# Patient Record
Sex: FEMALE | Race: WHITE | NOT HISPANIC OR LATINO | Employment: UNEMPLOYED | URBAN - METROPOLITAN AREA
[De-identification: names, ages, dates, MRNs, and addresses within clinical notes are randomized per-mention and may not be internally consistent; named-entity substitution may affect disease eponyms.]

---

## 2017-02-06 ENCOUNTER — PROCEDURE VISIT (OUTPATIENT)
Dept: OPHTHALMOLOGY | Facility: CLINIC | Age: 77
End: 2017-02-06
Payer: MEDICARE

## 2017-02-06 VITALS — HEART RATE: 67 BPM | DIASTOLIC BLOOD PRESSURE: 62 MMHG | SYSTOLIC BLOOD PRESSURE: 164 MMHG

## 2017-02-06 DIAGNOSIS — H35.3110 NONEXUDATIVE AGE-RELATED MACULAR DEGENERATION OF RIGHT EYE: ICD-10-CM

## 2017-02-06 DIAGNOSIS — H35.3211 EXUDATIVE AGE-RELATED MACULAR DEGENERATION OF RIGHT EYE WITH ACTIVE CHOROIDAL NEOVASCULARIZATION: Primary | ICD-10-CM

## 2017-02-06 DIAGNOSIS — H43.393 FLOATER, VITREOUS, BILATERAL: ICD-10-CM

## 2017-02-06 DIAGNOSIS — H35.3220 EXUDATIVE AGE-RELATED MACULAR DEGENERATION OF LEFT EYE: ICD-10-CM

## 2017-02-06 PROCEDURE — 92134 CPTRZ OPH DX IMG PST SGM RTA: CPT | Mod: PBBFAC,PO | Performed by: OPHTHALMOLOGY

## 2017-02-06 PROCEDURE — 92014 COMPRE OPH EXAM EST PT 1/>: CPT | Mod: 25,S$PBB,, | Performed by: OPHTHALMOLOGY

## 2017-02-06 PROCEDURE — 67028 INJECTION EYE DRUG: CPT | Mod: S$PBB,RT,, | Performed by: OPHTHALMOLOGY

## 2017-02-06 PROCEDURE — 67028 INJECTION EYE DRUG: CPT | Mod: PBBFAC,PO,RT | Performed by: OPHTHALMOLOGY

## 2017-02-06 RX ADMIN — AFLIBERCEPT 2 MG: 40 INJECTION, SOLUTION INTRAVITREAL at 11:02

## 2017-02-06 NOTE — PROGRESS NOTES
Pt Hard of healing    OCT - OD  PED's with SRF  OS - SR Fibrosis - NO SRF      A/P    1. Wet AMD OS  S/p Avastin injections outside  Stable today with cicatrix  Observe    2. Wet AMD OD  S/p Avastin OD x 7  S/p Eylea OD x 3    Eylea OD    Alternate tx      AREDS/AG    3. DM  No significant DR  BS/BP/chol control    4. PCIOL OU      6-7 weeks OCT      Risks, benefits, and alternatives to treatment discussed in detail with the patient.  The patient voiced understanding and wished to proceed with the procedure    Injection Procedure Note:  Diagnosis: Wet AMD OD    Topical Proparacaine and Betadine.  Inject Eylea OD at 6:00 @ 3.5-4mm posterior to limbus  Post Operative Dx: Same  Complications: None  Follow up as above.

## 2017-02-06 NOTE — PATIENT INSTRUCTIONS

## 2017-02-06 NOTE — MR AVS SNAPSHOT
[facility]  [address]  [facility] [address]  Phone: [phone]  Fax: [phone]                  Rebeca Turner   2017 9:40 AM   Procedure visit    Description:  Female : 1940   Provider:  SONU Sue MD   Department:  [facility]           Reason for Visit     Eye Problem           Diagnoses this Visit        Comments    Exudative age-related macular degeneration of right eye with active choroidal neovascularization    -  Primary     Nonexudative age-related macular degeneration of right eye         Floater, vitreous, bilateral         Exudative age-related macular degeneration of left eye                To Do List           Future Appointments        Provider Department Dept Phone    3/15/2017 10:40 AM Sae Snowden DPM [facility] Pod[facility]ry [phone]    2017 9:20 AM Edward Lema Jr., MD [facility] Neuorology [phone]    2017 10:00 AM LAB, [place] [facility] [phone]    2017 11:20 AM Andrea Ellis NP [facility] Hematology [phone]      Goals (5 Years of Data)              Today    16    Blood Pressure < 140/90   164/62  159/60  138/56    BMI is less than 25           HEMOGLOBIN A1C < 7.0             Follow-Up and Disposition     Return in about 6 weeks (around 3/20/2017).      [facility]     [facility] Care [facility] - 24/7 Assistance  Registered nurses in the [facility] provide clinical advisement, health education, appointment booking, and other advisory services.  Call for this free service at [phone].             Medications           These medications were administered today        Dose Freq    aflibercept Soln 2 mg 2 mg Clinic/HOD 1 time    Si.05 mLs (2 mg total) by Intravitreal route one time.    Class: Normal    Route: Intravitreal           Verify that the below list of medications is an accurate representation of the medications  you are currently taking.  If none reported, the list may be blank. If incorrect, please contact your healthcare provider. Carry this list with you in case of emergency.           Current Medications     acetaminophen (TYLENOL EXTRA STRENGTH) 500 MG tablet Take 500 mg by mouth every 6 (six) hours as needed for Pain or Temperature greater than.     alendronate (FOSAMAX) 70 MG tablet TAKE ONE TABLET WEEKLY AS DIRECTED ON THURSDAY    calcium carbonate-vitamin D3 (CALCIUM 600 + D,3,) 600 mg calcium- 200 unit Cap Take 1 capsule by mouth 2 (two) times daily.    CRESTOR 10 mg tablet TAKE ONE TABLET BY MOUTH EVERY NIGHT AT BEDTIME    diltiaZEM (CARDIZEM CD) 360 MG 24 hr capsule     dimethicone-kelvin-A-C-E-aloe (GOLD BOND ULTIMATE DIABETICS') 3-30 % Crea Apply topically 2 (two) times daily. APPLY TO FEET    docusate sodium (COLACE) 100 MG capsule Take 100 mg by mouth 2 (two) times daily.    hydrALAZINE (APRESOLINE) 50 MG tablet Take 50 mg by mouth every 12 (twelve) hours.    IRON,CARBONYL/ASCORBIC ACID (ICAR-C ORAL) Take 1 tablet by mouth once daily.    labetalol (NORMODYNE) 300 MG tablet TAKE ONE TABLET BY MOUTH TWICE DAILY FOR BLOOD PRESSURE    LANTUS SOLOSTAR 100 unit/mL (3 mL) InPn pen     lisinopril (PRINIVIL,ZESTRIL) 20 MG tablet     lutein 20 mg Cap TAKE ONE SOFTGEL BY MOUTH EVERY MORNING    polyethylene glycol (GLYCOLAX) 17 gram/dose powder     TEGRETOL  mg 12 hr tablet TAKE ONE TABLET BY MOUTH ONCE DAILY ***SEIZURES***    triamterene-hydrochlorothiazide 37.5-25 mg (MAXZIDE-25) 37.5-25 mg per tablet Take 1 tablet by mouth once daily.           Clinical Reference Information           Your Vitals Were     BP Pulse                164/62 (BP Location: Left arm, Patient Position: Sitting, BP Method: Automatic) 67          Blood Pressure          Most Recent Value    BP  (!)  164/62      Allergies as of 2/6/2017     No Known Allergies      Immunizations Administered on Date of Encounter - 2/6/2017     None       Orders Placed During Today's Visit      Normal Orders This Visit    Posterior Segment OCT Retina-Both eyes     Prior Authorization Order     Future Labs/Procedures Expected by Expires    Posterior Segment OCT Retina-Both eyes  As directed 2/6/2018      Language Assistance Services     ATTENTION: Language assistance services are available, free of charge. Please call [phone].      ATENCIÓN: Si habla adan, tiene a noel disposición servicios gratuitos de asistencia lingüística. Llame al [phone].     CHÚ Ý: N?u b?n nói Ti?ng Vi?t, có các d?ch v? h? tr? ngôn ng? mi?n phí dành cho b?n. G?i s? [phone].         [facility] complies with applicable Federal civil rights laws and does not discriminate on the basis of race, color, national origin, age, disability, or sex.

## 2017-03-15 ENCOUNTER — OFFICE VISIT (OUTPATIENT)
Dept: PODIATRY | Facility: CLINIC | Age: 77
End: 2017-03-15
Payer: MEDICARE

## 2017-03-15 VITALS — BODY MASS INDEX: 36.17 KG/M2 | WEIGHT: 204.13 LBS | HEIGHT: 63 IN

## 2017-03-15 DIAGNOSIS — L98.8 MACERATION OF SKIN: ICD-10-CM

## 2017-03-15 DIAGNOSIS — L84 CORN OR CALLUS: ICD-10-CM

## 2017-03-15 DIAGNOSIS — B35.1 ONYCHOMYCOSIS DUE TO DERMATOPHYTE: ICD-10-CM

## 2017-03-15 DIAGNOSIS — E11.49 TYPE II DIABETES MELLITUS WITH NEUROLOGICAL MANIFESTATIONS: Primary | ICD-10-CM

## 2017-03-15 PROCEDURE — 11057 PARNG/CUTG B9 HYPRKR LES >4: CPT | Mod: PBBFAC,PO | Performed by: PODIATRIST

## 2017-03-15 PROCEDURE — 11721 DEBRIDE NAIL 6 OR MORE: CPT | Mod: PBBFAC,PO | Performed by: PODIATRIST

## 2017-03-15 PROCEDURE — 99214 OFFICE O/P EST MOD 30 MIN: CPT | Mod: 25,S$PBB,, | Performed by: PODIATRIST

## 2017-03-15 PROCEDURE — 11057 PARNG/CUTG B9 HYPRKR LES >4: CPT | Mod: S$PBB,Q9,, | Performed by: PODIATRIST

## 2017-03-15 PROCEDURE — 99999 PR PBB SHADOW E&M-EST. PATIENT-LVL II: CPT | Mod: PBBFAC,,, | Performed by: PODIATRIST

## 2017-03-15 PROCEDURE — 11721 DEBRIDE NAIL 6 OR MORE: CPT | Mod: S$PBB,59,Q9, | Performed by: PODIATRIST

## 2017-03-15 PROCEDURE — 99212 OFFICE O/P EST SF 10 MIN: CPT | Mod: PBBFAC,PO | Performed by: PODIATRIST

## 2017-03-15 NOTE — PROGRESS NOTES
Review of Systems   Constitution: Negative for chills and fever.   Cardiovascular: Negative for claudication and leg swelling.   Skin: Positive for color change and nail changes.   Musculoskeletal: Negative for arthritis, joint pain and joint swelling.   Gastrointestinal: Negative for nausea and vomiting.   Neurological: Positive for paresthesias.   Psychiatric/Behavioral: Negative for altered mental status.       Physical Exam   Constitutional: She is oriented to person, place, and time. She appears well-developed and well-nourished. No distress.   Cardiovascular:   Pulses:       Dorsalis pedis pulses are 2+ on the right side, and 2+ on the left side.        Posterior tibial pulses are 1+ on the right side, and 1+ on the left side.   CFT <3 seconds bilateral.  Pedal hair growth decreased bilateral.  Varicosities noted to bilateral lower extremity.  Mild nonpitting edema noted to bilateral lower extremity.  Toes are cool to touch bilateral.     Musculoskeletal: She exhibits edema. She exhibits no tenderness.   Muscle strength 5/5 in all muscle groups bilateral.  No tenderness nor crepitation with ROM of foot/ankle joints bilateral.  No tenderness with palpation of bilateral foot and ankle.  Bilateral pes planus foot type.     Neurological: She is alert and oriented to person, place, and time. She has normal strength. A sensory deficit is present.   Protective sensation per Merritt-Julianne monofilament decreased bilateral.    Vibratory sensation decreased bilateral.    Light touch intact bilateral.   Skin: Skin is warm and dry. Lesion noted. No abrasion, no bruising, no burn, no ecchymosis, no laceration, no petechiae and no rash noted. She is not diaphoretic. No cyanosis or erythema. No pallor. Nails show no clubbing.   Pedal skin appears thin and atrophic bilateral.  Toenails x 10 appear thickened by 2 mm's, elongated by 4 mm's, and discolored with subungual debris.  Hyperkeratotic lesions noted to the Rt. 2nd  toe, bilateral sub 2nd metatarsal head, and bilateral medial hallux.  Multiple nevi noted to the dorsum of the Lt. Foot.  Interdigital maceration noted to all webspaces.  No break in the adjacent skin integrity.                      Subjective:      Patient ID: Rebeca Turner is a 76 y.o. female.    Chief Complaint: Callouses; Nail Care; and Diabetes Mellitus (Alexander 7/8/16 A1C 6.9 11/2/16)    Rebeca is a 76 y.o. female who presents to the clinic for routine evaluation and treatment of diabetic feet. Rebeca has a past medical history of Anemia of chronic disease; CKD (chronic kidney disease) stage 3, GFR 30-59 ml/min; DM type 2 (diabetes mellitus, type 2); DVT (deep venous thrombosis); Dysplastic nevi; Fatty liver; GERD (gastroesophageal reflux disease); H/O esophagogastroduodenoscopy; History of endometrial cancer; HTN (hypertension); Hyperlipidemia; LVE (left ventricular enlargement); Macular degeneration; MR (mental retardation); Obesity; LUISANA (obstructive sleep apnea); Osteoporosis; S/P colonoscopy; SBO (small bowel obstruction); Seizure disorder; and Vitreous detachment. Patient relates no major problem with feet. Only complaints today consist of toenails in need of trimming.  Denies being painful with wearing shoe gear.  Has not attempted to trim on her own.  Denies any additional pedal complaints.      PCP: Bárbara Munoz MD    Date Last Seen by PCP: 7/8/16    Current shoe gear: Casual shoes    Hemoglobin A1C   Date Value Ref Range Status   11/02/2016 6.9 (H) 4.5 - 6.2 % Final     Comment:     According to ADA guidelines, hemoglobin A1C <7.0% represents  optimal control in non-pregnant diabetic patients.  Different  metrics may apply to specific populations.   Standards of Medical Care in Diabetes - 2016.  For the purpose of screening for the presence of diabetes:  <5.7%     Consistent with the absence of diabetes  5.7-6.4%  Consistent with increasing risk for diabetes   (prediabetes)  >or=6.5%  Consistent with  diabetes  Currently no consensus exists for use of hemoglobin A1C  for diagnosis of diabetes for children.     08/23/2016 6.2 4.5 - 6.2 % Final     Comment:     According to ADA guidelines, hemoglobin A1C <7.0% represents  optimal control in non-pregnant diabetic patients.  Different  metrics may apply to specific populations.   Standards of Medical Care in Diabetes - 2016.  For the purpose of screening for the presence of diabetes:  <5.7%     Consistent with the absence of diabetes  5.7-6.4%  Consistent with increasing risk for diabetes   (prediabetes)  >or=6.5%  Consistent with diabetes  Currently no consensus exists for use of hemoglobin A1C  for diagnosis of diabetes for children.     05/31/2016 6.6 (H) 0.0 - 5.6 % Final     Comment:     Reference Interval:  5.0 - 5.6 Normal   5.7 - 6.4 High Risk   > 6.5 Diabetic    Hgb A1c results are standardized based on the (NGSP) National   Glycohemoglobin Standardization Program.    Hemoglobin A1C levels are related to mean serum/plasma glucose   during the preceding 2-3 months.                Past Medical History:   Diagnosis Date    Anemia of chronic disease     hematology su neg    CKD (chronic kidney disease) stage 3, GFR 30-59 ml/min     DM type 2 (diabetes mellitus, type 2)     DVT (deep venous thrombosis)     x 2 both post-op with last 6/16    Dysplastic nevi     h/o    Fatty liver     noted on usg 9/15    GERD (gastroesophageal reflux disease)     H/O esophagogastroduodenoscopy     normal 11/10    History of endometrial cancer     stage 1;  s/p SENDY with BSO 10/06    HTN (hypertension)     Hyperlipidemia     LVE (left ventricular enlargement)     noted on 6/15 ECHO    Macular degeneration     exudative    MR (mental retardation)     Obesity     LUISANA (obstructive sleep apnea)     su in progress    Osteoporosis     osteopenia noted on 2/13 and 7/15 dexa    S/P colonoscopy     last 10/15;  next 10/19    SBO (small bowel obstruction)     h/o recurrent  SBO;  s/p R hemicolectomy 12/10 and s/p incarcerated hernia repair with extensive scar tissue 6/16    Seizure disorder     Vitreous detachment        Past Surgical History:   Procedure Laterality Date    ABDOMINAL SURGERY  06/2016    Incarcerated incisional hernia, enterotomy x3, and extensive adhesions      CATARACT EXTRACTION      B 6/11    CHOLECYSTECTOMY      COLON SURGERY  12/6/2010  (Dinorah, Ms.)    R hemicolectomy 12/10 due to recurrent SBO from benign colon mass 12/2010.   Benign albeit large polyp.    COLONOSCOPY N/A 10/7/2015    Procedure: COLONOSCOPY;  Surgeon: Will Cr Jr., MD;  Location: [facility] ENDO;  Service: Endoscopy;  Laterality: N/A;    COLONOSCOPY W/ POLYPECTOMY  11/22/2010  Dinorah, Ms.    3 cm growth mid ascending colon.  TUBULOVILLOUS ADENOMA. 9 mm polyp mid transverse colon.  TUBULAR ADENOMA.  10 mm polyp distal descending colon.  TUBULOVILLOUS ADENOMA.     ESOPHAGOGASTRODUODENOSCOPY  11/16/2010    Normal EGD.    HERNIA REPAIR  8/2015    from incarcerated hernia    HYSTERECTOMY  10/17/2006    SENDY and BSO 10/06 for stage 1 endometrial cancer    MYRINGOTOMY W/ TUBES      bilateral    TONSILLECTOMY         Family History   Problem Relation Age of Onset    Cancer Sister      details unknown    Heart attack Father     Pulmonary embolism Mother        Social History     Social History    Marital status: Single     Spouse name: N/A    Number of children: N/A    Years of education: N/A     Social History Main Topics    Smoking status: Never Smoker    Smokeless tobacco: Never Used    Alcohol use No    Drug use: No    Sexual activity: No     Other Topics Concern    None     Social History Narrative    Lives at [facility].       Current Outpatient Prescriptions   Medication Sig Dispense Refill    acetaminophen (TYLENOL EXTRA STRENGTH) 500 MG tablet Take 500 mg by mouth every 6 (six) hours as needed for Pain or Temperature greater than.       alendronate (FOSAMAX) 70 MG tablet  TAKE ONE TABLET WEEKLY AS DIRECTED ON THURSDAY 4 tablet 0    calcium carbonate-vitamin D3 (CALCIUM 600 + D,3,) 600 mg calcium- 200 unit Cap Take 1 capsule by mouth 2 (two) times daily.      CRESTOR 10 mg tablet TAKE ONE TABLET BY MOUTH EVERY NIGHT AT BEDTIME 30 tablet 0    diltiaZEM (CARDIZEM CD) 360 MG 24 hr capsule       dimethicone-kelvin-A-C-E-aloe (GOLD BOND ULTIMATE DIABETICS') 3-30 % Crea Apply topically 2 (two) times daily. APPLY TO FEET      docusate sodium (COLACE) 100 MG capsule Take 100 mg by mouth 2 (two) times daily.      hydrALAZINE (APRESOLINE) 50 MG tablet Take 50 mg by mouth every 12 (twelve) hours.      IRON,CARBONYL/ASCORBIC ACID (ICAR-C ORAL) Take 1 tablet by mouth once daily.      labetalol (NORMODYNE) 300 MG tablet TAKE ONE TABLET BY MOUTH TWICE DAILY FOR BLOOD PRESSURE 60 tablet 0    LANTUS SOLOSTAR 100 unit/mL (3 mL) InPn pen       lisinopril (PRINIVIL,ZESTRIL) 20 MG tablet       lutein 20 mg Cap TAKE ONE SOFTGEL BY MOUTH EVERY MORNING 30 each 0    polyethylene glycol (GLYCOLAX) 17 gram/dose powder       rivaroxaban (XARELTO) 20 mg Tab Take 1 tablet (20 mg total) by mouth daily with dinner or evening meal. Continue for Minimum  Of Six months. 30 tablet 5    TEGRETOL  mg 12 hr tablet TAKE ONE TABLET BY MOUTH ONCE DAILY SEIZURES 30 tablet 0    triamterene-hydrochlorothiazide 37.5-25 mg (MAXZIDE-25) 37.5-25 mg per tablet Take 1 tablet by mouth once daily.       No current facility-administered medications for this visit.        Review of patient's allergies indicates:  No Known Allergies      Review of Systems   Constitution: Negative for chills and fever.   Cardiovascular: Negative for claudication and leg swelling.   Skin: Positive for color change and nail changes.   Musculoskeletal: Negative for arthritis, joint pain and joint swelling.   Gastrointestinal: Negative for nausea and vomiting.   Neurological: Positive for paresthesias.   Psychiatric/Behavioral: Negative for  altered mental status.           Objective:      Physical Exam   Constitutional: She is oriented to person, place, and time. She appears well-developed and well-nourished. No distress.   Cardiovascular:   Pulses:       Dorsalis pedis pulses are 2+ on the right side, and 2+ on the left side.        Posterior tibial pulses are 1+ on the right side, and 1+ on the left side.   CFT <3 seconds bilateral.  Pedal hair growth decreased bilateral.  Varicosities noted to bilateral lower extremity.  Mild nonpitting edema noted to bilateral lower extremity.  Toes are cool to touch bilateral.     Musculoskeletal: She exhibits edema. She exhibits no tenderness.   Muscle strength 5/5 in all muscle groups bilateral.  No tenderness nor crepitation with ROM of foot/ankle joints bilateral.  No tenderness with palpation of bilateral foot and ankle.  Bilateral pes planus foot type.     Neurological: She is alert and oriented to person, place, and time. She has normal strength. A sensory deficit is present.   Protective sensation per Whipple-Julianne monofilament decreased bilateral.    Vibratory sensation decreased bilateral.    Light touch intact bilateral.   Skin: Skin is warm and dry. Lesion noted. No abrasion, no bruising, no burn, no ecchymosis, no laceration, no petechiae and no rash noted. She is not diaphoretic. No cyanosis or erythema. No pallor. Nails show no clubbing.   Pedal skin appears thin and atrophic bilateral.  Toenails x 10 appear thickened by 2 mm's, elongated by 4 mm's, and discolored with subungual debris.  Hyperkeratotic lesions noted to the Rt. 2nd toe, bilateral sub 2nd metatarsal head, and bilateral medial hallux.  Multiple nevi noted to the dorsum of the Lt. Foot.               Assessment:       Encounter Diagnoses   Name Primary?    Type II diabetes mellitus with neurological manifestations Yes    Corn or callus     Onychomycosis due to dermatophyte     Maceration of skin          Plan:       Rebeca was seen  today for callouses, nail care and diabetes mellitus.    Diagnoses and all orders for this visit:    Type II diabetes mellitus with neurological manifestations    Corn or callus    Onychomycosis due to dermatophyte    Maceration of skin      I counseled the patient on her conditions, their implications and medical management.    Shoe inspection. Diabetic Foot Education. Patient reminded of the importance of good nutrition and blood sugar control to help prevent podiatric complications of diabetes. Patient instructed on proper foot hygeine. We discussed wearing proper shoe gear, daily foot inspections, never walking without protective shoe gear, never putting sharp instruments to feet    Advised to apply betadine to all interdigital spaces to resolve maceration.     With patient's permission, nails were aggressively reduced and debrided x 10 to their soft tissue attachment mechanically and with electric , removing all offending nail and debris.  Also, a sterile #15 scalpel was used to trim lesions x 5 down to healthy appearing skin without incident.  Patient relates relief following the procedure. She will continue to monitor the areas daily, inspect her feet, wear protective shoe gear when ambulatory, moisturizer to maintain skin integrity and follow in this office in approximately 2-3 months, sooner p.r.n.      Return in about 3 months (around 6/15/2017).    aSe [facility] DPM

## 2017-03-15 NOTE — MR AVS SNAPSHOT
[facility]  [address] [place] [address]  Phone: [phone]                  Rebeca Turner   3/15/2017 10:40 AM   Office Visit    Description:  Female : 1940   Provider:  Sae Snowden DPM   Department:  [facility]           Reason for Visit     Callouses     Nail Care     Diabetes Mellitus           Diagnoses this Visit        Comments    Type II diabetes mellitus with neurological manifestations    -  Primary     Corn or callus         Onychomycosis due to dermatophyte         Maceration of skin                To Do List           Future Appointments        Provider Department Dept Phone    3/20/2017 9:50 AM SONU Sue MD [place] [facility] [phone]    2017 9:20 AM Edward Lema Jr., MD [facility] Neurology [phone]    2017 10:00 AM LAB, [place] [facility] [phone]    2017 11:20 AM Andrea Ellis NP [facility] Hematology [phone]    6/15/2017 9:20 AM [facility] [phone]      Goals (5 Years of Data)              16    Blood Pressure < 140/90   164/62  164/62  164/62    BMI is less than 25           HEMOGLOBIN A1C < 7.0             Follow-Up and Disposition     Return in about 3 months (around 6/15/2017).      [facility]     [facility] Care [facility] - 24/7 Assistance  Registered nurses in the [facility] provide clinical advisement, health education, appointment booking, and other advisory services.  Call for this free service at [phone].             Medications                Verify that the below list of medications is an accurate representation of the medications you are currently taking.  If none reported, the list may be blank. If incorrect, please contact your healthcare provider. Carry this list with you in case of emergency.           Current Medications     acetaminophen (TYLENOL EXTRA STRENGTH)  "500 MG tablet Take 500 mg by mouth every 6 (six) hours as needed for Pain or Temperature greater than.     alendronate (FOSAMAX) 70 MG tablet TAKE ONE TABLET WEEKLY AS DIRECTED ON THURSDAY    calcium carbonate-vitamin D3 (CALCIUM 600 + D,3,) 600 mg calcium- 200 unit Cap Take 1 capsule by mouth 2 (two) times daily.    CRESTOR 10 mg tablet TAKE ONE TABLET BY MOUTH EVERY NIGHT AT BEDTIME    diltiaZEM (CARDIZEM CD) 360 MG 24 hr capsule     dimethicone-kelvin-A-C-E-aloe (GOLD BOND ULTIMATE DIABETICS') 3-30 % Crea Apply topically 2 (two) times daily. APPLY TO FEET    docusate sodium (COLACE) 100 MG capsule Take 100 mg by mouth 2 (two) times daily.    hydrALAZINE (APRESOLINE) 50 MG tablet Take 50 mg by mouth every 12 (twelve) hours.    IRON,CARBONYL/ASCORBIC ACID (ICAR-C ORAL) Take 1 tablet by mouth once daily.    labetalol (NORMODYNE) 300 MG tablet TAKE ONE TABLET BY MOUTH TWICE DAILY FOR BLOOD PRESSURE    LANTUS SOLOSTAR 100 unit/mL (3 mL) InPn pen     lisinopril (PRINIVIL,ZESTRIL) 20 MG tablet     lutein 20 mg Cap TAKE ONE SOFTGEL BY MOUTH EVERY MORNING    polyethylene glycol (GLYCOLAX) 17 gram/dose powder     TEGRETOL  mg 12 hr tablet TAKE ONE TABLET BY MOUTH ONCE DAILY ***SEIZURES***    triamterene-hydrochlorothiazide 37.5-25 mg (MAXZIDE-25) 37.5-25 mg per tablet Take 1 tablet by mouth once daily.           Clinical Reference Information           Your Vitals Were     Height Weight BMI          5' 3" (1.6 m) 92.6 kg (204 lb 2.3 oz) 36.16 kg/m2        Allergies as of 3/15/2017     No Known Allergies      Immunizations Administered on Date of Encounter - 3/15/2017     None      Language Assistance Services     ATTENTION: Language assistance services are available, free of charge. Please call [phone].      ATENCIÓN: Si eduarda arellano, tiene a noel disposición servicios gratuitos de asistencia lingüística. Llame al [phone].     CHÚ Ý: N?u b?n nói Ti?ng Vi?t, có các d?ch v? h? tr? ngôn ng? mi?n navneet uribe " b?n. G?i s? [phone].         [facility] complies with applicable Federal civil rights laws and does not discriminate on the basis of race, color, national origin, age, disability, or sex.

## 2017-03-16 ENCOUNTER — TELEPHONE (OUTPATIENT)
Dept: PODIATRY | Facility: CLINIC | Age: 77
End: 2017-03-16

## 2017-03-20 ENCOUNTER — PROCEDURE VISIT (OUTPATIENT)
Dept: OPHTHALMOLOGY | Facility: CLINIC | Age: 77
End: 2017-03-20
Payer: MEDICARE

## 2017-03-20 VITALS — SYSTOLIC BLOOD PRESSURE: 166 MMHG | DIASTOLIC BLOOD PRESSURE: 63 MMHG | HEART RATE: 66 BPM

## 2017-03-20 DIAGNOSIS — H35.3110 NONEXUDATIVE AGE-RELATED MACULAR DEGENERATION OF RIGHT EYE: ICD-10-CM

## 2017-03-20 DIAGNOSIS — H35.3211 EXUDATIVE AGE-RELATED MACULAR DEGENERATION OF RIGHT EYE WITH ACTIVE CHOROIDAL NEOVASCULARIZATION: Primary | ICD-10-CM

## 2017-03-20 PROCEDURE — C9257 BEVACIZUMAB INJECTION: HCPCS | Mod: PBBFAC,PO | Performed by: OPHTHALMOLOGY

## 2017-03-20 PROCEDURE — 92014 COMPRE OPH EXAM EST PT 1/>: CPT | Mod: 25,S$PBB,, | Performed by: OPHTHALMOLOGY

## 2017-03-20 PROCEDURE — 67028 INJECTION EYE DRUG: CPT | Mod: S$PBB,RT,, | Performed by: OPHTHALMOLOGY

## 2017-03-20 PROCEDURE — 67028 INJECTION EYE DRUG: CPT | Mod: PBBFAC,PO,RT | Performed by: OPHTHALMOLOGY

## 2017-03-20 PROCEDURE — 92134 CPTRZ OPH DX IMG PST SGM RTA: CPT | Mod: PBBFAC,PO | Performed by: OPHTHALMOLOGY

## 2017-03-20 RX ADMIN — BEVACIZUMAB 1.25 MG: 100 INJECTION, SOLUTION INTRAVENOUS at 10:03

## 2017-03-20 NOTE — PATIENT INSTRUCTIONS

## 2017-03-20 NOTE — MR AVS SNAPSHOT
[place] [facility]  [address]  [facility] [address]  Phone: [phone]  Fax: [phone]                  Rebeca Turner   3/20/2017 9:50 AM   Procedure visit    Description:  Female : 1940   Provider:  SONU Sue MD   Department:  [place] [facility]           Reason for Visit     Eye Problem           Diagnoses this Visit        Comments    Exudative age-related macular degeneration of right eye with active choroidal neovascularization    -  Primary     Nonexudative age-related macular degeneration of right eye                To Do List           Future Appointments        Provider Department Dept Phone    2017 9:20 AM Edwadr Lema Jr., MD [facility] Neurology [phone]    2017 10:00 AM LAB, [place] [facility] [phone]    2017 11:20 AM Andrea Ellis NP [facility] Hematology [phone]    6/15/2017 9:20 AM Sae Snowden DPM [facility] Podiatry [phone]      Goals (5 Years of Data)              Today    17    Blood Pressure < 140/90   166/63  166/63  166/63    BMI is less than 25           HEMOGLOBIN A1C < 7.0             Follow-Up and Disposition     Return in about 6 weeks (around 2017).      [facility]     [facility] Nurse Care [facility] - 24/7 Assistance  Registered nurses in the [facility] provide clinical advisement, health education, appointment booking, and other advisory services.  Call for this free service at [phone].             Medications           These medications were administered today        Dose Freq    bevacizumab (AVASTIN) 2.5 mg/0.10 mL 1.25 mg 1.25 mg Clinic/HOD 1 time    Sig: Inject 0.05 mLs (1.25 mg total) into the eye one time.    Class: Normal    Route: Intraocular           Verify that the below list of medications is an accurate representation of the medications you are currently taking.  If none reported, the list may be blank. If  incorrect, please contact your healthcare provider. Carry this list with you in case of emergency.           Current Medications     acetaminophen (TYLENOL EXTRA STRENGTH) 500 MG tablet Take 500 mg by mouth every 6 (six) hours as needed for Pain or Temperature greater than.     alendronate (FOSAMAX) 70 MG tablet TAKE ONE TABLET WEEKLY AS DIRECTED ON THURSDAY    calcium carbonate-vitamin D3 (CALCIUM 600 + D,3,) 600 mg calcium- 200 unit Cap Take 1 capsule by mouth 2 (two) times daily.    CRESTOR 10 mg tablet TAKE ONE TABLET BY MOUTH EVERY NIGHT AT BEDTIME    diltiaZEM (CARDIZEM CD) 360 MG 24 hr capsule     dimethicone-kelvin-A-C-E-aloe (GOLD BOND ULTIMATE DIABETICS') 3-30 % Crea Apply topically 2 (two) times daily. APPLY TO FEET    docusate sodium (COLACE) 100 MG capsule Take 100 mg by mouth 2 (two) times daily.    hydrALAZINE (APRESOLINE) 50 MG tablet Take 50 mg by mouth every 12 (twelve) hours.    IRON,CARBONYL/ASCORBIC ACID (ICAR-C ORAL) Take 1 tablet by mouth once daily.    labetalol (NORMODYNE) 300 MG tablet TAKE ONE TABLET BY MOUTH TWICE DAILY FOR BLOOD PRESSURE    LANTUS SOLOSTAR 100 unit/mL (3 mL) InPn pen     lisinopril (PRINIVIL,ZESTRIL) 20 MG tablet     lutein 20 mg Cap TAKE ONE SOFTGEL BY MOUTH EVERY MORNING    polyethylene glycol (GLYCOLAX) 17 gram/dose powder     TEGRETOL  mg 12 hr tablet TAKE ONE TABLET BY MOUTH ONCE DAILY ***SEIZURES***    triamterene-hydrochlorothiazide 37.5-25 mg (MAXZIDE-25) 37.5-25 mg per tablet Take 1 tablet by mouth once daily.           Clinical Reference Information           Your Vitals Were     BP Pulse                166/63 (BP Location: Left arm, Patient Position: Sitting, BP Method: Automatic) 66          Blood Pressure          Most Recent Value    BP  (!)  166/63      Allergies as of 3/20/2017     No Known Allergies      Immunizations Administered on Date of Encounter - 3/20/2017     None      Orders Placed During Today's Visit      Normal Orders This Visit     Posterior Segment OCT Retina-Both eyes     Prior Authorization Order     Future Labs/Procedures Expected by Expires    Posterior Segment OCT Retina-Both eyes  As directed 3/20/2018      Language Assistance Services     ATTENTION: Language assistance services are available, free of charge. Please call [phone].      ATENCIÓN: Si eduarda arellano, tiene a noel disposición servicios gratuitos de asistencia lingüística. Llame al [phone].     CHÚ Ý: N?u b?n nói Ti?ng Vi?t, có các d?ch v? h? tr? ngôn ng? mi?n phí dành cho b?n. G?i s? [phone].         [place] [facility] complies with applicable Federal civil rights laws and does not discriminate on the basis of race, color, national origin, age, disability, or sex.

## 2017-03-20 NOTE — PROGRESS NOTES
Pt Hard of healing    OCT - OD  PED's with SRF  OS - SR Fibrosis - NO SRF      A/P    1. Wet AMD OS  S/p Avastin injections outside  Stable today with cicatrix  Observe    2. Wet AMD OD  S/p Avastin OD x 8  S/p Eylea OD x 4    Avastin OD    Alternate tx      AREDS/AG    3. DM  No significant DR  BS/BP/chol control    4. PCIOL OU      6-7 weeks OCT      Risks, benefits, and alternatives to treatment discussed in detail with the patient.  The patient voiced understanding and wished to proceed with the procedure    Injection Procedure Note:  Diagnosis: Wet AMD OD    Topical Proparacaine and Betadine.  Inject Avastin OD at 6:00 @ 3.5-4mm posterior to limbus  Post Operative Dx: Same  Complications: None  Follow up as above.

## 2017-04-26 ENCOUNTER — OFFICE VISIT (OUTPATIENT)
Dept: NEUROLOGY | Facility: CLINIC | Age: 77
End: 2017-04-26
Payer: MEDICARE

## 2017-04-26 VITALS
RESPIRATION RATE: 20 BRPM | DIASTOLIC BLOOD PRESSURE: 46 MMHG | HEIGHT: 63 IN | WEIGHT: 199.06 LBS | HEART RATE: 69 BPM | SYSTOLIC BLOOD PRESSURE: 144 MMHG | BODY MASS INDEX: 35.27 KG/M2

## 2017-04-26 DIAGNOSIS — M89.9 BONE DISORDER: ICD-10-CM

## 2017-04-26 DIAGNOSIS — G40.909 SEIZURE DISORDER: Primary | ICD-10-CM

## 2017-04-26 DIAGNOSIS — M85.80 OSTEOPENIA, UNSPECIFIED LOCATION: ICD-10-CM

## 2017-04-26 DIAGNOSIS — Z79.899 HIGH RISK MEDICATION USE: ICD-10-CM

## 2017-04-26 PROCEDURE — 99999 PR PBB SHADOW E&M-EST. PATIENT-LVL III: CPT | Mod: PBBFAC,,, | Performed by: PSYCHIATRY & NEUROLOGY

## 2017-04-26 PROCEDURE — 99214 OFFICE O/P EST MOD 30 MIN: CPT | Mod: S$PBB,,, | Performed by: PSYCHIATRY & NEUROLOGY

## 2017-04-26 PROCEDURE — 99213 OFFICE O/P EST LOW 20 MIN: CPT | Mod: PBBFAC,PN | Performed by: PSYCHIATRY & NEUROLOGY

## 2017-04-26 NOTE — MR AVS SNAPSHOT
[facility]  [address] [place] [address]  Phone: [phone]  Fax: [phone]                  Rebeca Turner   2017 9:20 AM   Office Visit    Description:  Female : 1940   Provider:  Edward Lema Jr., MD   Department:  [place] [facility]           Reason for Visit     Seizures           Diagnoses this Visit        Comments    Seizure disorder    -  Primary     High risk medication use         Osteopenia, unspecified location         Bone disorder                To Do List           Future Appointments        Provider Department Dept Phone    2017 10:20 AM SONU Sue MD [facility] Ophthalmology [phone]    5/3/2017 8:45 AM LAB, [place] [facility] [phone]    2017 11:20 AM Andrea Ellis NP [facility] Hematology [phone]    6/15/2017 9:20 AM Sae Snowden DPM [facility] Podiatry [phone]    2017 9:00 AM [facility] DEXA1 [facility] [phone]      Goals (5 Years of Data)              Today    3/20/17    2/6/17    Blood Pressure < 140/90   144/46  144/46  144/46    BMI is less than 25           HEMOGLOBIN A1C < 7.0             [facility]     [facility] Care [facility] - 24/ Assistance  Unless otherwise directed by your provider, please contact [facility], our nurse care line that is available for  assistance.     Registered nurses in the [facility] provide: appointment scheduling, clinical advisement, health education, and other advisory services.  Call: [phone] (toll free)               Medications                Verify that the below list of medications is an accurate representation of the medications you are currently taking.  If none reported, the list may be blank. If incorrect, please contact your healthcare provider. Carry this list with you in case of emergency.           Current Medications     acetaminophen (TYLENOL EXTRA STRENGTH) 500 MG  "tablet Take 500 mg by mouth every 6 (six) hours as needed for Pain or Temperature greater than.     alendronate (FOSAMAX) 70 MG tablet TAKE ONE TABLET WEEKLY AS DIRECTED ON THURSDAY    calcium carbonate-vitamin D3 (CALCIUM 600 + D,3,) 600 mg calcium- 200 unit Cap Take 1 capsule by mouth 2 (two) times daily.    CRESTOR 10 mg tablet TAKE ONE TABLET BY MOUTH EVERY NIGHT AT BEDTIME    diltiaZEM (CARDIZEM CD) 360 MG 24 hr capsule     dimethicone-kelvin-A-C-E-aloe (GOLD BOND ULTIMATE DIABETICS') 3-30 % Crea Apply topically 2 (two) times daily. APPLY TO FEET    docusate sodium (COLACE) 100 MG capsule Take 100 mg by mouth 2 (two) times daily.    hydrALAZINE (APRESOLINE) 50 MG tablet Take 50 mg by mouth every 12 (twelve) hours.    IRON,CARBONYL/ASCORBIC ACID (ICAR-C ORAL) Take 1 tablet by mouth once daily.    labetalol (NORMODYNE) 300 MG tablet TAKE ONE TABLET BY MOUTH TWICE DAILY FOR BLOOD PRESSURE    LANTUS SOLOSTAR 100 unit/mL (3 mL) InPn pen Inject 35 Units into the skin every evening.     lisinopril (PRINIVIL,ZESTRIL) 20 MG tablet     lutein 20 mg Cap TAKE ONE SOFTGEL BY MOUTH EVERY MORNING    polyethylene glycol (GLYCOLAX) 17 gram/dose powder     TEGRETOL  mg 12 hr tablet TAKE ONE TABLET BY MOUTH ONCE DAILY ***SEIZURES***    triamterene-hydrochlorothiazide 37.5-25 mg (MAXZIDE-25) 37.5-25 mg per tablet Take 1 tablet by mouth once daily.           Clinical Reference Information           Your Vitals Were     BP Pulse Resp Height Weight BMI    144/46 69 20 5' 3" (1.6 m) 90.3 kg (199 lb 1.2 oz) 35.26 kg/m2      Blood Pressure          Most Recent Value    BP  (!)  144/46      Allergies as of 4/26/2017     No Known Allergies      Immunizations Administered on Date of Encounter - 4/26/2017     None      Orders Placed During Today's Visit     Future Labs/Procedures Expected by Expires    Carbamazepine level, total  4/26/2017 6/25/2018    CBC auto differential  4/26/2017 6/25/2018    Comprehensive metabolic panel  " 4/26/2017 6/25/2018    DXA Bone Density Spine And Hip  4/26/2017 4/26/2018      Language Assistance Services     ATTENTION: Language assistance services are available, free of charge. Please call [phone].      ATENCIÓN: Si habla adan, tiene a noel disposición servicios gratuitos de asistencia lingüística. Llame al [phone].     CHÚ Ý: N?u b?n nói Ti?ng Vi?t, có các d?ch v? h? tr? ngôn ng? mi?n phí dành cho b?n. G?i s? [phone].         [facility] Neurology complies with applicable Federal civil rights laws and does not discriminate on the basis of race, color, national origin, age, disability, or sex.
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
## 2017-05-01 ENCOUNTER — PROCEDURE VISIT (OUTPATIENT)
Dept: OPHTHALMOLOGY | Facility: CLINIC | Age: 77
End: 2017-05-01
Payer: MEDICARE

## 2017-05-01 VITALS — DIASTOLIC BLOOD PRESSURE: 63 MMHG | SYSTOLIC BLOOD PRESSURE: 175 MMHG | HEART RATE: 67 BPM

## 2017-05-01 DIAGNOSIS — H35.3211 EXUDATIVE AGE-RELATED MACULAR DEGENERATION OF RIGHT EYE WITH ACTIVE CHOROIDAL NEOVASCULARIZATION: Primary | ICD-10-CM

## 2017-05-01 DIAGNOSIS — H35.3110 NONEXUDATIVE AGE-RELATED MACULAR DEGENERATION OF RIGHT EYE: ICD-10-CM

## 2017-05-01 PROCEDURE — 67028 INJECTION EYE DRUG: CPT | Mod: S$PBB,RT,, | Performed by: OPHTHALMOLOGY

## 2017-05-01 PROCEDURE — 92014 COMPRE OPH EXAM EST PT 1/>: CPT | Mod: 25,S$PBB,, | Performed by: OPHTHALMOLOGY

## 2017-05-01 PROCEDURE — 67028 INJECTION EYE DRUG: CPT | Mod: PBBFAC,PO,RT | Performed by: OPHTHALMOLOGY

## 2017-05-01 PROCEDURE — 92134 CPTRZ OPH DX IMG PST SGM RTA: CPT | Mod: PBBFAC,PO | Performed by: OPHTHALMOLOGY

## 2017-05-01 RX ADMIN — AFLIBERCEPT 2 MG: 40 INJECTION, SOLUTION INTRAVITREAL at 11:05

## 2017-05-01 NOTE — PATIENT INSTRUCTIONS

## 2017-05-01 NOTE — MR AVS SNAPSHOT
[facility]  [address]  [facility] [address]  Phone: [phone]  Fax: [phone]                  Rebeca Turner   2017 10:20 AM   Procedure visit    Description:  Female : 1940   Provider:  SONU Sue MD   Department:  [facility]           Reason for Visit     Eye Problem           Diagnoses this Visit        Comments    Exudative age-related macular degeneration of right eye with active choroidal neovascularization    -  Primary     Nonexudative age-related macular degeneration of right eye                To Do List           Future Appointments        Provider Department Dept Phone    5/3/2017 8:45 AM LAB, [place] [facility] [phone]    2017 11:20 AM Andrea Ellis NP [facility] Hematology [phone]    6/15/2017 9:20 AM Sae Snowden DPM [facility] Podiatry [phone]    2017 9:00 AM [place] DEXA1 [facility] [phone]      Goals (5 Years of Data)              Today    4/26/17    3/20/17    Blood Pressure < 140/90   175/63  175/63  175/63    BMI is less than 25           HEMOGLOBIN A1C < 7.0             Follow-Up and Disposition     Return in about 7 weeks (around 2017).      [facility]     [facility] Nurse Care [facility] - 24/7 Assistance  Unless otherwise directed by your provider, please contact [facility], our nurse care line that is available for 24/7 assistance.     Registered nurses in the [facility] provide: appointment scheduling, clinical advisement, health education, and other advisory services.  Call: [phone] (toll free)               Medications           These medications were administered today        Dose Freq    aflibercept Soln 2 mg 2 mg Clinic/HOD 1 time    Si.05 mLs (2 mg total) by Intravitreal route one time.    Class: Normal    Route: Intravitreal           Verify that the below list of medications is an accurate  representation of the medications you are currently taking.  If none reported, the list may be blank. If incorrect, please contact your healthcare provider. Carry this list with you in case of emergency.           Current Medications     acetaminophen (TYLENOL EXTRA STRENGTH) 500 MG tablet Take 500 mg by mouth every 6 (six) hours as needed for Pain or Temperature greater than.     alendronate (FOSAMAX) 70 MG tablet TAKE ONE TABLET WEEKLY AS DIRECTED ON THURSDAY    calcium carbonate-vitamin D3 (CALCIUM 600 + D,3,) 600 mg calcium- 200 unit Cap Take 1 capsule by mouth 2 (two) times daily.    CRESTOR 10 mg tablet TAKE ONE TABLET BY MOUTH EVERY NIGHT AT BEDTIME    diltiaZEM (CARDIZEM CD) 360 MG 24 hr capsule     dimethicone-kelvin-A-C-E-aloe (GOLD BOND ULTIMATE DIABETICS') 3-30 % Crea Apply topically 2 (two) times daily. APPLY TO FEET    docusate sodium (COLACE) 100 MG capsule Take 100 mg by mouth 2 (two) times daily.    hydrALAZINE (APRESOLINE) 50 MG tablet Take 50 mg by mouth every 12 (twelve) hours.    IRON,CARBONYL/ASCORBIC ACID (ICAR-C ORAL) Take 1 tablet by mouth once daily.    labetalol (NORMODYNE) 300 MG tablet TAKE ONE TABLET BY MOUTH TWICE DAILY FOR BLOOD PRESSURE    LANTUS SOLOSTAR 100 unit/mL (3 mL) InPn pen Inject 35 Units into the skin every evening.     lisinopril (PRINIVIL,ZESTRIL) 20 MG tablet     lutein 20 mg Cap TAKE ONE SOFTGEL BY MOUTH EVERY MORNING    polyethylene glycol (GLYCOLAX) 17 gram/dose powder     TEGRETOL  mg 12 hr tablet TAKE ONE TABLET BY MOUTH ONCE DAILY ***SEIZURES***    triamterene-hydrochlorothiazide 37.5-25 mg (MAXZIDE-25) 37.5-25 mg per tablet Take 1 tablet by mouth once daily.           Clinical Reference Information           Your Vitals Were     BP Pulse                175/63 (BP Location: Right arm, Patient Position: Sitting, BP Method: Automatic) 67          Blood Pressure          Most Recent Value    BP  (!)  175/63      Allergies as of 5/1/2017     No Known Allergies       Immunizations Administered on Date of Encounter - 5/1/2017     None      Orders Placed During Today's Visit      Normal Orders This Visit    Posterior Segment OCT Retina-Both eyes     Prior Authorization Order     Future Labs/Procedures Expected by Expires    Posterior Segment OCT Retina-Both eyes  As directed 5/1/2018      Language Assistance Services     ATTENTION: Language assistance services are available, free of charge. Please call [phone].      ATENCIÓN: Si habla español, tiene a noel disposición servicios gratuitos de asistencia lingüística. Llame al [phone].     CHÚ Ý: N?u b?n nói Ti?ng Vi?t, có các d?ch v? h? tr? ngôn ng? mi?n phí dành cho b?n. G?i s? [phone].         [facility] complies with applicable Federal civil rights laws and does not discriminate on the basis of race, color, national origin, age, disability, or sex.

## 2017-05-01 NOTE — PROGRESS NOTES
Pt Hard of healing    OCT - OD  PED's with SRF  OS - SR Fibrosis - NO SRF      A/P    1. Wet AMD OS  S/p Avastin injections outside  Stable today with cicatrix  Observe    2. Wet AMD OD  S/p Avastin OD x 8  S/p Eylea OD x 4    Eylea OD    Alternate tx      AREDS/AG    3. DM  No significant DR  BS/BP/chol control    4. PCIOL OU      7-8 weeks OCT      Risks, benefits, and alternatives to treatment discussed in detail with the patient.  The patient voiced understanding and wished to proceed with the procedure    Injection Procedure Note:  Diagnosis: Wet AMD OD    Topical Proparacaine and Betadine.  Inject Eylea OD at 6:00 @ 3.5-4mm posterior to limbus  Post Operative Dx: Same  Complications: None  Follow up as above.

## 2017-05-03 ENCOUNTER — LAB VISIT (OUTPATIENT)
Dept: LAB | Facility: HOSPITAL | Age: 77
End: 2017-05-03
Attending: RADIOLOGY
Payer: MEDICARE

## 2017-05-03 DIAGNOSIS — D50.9 IRON DEFICIENCY ANEMIA, UNSPECIFIED IRON DEFICIENCY ANEMIA TYPE: ICD-10-CM

## 2017-05-03 DIAGNOSIS — G40.909 SEIZURE DISORDER: ICD-10-CM

## 2017-05-03 LAB
ALBUMIN SERPL BCP-MCNC: 3.8 G/DL
ALP SERPL-CCNC: 132 U/L
ALT SERPL W/O P-5'-P-CCNC: 21 U/L
ANION GAP SERPL CALC-SCNC: 9 MMOL/L
ANION GAP SERPL CALC-SCNC: 9 MMOL/L
AST SERPL-CCNC: 15 U/L
BASOPHILS # BLD AUTO: 0.03 K/UL
BASOPHILS # BLD AUTO: 0.03 K/UL
BASOPHILS NFR BLD: 0.5 %
BASOPHILS NFR BLD: 0.5 %
BILIRUB SERPL-MCNC: 0.2 MG/DL
BUN SERPL-MCNC: 38 MG/DL
BUN SERPL-MCNC: 38 MG/DL
CALCIUM SERPL-MCNC: 10.1 MG/DL
CALCIUM SERPL-MCNC: 10.1 MG/DL
CARBAMAZEPINE SERPL-MCNC: 5.9 UG/ML
CHLORIDE SERPL-SCNC: 103 MMOL/L
CHLORIDE SERPL-SCNC: 103 MMOL/L
CO2 SERPL-SCNC: 28 MMOL/L
CO2 SERPL-SCNC: 28 MMOL/L
CREAT SERPL-MCNC: 1.3 MG/DL
CREAT SERPL-MCNC: 1.3 MG/DL
DIFFERENTIAL METHOD: ABNORMAL
DIFFERENTIAL METHOD: ABNORMAL
EOSINOPHIL # BLD AUTO: 0.1 K/UL
EOSINOPHIL # BLD AUTO: 0.1 K/UL
EOSINOPHIL NFR BLD: 1.5 %
EOSINOPHIL NFR BLD: 1.5 %
ERYTHROCYTE [DISTWIDTH] IN BLOOD BY AUTOMATED COUNT: 13.8 %
ERYTHROCYTE [DISTWIDTH] IN BLOOD BY AUTOMATED COUNT: 13.8 %
EST. GFR  (AFRICAN AMERICAN): 46 ML/MIN/1.73 M^2
EST. GFR  (AFRICAN AMERICAN): 46 ML/MIN/1.73 M^2
EST. GFR  (NON AFRICAN AMERICAN): 40 ML/MIN/1.73 M^2
EST. GFR  (NON AFRICAN AMERICAN): 40 ML/MIN/1.73 M^2
FERRITIN SERPL-MCNC: 104 NG/ML
GLUCOSE SERPL-MCNC: 182 MG/DL
GLUCOSE SERPL-MCNC: 182 MG/DL
HCT VFR BLD AUTO: 32.9 %
HCT VFR BLD AUTO: 32.9 %
HGB BLD-MCNC: 10.9 G/DL
HGB BLD-MCNC: 10.9 G/DL
IRON SERPL-MCNC: 79 UG/DL
LYMPHOCYTES # BLD AUTO: 0.9 K/UL
LYMPHOCYTES # BLD AUTO: 0.9 K/UL
LYMPHOCYTES NFR BLD: 13.8 %
LYMPHOCYTES NFR BLD: 13.8 %
MCH RBC QN AUTO: 30.4 PG
MCH RBC QN AUTO: 30.4 PG
MCHC RBC AUTO-ENTMCNC: 33.1 %
MCHC RBC AUTO-ENTMCNC: 33.1 %
MCV RBC AUTO: 92 FL
MCV RBC AUTO: 92 FL
MONOCYTES # BLD AUTO: 0.5 K/UL
MONOCYTES # BLD AUTO: 0.5 K/UL
MONOCYTES NFR BLD: 8.6 %
MONOCYTES NFR BLD: 8.6 %
NEUTROPHILS # BLD AUTO: 4.7 K/UL
NEUTROPHILS # BLD AUTO: 4.7 K/UL
NEUTROPHILS NFR BLD: 75.6 %
NEUTROPHILS NFR BLD: 75.6 %
PLATELET # BLD AUTO: 241 K/UL
PLATELET # BLD AUTO: 241 K/UL
PMV BLD AUTO: 9.7 FL
PMV BLD AUTO: 9.7 FL
POTASSIUM SERPL-SCNC: 5.5 MMOL/L
POTASSIUM SERPL-SCNC: 5.5 MMOL/L
PROT SERPL-MCNC: 7.4 G/DL
RBC # BLD AUTO: 3.58 M/UL
RBC # BLD AUTO: 3.58 M/UL
SATURATED IRON: 19 %
SODIUM SERPL-SCNC: 140 MMOL/L
SODIUM SERPL-SCNC: 140 MMOL/L
TOTAL IRON BINDING CAPACITY: 408 UG/DL
TRANSFERRIN SERPL-MCNC: 276 MG/DL
WBC # BLD AUTO: 6.15 K/UL
WBC # BLD AUTO: 6.15 K/UL

## 2017-05-03 PROCEDURE — 82728 ASSAY OF FERRITIN: CPT

## 2017-05-03 PROCEDURE — 80053 COMPREHEN METABOLIC PANEL: CPT | Mod: PO

## 2017-05-03 PROCEDURE — 36415 COLL VENOUS BLD VENIPUNCTURE: CPT | Mod: PO

## 2017-05-03 PROCEDURE — 80156 ASSAY CARBAMAZEPINE TOTAL: CPT

## 2017-05-03 PROCEDURE — 85025 COMPLETE CBC W/AUTO DIFF WBC: CPT | Mod: PO

## 2017-05-03 PROCEDURE — 83540 ASSAY OF IRON: CPT

## 2017-05-03 PROCEDURE — 84466 ASSAY OF TRANSFERRIN: CPT

## 2017-05-05 LAB — STFR SERPL-MCNC: 3.4 MG/L

## 2017-05-11 ENCOUNTER — OFFICE VISIT (OUTPATIENT)
Dept: HEMATOLOGY/ONCOLOGY | Facility: CLINIC | Age: 77
End: 2017-05-11
Payer: MEDICARE

## 2017-05-11 VITALS
HEART RATE: 63 BPM | TEMPERATURE: 98 F | WEIGHT: 197.31 LBS | SYSTOLIC BLOOD PRESSURE: 134 MMHG | RESPIRATION RATE: 20 BRPM | DIASTOLIC BLOOD PRESSURE: 45 MMHG | BODY MASS INDEX: 34.96 KG/M2 | HEIGHT: 63 IN

## 2017-05-11 DIAGNOSIS — D50.9 IRON DEFICIENCY ANEMIA, UNSPECIFIED IRON DEFICIENCY ANEMIA TYPE: Primary | ICD-10-CM

## 2017-05-11 DIAGNOSIS — N18.30 CKD (CHRONIC KIDNEY DISEASE) STAGE 3, GFR 30-59 ML/MIN: ICD-10-CM

## 2017-05-11 PROCEDURE — 99213 OFFICE O/P EST LOW 20 MIN: CPT | Mod: S$PBB,,, | Performed by: NURSE PRACTITIONER

## 2017-05-11 PROCEDURE — 99214 OFFICE O/P EST MOD 30 MIN: CPT | Mod: PBBFAC,PN | Performed by: NURSE PRACTITIONER

## 2017-05-11 PROCEDURE — 99999 PR PBB SHADOW E&M-EST. PATIENT-LVL IV: CPT | Mod: PBBFAC,,, | Performed by: NURSE PRACTITIONER

## 2017-05-11 RX ORDER — AMOXICILLIN 500 MG
2 CAPSULE ORAL DAILY
COMMUNITY

## 2017-05-11 NOTE — MR AVS SNAPSHOT
[facility] Hematology  [address]  [facility] [address]  Phone: [phone]  Fax: [phone]                  Rebeca Turner   2017 11:20 AM   Office Visit    Description:  Female : 1940   Provider:  Andrea Ellis NP   Department:  [facility]                To Do List           Future Appointments        Provider Department Dept Phone    6/15/2017 9:20 AM Sae Snowden DPM [facility] Podiatry [phone]    2017 9:50 AM SONU Sue MD [facility] Ophthalmology [phone]    2017 9:00 AM NS DEXA1 [facility] [phone]    2017 10:15 AM SIN Pinto OD [facility] - Optometry [phone]      Goals (5 Years of Data)              5/1/17    4/26/17    3/20/17    Blood Pressure < 140/90   175/63  175/63  175/63    BMI is less than 25           HEMOGLOBIN A1C < 7.0             [facility]     [facility] Care [facility] -  Assistance  Unless otherwise directed by your provider, please contact [facility], our nurse care line that is available for  assistance.     Registered nurses in the [facility] provide: appointment scheduling, clinical advisement, health education, and other advisory services.  Call: [phone] (toll free)               Medications                Verify that the below list of medications is an accurate representation of the medications you are currently taking.  If none reported, the list may be blank. If incorrect, please contact your healthcare provider. Carry this list with you in case of emergency.           Current Medications     acetaminophen (TYLENOL EXTRA STRENGTH) 500 MG tablet Take 500 mg by mouth every 6 (six) hours as needed for Pain or Temperature greater than.     alendronate (FOSAMAX) 70 MG tablet TAKE ONE TABLET WEEKLY AS DIRECTED ON THURSDAY    calcium carbonate-vitamin D3 (CALCIUM 600 + D,3,) 600 mg calcium- 200 unit Cap Take 1 capsule by  "mouth 2 (two) times daily.    CRESTOR 10 mg tablet TAKE ONE TABLET BY MOUTH EVERY NIGHT AT BEDTIME    diltiaZEM (CARDIZEM CD) 360 MG 24 hr capsule     dimethicone-kelvin-A-C-E-aloe (GOLD BOND ULTIMATE DIABETICS') 3-30 % Crea Apply topically 2 (two) times daily. APPLY TO FEET    docusate sodium (COLACE) 100 MG capsule Take 100 mg by mouth 2 (two) times daily.    fish oil-omega-3 fatty acids 300-1,000 mg capsule Take 2 g by mouth once daily.    hydrALAZINE (APRESOLINE) 50 MG tablet Take 50 mg by mouth every 12 (twelve) hours.    IRON,CARBONYL/ASCORBIC ACID (ICAR-C ORAL) Take 1 tablet by mouth once daily.    labetalol (NORMODYNE) 300 MG tablet TAKE ONE TABLET BY MOUTH TWICE DAILY FOR BLOOD PRESSURE    LANTUS SOLOSTAR 100 unit/mL (3 mL) InPn pen Inject 35 Units into the skin every evening.     lisinopril (PRINIVIL,ZESTRIL) 20 MG tablet     lutein 20 mg Cap TAKE ONE SOFTGEL BY MOUTH EVERY MORNING    polyethylene glycol (GLYCOLAX) 17 gram/dose powder     TEGRETOL  mg 12 hr tablet TAKE ONE TABLET BY MOUTH ONCE DAILY ***SEIZURES***    triamterene-hydrochlorothiazide 37.5-25 mg (MAXZIDE-25) 37.5-25 mg per tablet Take 1 tablet by mouth once daily.           Clinical Reference Information           Your Vitals Were     Temp Resp Height Weight BMI    97.8 °F (36.6 °C) 20 5' 3" (1.6 m) 89.5 kg (197 lb 5 oz) 34.95 kg/m2      Allergies as of 5/11/2017     No Known Allergies      Immunizations Administered on Date of Encounter - 5/11/2017     None      Language Assistance Services     ATTENTION: Language assistance services are available, free of charge. Please call [phone].      ATENCIÓN: Si eduarda arellano, tiene a noel disposición servicios gratuitos de asistencia lingüística. Llame al [phone].     CHÚ Ý: N?u b?n nói Ti?ng Vi?t, có các d?ch v? h? tr? ngôn ng? mi?n phí dành cho b?n. G?i s? [phone].         [facility] complies with applicable Federal civil rights laws and does not discriminate on " the basis of race, color, national origin, age, disability, or sex.

## 2017-05-11 NOTE — PROGRESS NOTES
HISTORY OF PRESENT ILLNESS:  This is a 76-year-old white female known to Dr. Voss for a diagnosis regarding hypochromic normocytic anemia in association   with elevated alkaline phosphatase levels.  Since her last visit, she has been   placed on Icar-C by Dr. Munoz.  She is now following with Dr. Ras Prado.    To note:  The patient had been hospitalized twice for blocked bowel and had sustained   declining counts (hemoglobin).  She presents to the clinic today with her    as she resides in a group home in [facility].  She denies any fatigue, weakness,   headaches, blurred vision, melena, pica, abdominal discomfort, nausea, vomiting,   constipation, diarrhea, fevers, chills, etc.  No other new complaints or pertinent findings   on a 14-point review of systems.    PHYSICAL EXAMINATION:  GENERAL:  Well-developed, well-nourished elderly white female, mentally   challenged, in no acute distress.  Alert and oriented to person and place.  VITAL SIGNS:  Weight 197.3 pounds (decrease of 2 pounds in six months)  /45, Pulse 63, Respirations 20, Temp 97.8  HEENT:  Normocephalic, atraumatic.  Oral mucosa pink and moist.  Lips without   lesions.  Tongue midline.  Oropharynx clear.  Nonicteric sclerae.   NECK:  Supple, no adenopathy.  No carotid bruits, thyromegaly or thyroid nodule.  HEART:  Regular rate and rhythm without murmur, gallop or rub.   LUNGS:  Clear to auscultation bilaterally.  Normal respiratory effort.   ABDOMEN:  Soft, nontender, nondistended with positive normoactive bowel sounds,   no hepatosplenomegaly.  EXTREMITIES:  No cyanosis, clubbing or edema.  Distal pulses are intact.   AXILLAE AND GROIN:  No palpable pathologic lymphadenopathy is appreciated.     LABORATORY:   Lab Results   Component Value Date    WBC 6.15 05/03/2017    WBC 6.15 05/03/2017    HGB 10.9 (L) 05/03/2017    HGB 10.9 (L) 05/03/2017    HCT 32.9 (L) 05/03/2017    HCT 32.9 (L) 05/03/2017    MCV 92 05/03/2017    MCV 92  05/03/2017     05/03/2017     05/03/2017     75.6% grans, 13.8% lymph  BMP  Lab Results   Component Value Date     05/03/2017     05/03/2017    K 5.5 (H) 05/03/2017    K 5.5 (H) 05/03/2017     05/03/2017     05/03/2017    CO2 28 05/03/2017    CO2 28 05/03/2017    BUN 38 (H) 05/03/2017    BUN 38 (H) 05/03/2017    CREATININE 1.3 05/03/2017    CREATININE 1.3 05/03/2017    CALCIUM 10.1 05/03/2017    CALCIUM 10.1 05/03/2017    ANIONGAP 9 05/03/2017    ANIONGAP 9 05/03/2017    ESTGFRAFRICA 46 (A) 05/03/2017    ESTGFRAFRICA 46 (A) 05/03/2017    EGFRNONAA 40 (A) 05/03/2017    EGFRNONAA 40 (A) 05/03/2017     Lab Results   Component Value Date    IRON 79 05/03/2017    TIBC 408 05/03/2017    FERRITIN 104 05/03/2017     Transferrin 276, saturated iron 19%, sTFR 3.4    IMPRESSION:  1.  Anemia of chronic disease, stable  2.  CKD III.  3.  Fatty infiltration of the liver.    PLAN:    1.  Discontinue ICAR-C, start MVI with iron one daily.  2.  Continue observation and have the patient return in six   months with interval CBC, BMP.    Assessment/plan reviewed and approved by Dr. Jose.

## 2017-06-15 ENCOUNTER — OFFICE VISIT (OUTPATIENT)
Dept: PODIATRY | Facility: CLINIC | Age: 77
End: 2017-06-15
Payer: MEDICARE

## 2017-06-15 VITALS — BODY MASS INDEX: 35.32 KG/M2 | WEIGHT: 199.31 LBS | HEIGHT: 63 IN

## 2017-06-15 DIAGNOSIS — L84 CORN OR CALLUS: ICD-10-CM

## 2017-06-15 DIAGNOSIS — E11.49 TYPE II DIABETES MELLITUS WITH NEUROLOGICAL MANIFESTATIONS: Primary | ICD-10-CM

## 2017-06-15 DIAGNOSIS — B35.1 ONYCHOMYCOSIS DUE TO DERMATOPHYTE: ICD-10-CM

## 2017-06-15 PROCEDURE — 99499 UNLISTED E&M SERVICE: CPT | Mod: S$PBB,,, | Performed by: PODIATRIST

## 2017-06-15 PROCEDURE — 99213 OFFICE O/P EST LOW 20 MIN: CPT | Mod: PBBFAC,PO | Performed by: PODIATRIST

## 2017-06-15 PROCEDURE — 11055 PARING/CUTG B9 HYPRKER LES 1: CPT | Mod: S$PBB,Q9,, | Performed by: PODIATRIST

## 2017-06-15 PROCEDURE — 11721 DEBRIDE NAIL 6 OR MORE: CPT | Mod: PBBFAC,PO | Performed by: PODIATRIST

## 2017-06-15 PROCEDURE — 11055 PARING/CUTG B9 HYPRKER LES 1: CPT | Mod: PBBFAC,PO | Performed by: PODIATRIST

## 2017-06-15 PROCEDURE — 99999 PR PBB SHADOW E&M-EST. PATIENT-LVL III: CPT | Mod: PBBFAC,,, | Performed by: PODIATRIST

## 2017-06-15 PROCEDURE — 11721 DEBRIDE NAIL 6 OR MORE: CPT | Mod: S$PBB,59,Q9, | Performed by: PODIATRIST

## 2017-06-16 NOTE — PROGRESS NOTES
Subjective:      Patient ID: Rebeca Turner is a 76 y.o. female.    Chief Complaint: Diabetic Foot Exam (Dr Prado 5/2017; HgbA1c: 11/2/16  6.9); Nail Care; and Callouses    Rebeca is a 76 y.o. female who presents to the clinic for routine evaluation and treatment of diabetic feet. Rebeca has a past medical history of Anemia of chronic disease; CKD (chronic kidney disease) stage 3, GFR 30-59 ml/min; DM type 2 (diabetes mellitus, type 2); DVT (deep venous thrombosis); Dysplastic nevi; Fatty liver; GERD (gastroesophageal reflux disease); H/O esophagogastroduodenoscopy; History of endometrial cancer; HTN (hypertension); Hyperlipidemia; LVE (left ventricular enlargement); Macular degeneration; MR (mental retardation); Obesity; LUISANA (obstructive sleep apnea); Osteoporosis; S/P colonoscopy; SBO (small bowel obstruction); Seizure disorder; and Vitreous detachment. Patient relates no major problem with feet. Only complaints today consist of toenails and callus in need of trimming.  Denies being painful with wearing shoe gear.  Has not attempted to treat on her own.  Continues to offload the callus of the Rt. 2nd toe with a crest pad.  Denies any additional pedal complaints.      PCP: Dr. Prado  Date Last Seen by PCP: 5/2017    Current shoe gear: [employer].    Hemoglobin A1C   Date Value Ref Range Status   11/02/2016 6.9 (H) 4.5 - 6.2 % Final     Comment:     According to ADA guidelines, hemoglobin A1C <7.0% represents  optimal control in non-pregnant diabetic patients.  Different  metrics may apply to specific populations.   Standards of Medical Care in Diabetes - 2016.  For the purpose of screening for the presence of diabetes:  <5.7%     Consistent with the absence of diabetes  5.7-6.4%  Consistent with increasing risk for diabetes   (prediabetes)  >or=6.5%  Consistent with diabetes  Currently no consensus exists for use of hemoglobin A1C  for diagnosis of diabetes for children.     08/23/2016 6.2 4.5 - 6.2 % Final      Comment:     According to ADA guidelines, hemoglobin A1C <7.0% represents  optimal control in non-pregnant diabetic patients.  Different  metrics may apply to specific populations.   Standards of Medical Care in Diabetes - 2016.  For the purpose of screening for the presence of diabetes:  <5.7%     Consistent with the absence of diabetes  5.7-6.4%  Consistent with increasing risk for diabetes   (prediabetes)  >or=6.5%  Consistent with diabetes  Currently no consensus exists for use of hemoglobin A1C  for diagnosis of diabetes for children.     05/31/2016 6.6 (H) 0.0 - 5.6 % Final     Comment:     Reference Interval:  5.0 - 5.6 Normal   5.7 - 6.4 High Risk   > 6.5 Diabetic    Hgb A1c results are standardized based on the (NGSP) National   Glycohemoglobin Standardization Program.    Hemoglobin A1C levels are related to mean serum/plasma glucose   during the preceding 2-3 months.                Past Medical History:   Diagnosis Date    Anemia of chronic disease     hematology su neg    CKD (chronic kidney disease) stage 3, GFR 30-59 ml/min     DM type 2 (diabetes mellitus, type 2)     DVT (deep venous thrombosis)     x 2 both post-op with last 6/16    Dysplastic nevi     h/o    Fatty liver     noted on usg 9/15    GERD (gastroesophageal reflux disease)     H/O esophagogastroduodenoscopy     normal 11/10    History of endometrial cancer     stage 1;  s/p SENDY with BSO 10/06    HTN (hypertension)     Hyperlipidemia     LVE (left ventricular enlargement)     noted on 6/15 ECHO    Macular degeneration     exudative    MR (mental retardation)     Obesity     LUISANA (obstructive sleep apnea)     su in progress    Osteoporosis     osteopenia noted on 2/13 and 7/15 dexa    S/P colonoscopy     last 10/15;  next 10/19    SBO (small bowel obstruction)     h/o recurrent SBO;  s/p R hemicolectomy 12/10 and s/p incarcerated hernia repair with extensive scar tissue 6/16    Seizure disorder     Vitreous detachment         Past Surgical History:   Procedure Laterality Date    ABDOMINAL SURGERY  06/2016    Incarcerated incisional hernia, enterotomy x3, and extensive adhesions      CATARACT EXTRACTION      B 6/11    CHOLECYSTECTOMY      COLON SURGERY  12/6/2010  (Dinorah, Ms.)    R hemicolectomy 12/10 due to recurrent SBO from benign colon mass 12/2010.   Benign albeit large polyp.    COLONOSCOPY N/A 10/7/2015    Procedure: COLONOSCOPY;  Surgeon: Will Cr Jr., MD;  Location: [facility];  Service: Endoscopy;  Laterality: N/A;    COLONOSCOPY W/ POLYPECTOMY  11/22/2010  Ms Dinorah.    3 cm growth mid ascending colon.  TUBULOVILLOUS ADENOMA. 9 mm polyp mid transverse colon.  TUBULAR ADENOMA.  10 mm polyp distal descending colon.  TUBULOVILLOUS ADENOMA.     ESOPHAGOGASTRODUODENOSCOPY  11/16/2010    Normal EGD.    HERNIA REPAIR  8/2015    from incarcerated hernia    HYSTERECTOMY  10/17/2006    SENDY and BSO 10/06 for stage 1 endometrial cancer    MYRINGOTOMY W/ TUBES      bilateral    TONSILLECTOMY         Family History   Problem Relation Age of Onset    Cancer Sister      details unknown    Heart attack Father     Pulmonary embolism Mother        Social History     Social History    Marital status: Single     Spouse name: N/A    Number of children: N/A    Years of education: N/A     Social History Main Topics    Smoking status: Never Smoker    Smokeless tobacco: Never Used    Alcohol use No    Drug use: No    Sexual activity: No     Other Topics Concern    None     Social History Narrative    L[facility].       Current Outpatient Prescriptions   Medication Sig Dispense Refill    acetaminophen (TYLENOL EXTRA STRENGTH) 500 MG tablet Take 500 mg by mouth every 6 (six) hours as needed for Pain or Temperature greater than.       alendronate (FOSAMAX) 70 MG tablet TAKE ONE TABLET WEEKLY AS DIRECTED ON THURSDAY 4 tablet 0    calcium carbonate-vitamin D3 (CALCIUM 600 + D,3,) 600 mg calcium- 200 unit Cap Take 1  capsule by mouth 2 (two) times daily.      CRESTOR 10 mg tablet TAKE ONE TABLET BY MOUTH EVERY NIGHT AT BEDTIME 30 tablet 0    diltiaZEM (CARDIZEM CD) 360 MG 24 hr capsule       dimethicone-kelvin-A-C-E-aloe (GOLD BOND ULTIMATE DIABETICS') 3-30 % Crea Apply topically 2 (two) times daily. APPLY TO FEET      docusate sodium (COLACE) 100 MG capsule Take 100 mg by mouth 2 (two) times daily.      fish oil-omega-3 fatty acids 300-1,000 mg capsule Take 2 g by mouth once daily.      hydrALAZINE (APRESOLINE) 50 MG tablet Take 50 mg by mouth every 12 (twelve) hours.      labetalol (NORMODYNE) 300 MG tablet TAKE ONE TABLET BY MOUTH TWICE DAILY FOR BLOOD PRESSURE 60 tablet 0    LANTUS SOLOSTAR 100 unit/mL (3 mL) InPn pen Inject 35 Units into the skin every evening.       lisinopril (PRINIVIL,ZESTRIL) 20 MG tablet       lutein 20 mg Cap TAKE ONE SOFTGEL BY MOUTH EVERY MORNING 30 each 0    polyethylene glycol (GLYCOLAX) 17 gram/dose powder       TEGRETOL  mg 12 hr tablet TAKE ONE TABLET BY MOUTH ONCE DAILY SEIZURES (Patient taking differently: one tablet bid) 30 tablet 0    triamterene-hydrochlorothiazide 37.5-25 mg (MAXZIDE-25) 37.5-25 mg per tablet Take 1 tablet by mouth once daily.       No current facility-administered medications for this visit.        Review of patient's allergies indicates:  No Known Allergies      Review of Systems   Constitution: Negative for chills and fever.   Cardiovascular: Negative for claudication and leg swelling.   Skin: Positive for color change and nail changes.   Musculoskeletal: Negative for arthritis, joint pain and joint swelling.   Gastrointestinal: Negative for nausea and vomiting.   Neurological: Positive for paresthesias.   Psychiatric/Behavioral: Negative for altered mental status.           Objective:      Physical Exam   Constitutional: She is oriented to person, place, and time. She appears well-developed and well-nourished. No distress.   Cardiovascular:    Pulses:       Dorsalis pedis pulses are 2+ on the right side, and 2+ on the left side.        Posterior tibial pulses are 1+ on the right side, and 1+ on the left side.   CFT <3 seconds bilateral.  Pedal hair growth decreased bilateral.  Varicosities noted to bilateral lower extremity.  Mild nonpitting edema noted to bilateral lower extremity.  Toes are cool to touch bilateral.     Musculoskeletal: She exhibits edema. She exhibits no tenderness.   Muscle strength 5/5 in all muscle groups bilateral.  No tenderness nor crepitation with ROM of foot/ankle joints bilateral.  No tenderness with palpation of bilateral foot and ankle.  Bilateral pes planus foot type.     Neurological: She is alert and oriented to person, place, and time. She has normal strength. A sensory deficit is present.   Protective sensation per [place] monofilament decreased bilateral.    Vibratory sensation decreased bilateral.    Light touch intact bilateral.   Skin: Skin is warm and dry. Lesion noted. No abrasion, no bruising, no burn, no ecchymosis, no laceration, no petechiae and no rash noted. She is not diaphoretic. No cyanosis or erythema. No pallor. Nails show no clubbing.   Pedal skin appears thin and atrophic bilateral.  Toenails x 10 appear thickened by 2 mm's, elongated by 2 mm's, and discolored with subungual debris.  Hyperkeratotic lesion noted to the Rt. 2nd toe.  Multiple nevi noted to the dorsum of the Lt. Foot. No break in the adjacent skin integrity.              Assessment:       Encounter Diagnoses   Name Primary?    Type II diabetes mellitus with neurological manifestations Yes    Onychomycosis due to dermatophyte     Corn or callus          Plan:       Rebeca was seen today for diabetic foot exam, nail care and callouses.    Diagnoses and all orders for this visit:    Type II diabetes mellitus with neurological manifestations    Onychomycosis due to dermatophyte    Corn or callus      I counseled the patient on her  conditions, their implications and medical management.    Advised to continue wearing the crest pad to offload the Rt. 2nd toe.    Shoe inspection. Diabetic Foot Education. Patient reminded of the importance of good nutrition and blood sugar control to help prevent podiatric complications of diabetes. Patient instructed on proper foot hygeine. We discussed wearing proper shoe gear, daily foot inspections, never walking without protective shoe gear, never putting sharp instruments to feet    With patient's permission, nails were aggressively reduced and debrided x 10 to their soft tissue attachment mechanically and with electric , removing all offending nail and debris.  Also, a sterile #15 scalpel was used to trim lesion x 1 down to smooth appearing skin without incident.  Patient relates relief following the procedure. She will continue to monitor the areas daily, inspect her feet, wear protective shoe gear when ambulatory, moisturizer to maintain skin integrity and follow in this office in approximately 2-3 months, sooner p.r.n.      Return in about 3 months (around 9/15/2017).    Sae Snowden DPM

## 2017-06-19 ENCOUNTER — PROCEDURE VISIT (OUTPATIENT)
Dept: OPHTHALMOLOGY | Facility: CLINIC | Age: 77
End: 2017-06-19
Payer: MEDICARE

## 2017-06-19 VITALS — HEART RATE: 70 BPM | DIASTOLIC BLOOD PRESSURE: 63 MMHG | SYSTOLIC BLOOD PRESSURE: 156 MMHG

## 2017-06-19 DIAGNOSIS — H35.3220 EXUDATIVE AGE-RELATED MACULAR DEGENERATION OF LEFT EYE: ICD-10-CM

## 2017-06-19 DIAGNOSIS — H35.3211 EXUDATIVE AGE-RELATED MACULAR DEGENERATION OF RIGHT EYE WITH ACTIVE CHOROIDAL NEOVASCULARIZATION: Primary | ICD-10-CM

## 2017-06-19 DIAGNOSIS — H43.393 FLOATER, VITREOUS, BILATERAL: ICD-10-CM

## 2017-06-19 PROCEDURE — C9257 BEVACIZUMAB INJECTION: HCPCS | Mod: PBBFAC,PO | Performed by: OPHTHALMOLOGY

## 2017-06-19 PROCEDURE — 92014 COMPRE OPH EXAM EST PT 1/>: CPT | Mod: 25,S$PBB,, | Performed by: OPHTHALMOLOGY

## 2017-06-19 PROCEDURE — 92134 CPTRZ OPH DX IMG PST SGM RTA: CPT | Mod: PBBFAC,PO | Performed by: OPHTHALMOLOGY

## 2017-06-19 PROCEDURE — 67028 INJECTION EYE DRUG: CPT | Mod: S$PBB,RT,, | Performed by: OPHTHALMOLOGY

## 2017-06-19 PROCEDURE — 67028 INJECTION EYE DRUG: CPT | Mod: PBBFAC,PO,RT | Performed by: OPHTHALMOLOGY

## 2017-06-19 RX ADMIN — BEVACIZUMAB 1.25 MG: 100 INJECTION, SOLUTION INTRAVENOUS at 10:06

## 2017-06-19 NOTE — PATIENT INSTRUCTIONS

## 2017-06-19 NOTE — PROGRESS NOTES
Pt Hard of healing    OCT - OD  PED's with SRF  OS - SR Fibrosis - NO SRF      A/P    1. Wet AMD OS  S/p Avastin injections outside  Stable today with cicatrix  Observe    2. Wet AMD OD  S/p Avastin OD x 8  S/p Eylea OD x 5    Avastin OD    Alternate tx      AREDS/AG    3. DM  No significant DR  BS/BP/chol control    4. PCIOL OU      7-8 weeks OCT      Risks, benefits, and alternatives to treatment discussed in detail with the patient.  The patient voiced understanding and wished to proceed with the procedure    Injection Procedure Note:  Diagnosis: Wet AMD OD    Topical Proparacaine and Betadine.  Inject Avastin OD at 6:00 @ 3.5-4mm posterior to limbus  Post Operative Dx: Same  Complications: None  Follow up as above.

## 2017-07-07 ENCOUNTER — HOSPITAL ENCOUNTER (OUTPATIENT)
Dept: RADIOLOGY | Facility: HOSPITAL | Age: 77
Discharge: HOME | End: 2017-07-07
Attending: PSYCHIATRY & NEUROLOGY
Payer: MEDICARE

## 2017-07-07 DIAGNOSIS — M85.80 OSTEOPENIA, UNSPECIFIED LOCATION: ICD-10-CM

## 2017-07-07 DIAGNOSIS — M89.9 BONE DISORDER: ICD-10-CM

## 2017-07-07 PROCEDURE — 77080 DXA BONE DENSITY AXIAL: CPT | Mod: TC,PO

## 2017-07-07 PROCEDURE — 77080 DXA BONE DENSITY AXIAL: CPT | Mod: 26,,, | Performed by: RADIOLOGY

## 2017-07-10 ENCOUNTER — OFFICE VISIT (OUTPATIENT)
Dept: OTOLARYNGOLOGY | Facility: CLINIC | Age: 77
End: 2017-07-10
Payer: MEDICARE

## 2017-07-10 ENCOUNTER — CLINICAL SUPPORT (OUTPATIENT)
Dept: AUDIOLOGY | Facility: CLINIC | Age: 77
End: 2017-07-10
Payer: MEDICARE

## 2017-07-10 VITALS — WEIGHT: 199.31 LBS | BODY MASS INDEX: 35.32 KG/M2 | HEIGHT: 63 IN

## 2017-07-10 DIAGNOSIS — H90.3 BILATERAL SENSORINEURAL HEARING LOSS: ICD-10-CM

## 2017-07-10 DIAGNOSIS — H90.3 BILATERAL SENSORINEURAL HEARING LOSS: Primary | ICD-10-CM

## 2017-07-10 DIAGNOSIS — Z97.4 WEARS HEARING AID: ICD-10-CM

## 2017-07-10 DIAGNOSIS — H93.293 IMPAIRED AUDITORY DISCRIMINATION, BILATERAL: ICD-10-CM

## 2017-07-10 DIAGNOSIS — H61.23 BILATERAL IMPACTED CERUMEN: ICD-10-CM

## 2017-07-10 DIAGNOSIS — Z97.4 WEARS HEARING AID: Primary | ICD-10-CM

## 2017-07-10 DIAGNOSIS — H91.93 HEARING DIFFICULTY, BILATERAL: Primary | ICD-10-CM

## 2017-07-10 PROCEDURE — 92557 COMPREHENSIVE HEARING TEST: CPT | Mod: PBBFAC,PO | Performed by: AUDIOLOGIST

## 2017-07-10 PROCEDURE — 99999 PR PBB SHADOW E&M-EST. PATIENT-LVL III: CPT | Mod: PBBFAC,,, | Performed by: NURSE PRACTITIONER

## 2017-07-10 PROCEDURE — 92567 TYMPANOMETRY: CPT | Mod: PBBFAC,PO | Performed by: AUDIOLOGIST

## 2017-07-10 PROCEDURE — G0268 REMOVAL OF IMPACTED WAX MD: HCPCS | Mod: PBBFAC,PO | Performed by: NURSE PRACTITIONER

## 2017-07-10 PROCEDURE — 1126F AMNT PAIN NOTED NONE PRSNT: CPT | Mod: ,,, | Performed by: NURSE PRACTITIONER

## 2017-07-10 PROCEDURE — G0268 REMOVAL OF IMPACTED WAX MD: HCPCS | Mod: S$PBB,,, | Performed by: NURSE PRACTITIONER

## 2017-07-10 PROCEDURE — 99213 OFFICE O/P EST LOW 20 MIN: CPT | Mod: PBBFAC,PO,25 | Performed by: NURSE PRACTITIONER

## 2017-07-10 PROCEDURE — 99213 OFFICE O/P EST LOW 20 MIN: CPT | Mod: 25,S$PBB,, | Performed by: NURSE PRACTITIONER

## 2017-07-10 PROCEDURE — 1159F MED LIST DOCD IN RCRD: CPT | Mod: ,,, | Performed by: NURSE PRACTITIONER

## 2017-07-10 NOTE — PROGRESS NOTES
Subjective:       Patient ID: Rebeca Turner is a 76 y.o. female.    Chief Complaint: Cerumen Impaction and Hearing Loss    HPI   Patient returns today for her annual audiogram. She wears bilateral hearing aids and has severe to profound predominantly sensorineural hearing loss. She has worn full-shell behind-the-ear (BTE) hearing aids X 15 years. She resides [facility]. She denies current ENT symptoms or complaints.     Review of Systems   Constitutional: Negative.    HENT: Positive for hearing loss.    Eyes: Negative.    Respiratory: Negative.    Cardiovascular: Negative.    Gastrointestinal: Negative for abdominal pain, nausea and vomiting.   Musculoskeletal: Negative.    Skin: Negative.    Neurological: Negative.    Psychiatric/Behavioral: Negative.        Objective:      Physical Exam   Constitutional: She is oriented to person, place, and time. Vital signs are normal. She appears well-developed and well-nourished. She is cooperative. She does not appear ill. No distress.   HENT:   Head: Normocephalic and atraumatic.   Right Ear: Hearing, tympanic membrane, external ear and ear canal normal. Tympanic membrane is not erythematous. No middle ear effusion.   Left Ear: Hearing, tympanic membrane, external ear and ear canal normal. Tympanic membrane is not erythematous.  No middle ear effusion.   Nose: Nose normal. No mucosal edema, rhinorrhea or septal deviation. Right sinus exhibits no maxillary sinus tenderness and no frontal sinus tenderness. Left sinus exhibits no maxillary sinus tenderness and no frontal sinus tenderness.   Mouth/Throat: Uvula is midline, oropharynx is clear and moist and mucous membranes are normal. Mucous membranes are not pale, not dry and not cyanotic. No oral lesions. No oropharyngeal exudate, posterior oropharyngeal edema or posterior oropharyngeal erythema.     SEPARATE PROCEDURE IN OFFICE:   Procedure: Removal of impacted cerumen,  bilateral   Pre Procedure Diagnosis: Cerumen Impaction   Post Procedure Diagnosis: Cerumen Impaction   Verbal informed consent in regards to risk of trauma to ear canal, ear drum or hearing, discomfort during procedure and/or inability to remove cerumen impaction in one session or unforeseen events or complications.   No anesthesia.     Procedure in detail:   Ear canal visualized bilateral with appropriate size ear speculum utilizing Operating Head Binocular Otomicroscope   Utilizing the following: Ring curet, alligator forceps AU. The impacted cerumen of the ear canals was removed atraumatically. The TM and EAC were then inspected and found to be clear of wax. See description of TMs/EACs in PE above.   Complications: No   Condition: Improved/Good     Eyes: Conjunctivae, EOM and lids are normal. Pupils are equal, round, and reactive to light. Right eye exhibits no discharge. Left eye exhibits no discharge. No scleral icterus.   Neck: Trachea normal and normal range of motion. Neck supple. No tracheal deviation present. No thyroid mass and no thyromegaly present.   Cardiovascular: Normal rate.    Pulmonary/Chest: Effort normal. No stridor. No respiratory distress. She has no wheezes.   Musculoskeletal: Normal range of motion.   Ambulates with rolling walker   Lymphadenopathy:        Head (right side): No submental, no submandibular, no tonsillar, no preauricular and no posterior auricular adenopathy present.        Head (left side): No submental, no submandibular, no tonsillar, no preauricular and no posterior auricular adenopathy present.     She has no cervical adenopathy.        Right cervical: No superficial cervical and no posterior cervical adenopathy present.       Left cervical: No superficial cervical and no posterior cervical adenopathy present.   Neurological: She is alert and oriented to person, place, and time. She has normal strength. Coordination and gait normal.   Skin: Skin is warm, dry and intact.  No lesion and no rash noted. She is not diaphoretic. No cyanosis. No pallor.   Psychiatric: She has a normal mood and affect. Her speech is normal and behavior is normal. Judgment and thought content normal. Cognition and memory are normal.   Nursing note and vitals reviewed.    Assessment:     Cerumen impactions removed AU    Moderately-severe to profound sensorineural hearing loss AU  Impaired auditory discrimination AU  Wears hearing aids bilaterally  Plan:     Recommend continued daily use of binaural amplification    Recommend annual audiogram  Return to clinic in one year and as needed for ENT concerns

## 2017-07-13 ENCOUNTER — TELEPHONE (OUTPATIENT)
Dept: AUDIOLOGY | Facility: CLINIC | Age: 77
End: 2017-07-13

## 2017-07-13 NOTE — TELEPHONE ENCOUNTER
----- Message from Anny Bagley LPN sent at 7/10/2017 11:47 AM CDT -----  Contact:  [facility] [place]   [phone]       ----- Message -----  From: Monica Galindo  Sent: 7/10/2017  11:20 AM  To: [facility] Staff    Joe from [facility] called about the recommendations you made for this patient. Please call back to Joe Fu [phone]       7/13/17: Returned phone call and LMOV for Joe to call office back. Provided direct phone number.

## 2017-07-25 ENCOUNTER — OFFICE VISIT (OUTPATIENT)
Dept: OPTOMETRY | Facility: CLINIC | Age: 77
End: 2017-07-25
Payer: MEDICARE

## 2017-07-25 DIAGNOSIS — H52.13 MYOPIA WITH ASTIGMATISM, BILATERAL: Primary | ICD-10-CM

## 2017-07-25 DIAGNOSIS — H52.203 MYOPIA WITH ASTIGMATISM, BILATERAL: Primary | ICD-10-CM

## 2017-07-25 PROCEDURE — 99999 PR PBB SHADOW E&M-EST. PATIENT-LVL III: CPT | Mod: PBBFAC,,, | Performed by: OPTOMETRIST

## 2017-07-25 PROCEDURE — 99213 OFFICE O/P EST LOW 20 MIN: CPT | Mod: PBBFAC,PO | Performed by: OPTOMETRIST

## 2017-07-25 PROCEDURE — 92015 DETERMINE REFRACTIVE STATE: CPT | Mod: ,,, | Performed by: OPTOMETRIST

## 2017-07-25 NOTE — PROGRESS NOTES
HPI     Blurred Vision    Additional comments: pt here for refraction only -- slight decrease at   both near & distance           Comments   Refraction only  appt w/ retina august 2017       Last edited by SIN Pinto, OD on 7/25/2017 10:48 AM. (History)        ROS     Positive for: Eyes    Negative for: Constitutional, Gastrointestinal, Neurological, Skin,   Genitourinary, Musculoskeletal, HENT, Endocrine, Cardiovascular,   Respiratory, Psychiatric, Allergic/Imm, Heme/Lymph    Last edited by SIN Pinto, OD on 7/25/2017 10:48 AM. (History)        Assessment /Plan     For exam results, see Encounter Report.    Myopia with astigmatism, bilateral      Updated refraction--gave copy, fill prn  Retina visit f/u already scheduled for August

## 2017-08-07 ENCOUNTER — PROCEDURE VISIT (OUTPATIENT)
Dept: OPHTHALMOLOGY | Facility: CLINIC | Age: 77
End: 2017-08-07
Payer: MEDICARE

## 2017-08-07 DIAGNOSIS — H35.3211 EXUDATIVE AGE-RELATED MACULAR DEGENERATION OF RIGHT EYE WITH ACTIVE CHOROIDAL NEOVASCULARIZATION: Primary | ICD-10-CM

## 2017-08-07 DIAGNOSIS — H35.3110 NONEXUDATIVE AGE-RELATED MACULAR DEGENERATION OF RIGHT EYE: ICD-10-CM

## 2017-08-07 DIAGNOSIS — H43.393 FLOATER, VITREOUS, BILATERAL: ICD-10-CM

## 2017-08-07 PROCEDURE — 67028 INJECTION EYE DRUG: CPT | Mod: S$PBB,RT,, | Performed by: OPHTHALMOLOGY

## 2017-08-07 PROCEDURE — 92014 COMPRE OPH EXAM EST PT 1/>: CPT | Mod: 25,S$PBB,, | Performed by: OPHTHALMOLOGY

## 2017-08-07 PROCEDURE — 92134 CPTRZ OPH DX IMG PST SGM RTA: CPT | Mod: PBBFAC,PO | Performed by: OPHTHALMOLOGY

## 2017-08-07 PROCEDURE — 67028 INJECTION EYE DRUG: CPT | Mod: PBBFAC,PO,RT | Performed by: OPHTHALMOLOGY

## 2017-08-07 RX ADMIN — AFLIBERCEPT 2 MG: 40 INJECTION, SOLUTION INTRAVITREAL at 11:08

## 2017-08-07 NOTE — PATIENT INSTRUCTIONS
POST INTRAVITREAL INJECTION PATIENT INSTRUCTIONS   Below are some guidelines for what to expect after your treatment and additional care instructions.   * you may experience mild discomfort in your eye after the treatment. Your eye usually feels better within 24 to 48 hours after the procedure.   * you have been given drops that numb the surface of your eye.   DO NOT rub or touch your eye, DO NOT wear contacts until numbness goes away.   * you may experience small spots that appear in your field of vision, these are usually caused by an air bubble or from the medication. It taked a few hours or days for these to go away.   * use of sunglasses will help reduce light sensitivity and glare.   * DO NOT swim or put sink water ( tap water ) or swin for at least 24 hours after the injection   * If you have a gritty, burning, irritating or stinging feeling in the injected eye. This may be a result of the antiseptic used. Use artifical tears or eye lubricant ( from over- the- counter from your local pharmacy ). If you have some at home already please check the expiration date, so not to use anything contaminated in your eye. A cool pack over your eye brow above the injected eye may also relieve discomfort.   Call us right away or go to the Emergency Department if you have a dramatic decrease in vision or extreme pain in the eye.   [facility] [phone]

## 2017-08-07 NOTE — PROGRESS NOTES
SHELDON GARCIA 6/19/17- Dr. Sue          7/25/17 Dr. Pinto  Pt is non verbal today-did call out letters for visual acuity test.  Does not answer when directly spoken to.  Doing large print word puzzles in waiting area.  Caretaker states pt is not wearing hearing aids today and that's why she is not responding.             Pt Hard of healing    OCT - OD  PED's with SRF  OS - SR Fibrosis - NO SRF      A/P    1. Wet AMD OS  S/p Avastin injections outside  Stable today with cicatrix  Observe    2. Wet AMD OD  S/p Avastin OD x 9  S/p Eylea OD x 5    EYlea OD    Alternate tx      AREDS/AG    3. DM  No significant DR  BS/BP/chol control    4. PCIOL OU      7-8 weeks OCT      Risks, benefits, and alternatives to treatment discussed in detail with the patient.  The patient voiced understanding and wished to proceed with the procedure    Injection Procedure Note:  Diagnosis: Wet AMD OD    Topical Proparacaine and Betadine.  Inject Eylea OD at 6:00 @ 3.5-4mm posterior to limbus  Post Operative Dx: Same  Complications: None  Follow up as above.

## 2017-08-08 NOTE — PROGRESS NOTES
Audiological evaluation completed per order from Natalee Vincent.     Audiogram results were reviewed in detail with patient and all questions were answered.    Recommend continued daily use of binaural amplification and annual audiogram to monitor hearing loss.

## 2017-10-02 ENCOUNTER — PROCEDURE VISIT (OUTPATIENT)
Dept: OPHTHALMOLOGY | Facility: CLINIC | Age: 77
End: 2017-10-02
Payer: MEDICARE

## 2017-10-02 VITALS — HEART RATE: 74 BPM | SYSTOLIC BLOOD PRESSURE: 165 MMHG | DIASTOLIC BLOOD PRESSURE: 60 MMHG

## 2017-10-02 DIAGNOSIS — H35.3211 EXUDATIVE AGE-RELATED MACULAR DEGENERATION OF RIGHT EYE WITH ACTIVE CHOROIDAL NEOVASCULARIZATION: Primary | ICD-10-CM

## 2017-10-02 DIAGNOSIS — H43.393 VITREOUS FLOATERS OF BOTH EYES: ICD-10-CM

## 2017-10-02 DIAGNOSIS — H35.3220 EXUDATIVE AGE-RELATED MACULAR DEGENERATION OF LEFT EYE: ICD-10-CM

## 2017-10-02 PROCEDURE — 92014 COMPRE OPH EXAM EST PT 1/>: CPT | Mod: 25,S$PBB,, | Performed by: OPHTHALMOLOGY

## 2017-10-02 PROCEDURE — 67028 INJECTION EYE DRUG: CPT | Mod: S$PBB,RT,, | Performed by: OPHTHALMOLOGY

## 2017-10-02 PROCEDURE — 67028 INJECTION EYE DRUG: CPT | Mod: PBBFAC,PO,RT | Performed by: OPHTHALMOLOGY

## 2017-10-02 PROCEDURE — C9257 BEVACIZUMAB INJECTION: HCPCS | Mod: PBBFAC,PO | Performed by: OPHTHALMOLOGY

## 2017-10-02 PROCEDURE — 92134 CPTRZ OPH DX IMG PST SGM RTA: CPT | Mod: PBBFAC,PO | Performed by: OPHTHALMOLOGY

## 2017-10-02 RX ADMIN — BEVACIZUMAB 1.25 MG: 100 INJECTION, SOLUTION INTRAVENOUS at 11:10

## 2017-10-02 NOTE — PROGRESS NOTES
HPI     DLS 8/7/17- Dr. Sue            Pt  States having trouble reading certain print.                 OCT - OD  PED's with SRF  OS - SR Fibrosis - NO SRF      A/P    1. Wet AMD OS  S/p Avastin injections outside  Stable today with cicatrix  Observe    2. Wet AMD OD  S/p Avastin OD x 9  S/p Eylea OD x 6    Avastin OD    Alternate tx      AREDS/AG    3. DM  No significant DR  BS/BP/chol control    4. PCIOL OU      9-10 weeks OCT      Risks, benefits, and alternatives to treatment discussed in detail with the patient.  The patient voiced understanding and wished to proceed with the procedure    Injection Procedure Note:  Diagnosis: Wet AMD OD    Topical Proparacaine and Betadine.  Injec Avastin OD at 6:00 @ 3.5-4mm posterior to limbus  Post Operative Dx: Same  Complications: None  Follow up as above.

## 2017-10-02 NOTE — PATIENT INSTRUCTIONS

## 2017-10-03 ENCOUNTER — HOSPITAL ENCOUNTER (OUTPATIENT)
Dept: RADIOLOGY | Facility: HOSPITAL | Age: 77
Discharge: HOME | End: 2017-10-03
Attending: INTERNAL MEDICINE
Payer: MEDICARE

## 2017-10-03 DIAGNOSIS — Z12.31 ENCOUNTER FOR SCREENING MAMMOGRAM FOR MALIGNANT NEOPLASM OF BREAST: ICD-10-CM

## 2017-10-03 PROCEDURE — 77063 BREAST TOMOSYNTHESIS BI: CPT | Mod: 26,,, | Performed by: RADIOLOGY

## 2017-10-03 PROCEDURE — 77067 SCR MAMMO BI INCL CAD: CPT | Mod: TC

## 2017-10-03 PROCEDURE — 77067 SCR MAMMO BI INCL CAD: CPT | Mod: 26,,, | Performed by: RADIOLOGY

## 2017-10-05 ENCOUNTER — OFFICE VISIT (OUTPATIENT)
Dept: PODIATRY | Facility: CLINIC | Age: 77
End: 2017-10-05
Payer: MEDICARE

## 2017-10-05 VITALS — WEIGHT: 199.75 LBS | BODY MASS INDEX: 35.39 KG/M2 | HEIGHT: 63 IN

## 2017-10-05 DIAGNOSIS — L84 CORN OR CALLUS: ICD-10-CM

## 2017-10-05 DIAGNOSIS — B35.1 ONYCHOMYCOSIS DUE TO DERMATOPHYTE: ICD-10-CM

## 2017-10-05 DIAGNOSIS — M20.42 HAMMER TOES OF BOTH FEET: ICD-10-CM

## 2017-10-05 DIAGNOSIS — M20.41 HAMMER TOES OF BOTH FEET: ICD-10-CM

## 2017-10-05 DIAGNOSIS — E11.49 TYPE II DIABETES MELLITUS WITH NEUROLOGICAL MANIFESTATIONS: Primary | ICD-10-CM

## 2017-10-05 PROCEDURE — 11055 PARING/CUTG B9 HYPRKER LES 1: CPT | Mod: PBBFAC,PO | Performed by: PODIATRIST

## 2017-10-05 PROCEDURE — 11721 DEBRIDE NAIL 6 OR MORE: CPT | Mod: PBBFAC,PO | Performed by: PODIATRIST

## 2017-10-05 PROCEDURE — 99499 UNLISTED E&M SERVICE: CPT | Mod: S$PBB,,, | Performed by: PODIATRIST

## 2017-10-05 PROCEDURE — 99999 PR PBB SHADOW E&M-EST. PATIENT-LVL III: CPT | Mod: PBBFAC,,, | Performed by: PODIATRIST

## 2017-10-05 PROCEDURE — 11721 DEBRIDE NAIL 6 OR MORE: CPT | Mod: S$PBB,59,Q9, | Performed by: PODIATRIST

## 2017-10-05 PROCEDURE — 11055 PARING/CUTG B9 HYPRKER LES 1: CPT | Mod: S$PBB,Q9,, | Performed by: PODIATRIST

## 2017-10-05 PROCEDURE — 99213 OFFICE O/P EST LOW 20 MIN: CPT | Mod: PBBFAC,PO | Performed by: PODIATRIST

## 2017-10-05 NOTE — PROGRESS NOTES
Subjective:      Patient ID: Rebeca Turner is a 77 y.o. female.    Chief Complaint: Diabetic Foot Exam (PCP Alexander 7/8/17  A1C 11/2/18   6.9); Nail Care; and Foot Problem (Corne on Rt foot 5th toe)    Rebeca is a 77 y.o. female who presents to the clinic for routine evaluation and treatment of diabetic feet. Rebeca has a past medical history of Anemia of chronic disease; CKD (chronic kidney disease) stage 3, GFR 30-59 ml/min; DM type 2 (diabetes mellitus, type 2); DVT (deep venous thrombosis); Dysplastic nevi; Fatty liver; GERD (gastroesophageal reflux disease); H/O esophagogastroduodenoscopy; History of endometrial cancer; HTN (hypertension); Hyperlipidemia; LVE (left ventricular enlargement); Macular degeneration; MR (mental retardation); Obesity; LUISANA (obstructive sleep apnea); Osteoporosis; S/P colonoscopy; SBO (small bowel obstruction); Seizure disorder; and Vitreous detachment. Patient relates no major problem with feet. Only complaints today consist of toenails and a callus in need of trimming.  Denies being painful with wearing shoe gear.  Has not attempted to treat on her own.  Denies any additional pedal complaints.      PCP: Dr. Munoz  Date Last Seen by PCP: 7/8/17      Hemoglobin A1C   Date Value Ref Range Status   11/02/2016 6.9 (H) 4.5 - 6.2 % Final     Comment:     According to ADA guidelines, hemoglobin A1C <7.0% represents  optimal control in non-pregnant diabetic patients.  Different  metrics may apply to specific populations.   Standards of Medical Care in Diabetes - 2016.  For the purpose of screening for the presence of diabetes:  <5.7%     Consistent with the absence of diabetes  5.7-6.4%  Consistent with increasing risk for diabetes   (prediabetes)  >or=6.5%  Consistent with diabetes  Currently no consensus exists for use of hemoglobin A1C  for diagnosis of diabetes for children.     08/23/2016 6.2 4.5 - 6.2 % Final     Comment:     According to ADA guidelines, hemoglobin A1C <7.0%  represents  optimal control in non-pregnant diabetic patients.  Different  metrics may apply to specific populations.   Standards of Medical Care in Diabetes - 2016.  For the purpose of screening for the presence of diabetes:  <5.7%     Consistent with the absence of diabetes  5.7-6.4%  Consistent with increasing risk for diabetes   (prediabetes)  >or=6.5%  Consistent with diabetes  Currently no consensus exists for use of hemoglobin A1C  for diagnosis of diabetes for children.     05/31/2016 6.6 (H) 0.0 - 5.6 % Final     Comment:     Reference Interval:  5.0 - 5.6 Normal   5.7 - 6.4 High Risk   > 6.5 Diabetic    Hgb A1c results are standardized based on the (NGSP) National   Glycohemoglobin Standardization Program.    Hemoglobin A1C levels are related to mean serum/plasma glucose   during the preceding 2-3 months.                Past Medical History:   Diagnosis Date    Anemia of chronic disease     hematology su neg    CKD (chronic kidney disease) stage 3, GFR 30-59 ml/min     DM type 2 (diabetes mellitus, type 2)     DVT (deep venous thrombosis)     x 2 both post-op with last 6/16    Dysplastic nevi     h/o    Fatty liver     noted on usg 9/15    GERD (gastroesophageal reflux disease)     H/O esophagogastroduodenoscopy     normal 11/10    History of endometrial cancer     stage 1;  s/p SENDY with BSO 10/06    HTN (hypertension)     Hyperlipidemia     LVE (left ventricular enlargement)     noted on 6/15 ECHO    Macular degeneration     exudative    MR (mental retardation)     Obesity     LUISANA (obstructive sleep apnea)     su in progress    Osteoporosis     osteopenia noted on 2/13 and 7/15 dexa    S/P colonoscopy     last 10/15;  next 10/19    SBO (small bowel obstruction)     h/o recurrent SBO;  s/p R hemicolectomy 12/10 and s/p incarcerated hernia repair with extensive scar tissue 6/16    Seizure disorder     Vitreous detachment        Past Surgical History:   Procedure Laterality Date     ABDOMINAL SURGERY  06/2016    Incarcerated incisional hernia, enterotomy x3, and extensive adhesions      CATARACT EXTRACTION      B 6/11    CHOLECYSTECTOMY      COLON SURGERY  12/6/2010  (Dinorah, Ms.)    R hemicolectomy 12/10 due to recurrent SBO from benign colon mass 12/2010.   Benign albeit large polyp.    COLONOSCOPY N/A 10/7/2015    Procedure: COLONOSCOPY;  Surgeon: Will Cr Jr., MD;  Location: [facility];  Service: Endoscopy;  Laterality: N/A;    COLONOSCOPY W/ POLYPECTOMY  11/22/2010  Ms Dinorah.    3 cm growth mid ascending colon.  TUBULOVILLOUS ADENOMA. 9 mm polyp mid transverse colon.  TUBULAR ADENOMA.  10 mm polyp distal descending colon.  TUBULOVILLOUS ADENOMA.     ESOPHAGOGASTRODUODENOSCOPY  11/16/2010    Normal EGD.    HERNIA REPAIR  8/2015    from incarcerated hernia    HYSTERECTOMY  10/17/2006    SENDY and BSO 10/06 for stage 1 endometrial cancer    MYRINGOTOMY W/ TUBES      bilateral    TONSILLECTOMY         Family History   Problem Relation Age of Onset    Cancer Sister      details unknown    Heart attack Father     Pulmonary embolism Mother        Social History     Social History    Marital status: Single     Spouse name: N/A    Number of children: N/A    Years of education: N/A     Social History Main Topics    Smoking status: Never Smoker    Smokeless tobacco: Never Used    Alcohol use No    Drug use: No    Sexual activity: No     Other Topics Concern    None     Social History Narrative    Lives at [facility].       Current Outpatient Prescriptions   Medication Sig Dispense Refill    acetaminophen (TYLENOL EXTRA STRENGTH) 500 MG tablet Take 500 mg by mouth every 6 (six) hours as needed for Pain or Temperature greater than.       alendronate (FOSAMAX) 70 MG tablet TAKE ONE TABLET WEEKLY AS DIRECTED ON THURSDAY 4 tablet 0    calcium carbonate-vitamin D3 (CALCIUM 600 + D,3,) 600 mg calcium- 200 unit Cap Take 1 capsule by mouth 2 (two) times daily.      CRESTOR 10 mg  tablet TAKE ONE TABLET BY MOUTH EVERY NIGHT AT BEDTIME 30 tablet 0    diltiaZEM (CARDIZEM CD) 360 MG 24 hr capsule       dimethicone-kelvin-A-C-E-aloe (GOLD BOND ULTIMATE DIABETICS') 3-30 % Crea Apply topically 2 (two) times daily. APPLY TO FEET      docusate sodium (COLACE) 100 MG capsule Take 100 mg by mouth 2 (two) times daily.      fish oil-omega-3 fatty acids 300-1,000 mg capsule Take 2 g by mouth once daily.      hydrALAZINE (APRESOLINE) 50 MG tablet Take 50 mg by mouth every 12 (twelve) hours.      labetalol (NORMODYNE) 300 MG tablet TAKE ONE TABLET BY MOUTH TWICE DAILY FOR BLOOD PRESSURE 60 tablet 0    LANTUS SOLOSTAR 100 unit/mL (3 mL) InPn pen Inject 35 Units into the skin every evening.       lisinopril (PRINIVIL,ZESTRIL) 20 MG tablet       lutein 20 mg Cap TAKE ONE SOFTGEL BY MOUTH EVERY MORNING 30 each 0    polyethylene glycol (GLYCOLAX) 17 gram/dose powder       TEGRETOL  mg 12 hr tablet TAKE ONE TABLET BY MOUTH ONCE DAILY SEIZURES (Patient taking differently: one tablet bid) 30 tablet 0    triamterene-hydrochlorothiazide 37.5-25 mg (MAXZIDE-25) 37.5-25 mg per tablet Take 1 tablet by mouth once daily.       No current facility-administered medications for this visit.        Review of patient's allergies indicates:  No Known Allergies      Review of Systems   Constitution: Negative for chills and fever.   Cardiovascular: Negative for claudication and leg swelling.   Skin: Positive for color change and nail changes.   Musculoskeletal: Negative for arthritis, joint pain and joint swelling.   Gastrointestinal: Negative for nausea and vomiting.   Neurological: Positive for paresthesias.   Psychiatric/Behavioral: Negative for altered mental status.           Objective:      Physical Exam   Constitutional: She is oriented to person, place, and time. She appears well-developed and well-nourished. No distress.   Cardiovascular:   Pulses:       Dorsalis pedis pulses are 2+ on the right side, and  2+ on the left side.        Posterior tibial pulses are 1+ on the right side, and 1+ on the left side.   CFT <3 seconds bilateral.  Pedal hair growth decreased bilateral.  Varicosities noted to bilateral lower extremity.  Mild nonpitting edema noted to bilateral lower extremity.  Toes are cool to touch bilateral.     Musculoskeletal: She exhibits edema. She exhibits no tenderness.   Muscle strength 5/5 in all muscle groups bilateral.  No tenderness nor crepitation with ROM of foot/ankle joints bilateral.  No tenderness with palpation of bilateral foot and ankle.  Bilateral pes planus foot type.     Neurological: She is alert and oriented to person, place, and time. She has normal strength. A sensory deficit is present.   Protective sensation per Adrian-Julianne monofilament decreased bilateral.    Vibratory sensation decreased bilateral.    Light touch intact bilateral.   Skin: Skin is warm and dry. Lesion noted. No abrasion, no bruising, no burn, no ecchymosis, no laceration, no petechiae and no rash noted. She is not diaphoretic. No cyanosis or erythema. No pallor. Nails show no clubbing.   Pedal skin appears thin and atrophic bilateral.  Toenails x 10 appear thickened by 2 mm's, elongated by 2 mm's, and discolored with subungual debris.  Hyperkeratotic lesion noted to the distal tip of the Rt. 2nd toe.  Multiple nevi noted to the dorsum of the Lt. Foot. No break in the adjacent skin integrity.              Assessment:       Encounter Diagnoses   Name Primary?    Type II diabetes mellitus with neurological manifestations Yes    Onychomycosis due to dermatophyte     Corn or callus     Hammer toes of both feet          Plan:       Rebeca was seen today for diabetic foot exam, nail care and foot problem.    Diagnoses and all orders for this visit:    Type II diabetes mellitus with neurological manifestations    Onychomycosis due to dermatophyte    Corn or callus    Hammer toes of both feet      I counseled the  patient on her conditions, their implications and medical management.    Advised to continue wearing the crest pad to offload the Rt. 2nd toe.    Advised to continue wearing shoes that accommodate digital deformities.    Shoe inspection. Diabetic Foot Education. Patient reminded of the importance of good nutrition and blood sugar control to help prevent podiatric complications of diabetes. Patient instructed on proper foot hygeine. We discussed wearing proper shoe gear, daily foot inspections, never walking without protective shoe gear, never putting sharp instruments to feet    With patient's permission, nails were aggressively reduced and debrided x 10 to their soft tissue attachment mechanically and with electric , removing all offending nail and debris.  Also, a sterile #15 scalpel was used to trim lesion x 1 down to smooth appearing skin without incident.  Patient relates relief following the procedure. She will continue to monitor the areas daily, inspect her feet, wear protective shoe gear when ambulatory, moisturizer to maintain skin integrity and follow in this office in approximately 2-3 months, sooner p.r.n.      Return in about 3 months (around 1/5/2018).    Sae Snowden DPM

## 2017-10-18 ENCOUNTER — HOSPITAL ENCOUNTER (OUTPATIENT)
Dept: RADIOLOGY | Facility: HOSPITAL | Age: 77
Discharge: HOME | End: 2017-10-18
Attending: FAMILY MEDICINE
Payer: MEDICARE

## 2017-10-18 DIAGNOSIS — R92.8 ABNORMAL MAMMOGRAM OF RIGHT BREAST: ICD-10-CM

## 2017-10-18 PROCEDURE — 77061 BREAST TOMOSYNTHESIS UNI: CPT | Mod: 26,,, | Performed by: RADIOLOGY

## 2017-10-18 PROCEDURE — 76642 ULTRASOUND BREAST LIMITED: CPT | Mod: 26,RT,, | Performed by: RADIOLOGY

## 2017-10-18 PROCEDURE — 77065 DX MAMMO INCL CAD UNI: CPT | Mod: 26,,, | Performed by: RADIOLOGY

## 2017-10-18 PROCEDURE — 77061 BREAST TOMOSYNTHESIS UNI: CPT | Mod: TC

## 2017-10-18 PROCEDURE — 76642 ULTRASOUND BREAST LIMITED: CPT | Mod: TC,PO,RT

## 2017-12-04 ENCOUNTER — PROCEDURE VISIT (OUTPATIENT)
Dept: OPHTHALMOLOGY | Facility: CLINIC | Age: 77
End: 2017-12-04
Payer: MEDICARE

## 2017-12-04 DIAGNOSIS — H35.3222 EXUDATIVE AGE-RELATED MACULAR DEGENERATION OF LEFT EYE WITH INACTIVE CHOROIDAL NEOVASCULARIZATION: ICD-10-CM

## 2017-12-04 DIAGNOSIS — H43.393 VITREOUS FLOATERS OF BOTH EYES: ICD-10-CM

## 2017-12-04 DIAGNOSIS — H35.3211 EXUDATIVE AGE-RELATED MACULAR DEGENERATION OF RIGHT EYE WITH ACTIVE CHOROIDAL NEOVASCULARIZATION: Primary | ICD-10-CM

## 2017-12-04 PROCEDURE — 67028 INJECTION EYE DRUG: CPT | Mod: PBBFAC,PO,RT | Performed by: OPHTHALMOLOGY

## 2017-12-04 PROCEDURE — 92134 CPTRZ OPH DX IMG PST SGM RTA: CPT | Mod: PBBFAC,PO | Performed by: OPHTHALMOLOGY

## 2017-12-04 PROCEDURE — 92014 COMPRE OPH EXAM EST PT 1/>: CPT | Mod: 25,S$PBB,, | Performed by: OPHTHALMOLOGY

## 2017-12-04 PROCEDURE — 67028 INJECTION EYE DRUG: CPT | Mod: S$PBB,RT,, | Performed by: OPHTHALMOLOGY

## 2017-12-04 RX ADMIN — AFLIBERCEPT 2 MG: 40 INJECTION, SOLUTION INTRAVITREAL at 11:12

## 2017-12-04 NOTE — PROGRESS NOTES
HPI     Eye Problem    Additional comments: 9 week check           Comments   DLS 10/2/17- No changes x last visit. She still has trouble seeing small   letters      HPI     DLS 8/7/17- Dr. Sue            Pt  States having trouble reading certain print.                 OCT - OD  PED's with SRF  OS - SR Fibrosis - NO SRF      A/P    1. Wet AMD OS  S/p Avastin injections outside  Stable today with cicatrix  Observe    2. Wet AMD OD  S/p Avastin OD x 10  S/p Eylea OD x 6    EYlea OD    Alternate tx      AREDS/AG    3. DM  No significant DR  BS/BP/chol control    4. PCIOL OU      9-10 weeks OCT      Risks, benefits, and alternatives to treatment discussed in detail with the patient.  The patient voiced understanding and wished to proceed with the procedure    Injection Procedure Note:  Diagnosis: Wet AMD OD    Topical Proparacaine and Betadine.  Injec Eylea OD at 6:00 @ 3.5-4mm posterior to limbus  Post Operative Dx: Same  Complications: None  Follow up as above.

## 2017-12-04 NOTE — PATIENT INSTRUCTIONS

## 2018-01-05 ENCOUNTER — OFFICE VISIT (OUTPATIENT)
Dept: PODIATRY | Facility: CLINIC | Age: 78
End: 2018-01-05
Payer: MEDICARE

## 2018-01-05 VITALS — BODY MASS INDEX: 35.39 KG/M2 | HEIGHT: 63 IN | WEIGHT: 199.75 LBS

## 2018-01-05 DIAGNOSIS — B35.1 ONYCHOMYCOSIS DUE TO DERMATOPHYTE: ICD-10-CM

## 2018-01-05 DIAGNOSIS — M20.42 HAMMER TOES OF BOTH FEET: ICD-10-CM

## 2018-01-05 DIAGNOSIS — M20.41 HAMMER TOES OF BOTH FEET: ICD-10-CM

## 2018-01-05 DIAGNOSIS — E11.49 TYPE II DIABETES MELLITUS WITH NEUROLOGICAL MANIFESTATIONS: Primary | ICD-10-CM

## 2018-01-05 DIAGNOSIS — L84 CORN OR CALLUS: ICD-10-CM

## 2018-01-05 PROCEDURE — 11056 PARNG/CUTG B9 HYPRKR LES 2-4: CPT | Mod: S$PBB,Q9,, | Performed by: PODIATRIST

## 2018-01-05 PROCEDURE — 11721 DEBRIDE NAIL 6 OR MORE: CPT | Mod: S$PBB,59,Q9, | Performed by: PODIATRIST

## 2018-01-05 PROCEDURE — 99999 PR PBB SHADOW E&M-EST. PATIENT-LVL III: CPT | Mod: PBBFAC,,, | Performed by: PODIATRIST

## 2018-01-05 PROCEDURE — 99213 OFFICE O/P EST LOW 20 MIN: CPT | Mod: 25,S$PBB,, | Performed by: PODIATRIST

## 2018-01-05 PROCEDURE — 11721 DEBRIDE NAIL 6 OR MORE: CPT | Mod: PBBFAC,59,PN | Performed by: PODIATRIST

## 2018-01-05 PROCEDURE — 11056 PARNG/CUTG B9 HYPRKR LES 2-4: CPT | Mod: PBBFAC,PN | Performed by: PODIATRIST

## 2018-01-05 PROCEDURE — 99213 OFFICE O/P EST LOW 20 MIN: CPT | Mod: PBBFAC,PN | Performed by: PODIATRIST

## 2018-01-06 NOTE — PROGRESS NOTES
Subjective:      Patient ID: Rebeca Turner is a 77 y.o. female.    Chief Complaint: Diabetes Mellitus (graeme 12/20/17 a1c 11/12/16 6.9) and Diabetic Foot Exam    Rebeca is a 77 y.o. female who presents to the clinic for routine evaluation and treatment of diabetic feet. Rebeca has a past medical history of Anemia of chronic disease; CKD (chronic kidney disease) stage 3, GFR 30-59 ml/min; DM type 2 (diabetes mellitus, type 2); DVT (deep venous thrombosis); Dysplastic nevi; Fatty liver; GERD (gastroesophageal reflux disease); H/O esophagogastroduodenoscopy; History of endometrial cancer; HTN (hypertension); Hyperlipidemia; LVE (left ventricular enlargement); Macular degeneration; MR (mental retardation); Obesity; LUISANA (obstructive sleep apnea); Osteoporosis; S/P colonoscopy; SBO (small bowel obstruction); Seizure disorder; and Vitreous detachment. Patient relates no major problem with feet. Only complaints today consist of toenails and calluses in need of trimming.  Denies being painful with wearing shoe gear.  Has not attempted to treat on her own.  Denies any additional pedal complaints.      PCP: Dr. Prado  Date Last Seen by PCP:12/20/17      Hemoglobin A1C   Date Value Ref Range Status   11/02/2016 6.9 (H) 4.5 - 6.2 % Final     Comment:     According to ADA guidelines, hemoglobin A1C <7.0% represents  optimal control in non-pregnant diabetic patients.  Different  metrics may apply to specific populations.   Standards of Medical Care in Diabetes - 2016.  For the purpose of screening for the presence of diabetes:  <5.7%     Consistent with the absence of diabetes  5.7-6.4%  Consistent with increasing risk for diabetes   (prediabetes)  >or=6.5%  Consistent with diabetes  Currently no consensus exists for use of hemoglobin A1C  for diagnosis of diabetes for children.     08/23/2016 6.2 4.5 - 6.2 % Final     Comment:     According to ADA guidelines, hemoglobin A1C <7.0% represents  optimal control in non-pregnant  diabetic patients.  Different  metrics may apply to specific populations.   Standards of Medical Care in Diabetes - 2016.  For the purpose of screening for the presence of diabetes:  <5.7%     Consistent with the absence of diabetes  5.7-6.4%  Consistent with increasing risk for diabetes   (prediabetes)  >or=6.5%  Consistent with diabetes  Currently no consensus exists for use of hemoglobin A1C  for diagnosis of diabetes for children.     05/31/2016 6.6 (H) 0.0 - 5.6 % Final     Comment:     Reference Interval:  5.0 - 5.6 Normal   5.7 - 6.4 High Risk   > 6.5 Diabetic    Hgb A1c results are standardized based on the (NGSP) National   Glycohemoglobin Standardization Program.    Hemoglobin A1C levels are related to mean serum/plasma glucose   during the preceding 2-3 months.                Past Medical History:   Diagnosis Date    Anemia of chronic disease     hematology su neg    CKD (chronic kidney disease) stage 3, GFR 30-59 ml/min     DM type 2 (diabetes mellitus, type 2)     DVT (deep venous thrombosis)     x 2 both post-op with last 6/16    Dysplastic nevi     h/o    Fatty liver     noted on usg 9/15    GERD (gastroesophageal reflux disease)     H/O esophagogastroduodenoscopy     normal 11/10    History of endometrial cancer     stage 1;  s/p SENDY with BSO 10/06    HTN (hypertension)     Hyperlipidemia     LVE (left ventricular enlargement)     noted on 6/15 ECHO    Macular degeneration     exudative    MR (mental retardation)     Obesity     LUISANA (obstructive sleep apnea)     su in progress    Osteoporosis     osteopenia noted on 2/13 and 7/15 dexa    S/P colonoscopy     last 10/15;  next 10/19    SBO (small bowel obstruction)     h/o recurrent SBO;  s/p R hemicolectomy 12/10 and s/p incarcerated hernia repair with extensive scar tissue 6/16    Seizure disorder     Vitreous detachment        Past Surgical History:   Procedure Laterality Date    ABDOMINAL SURGERY  06/2016    Incarcerated  incisional hernia, enterotomy x3, and extensive adhesions      CATARACT EXTRACTION      B 6/11    CHOLECYSTECTOMY      COLON SURGERY  12/6/2010  ([place], [place]    R hemicolectomy 12/10 due to recurrent SBO from benign colon mass 12/2010.   Benign albeit large polyp.    COLONOSCOPY N/A 10/7/2015    Procedure: COLONOSCOPY;  Surgeon: Will Cr Jr., MD;  Location: [facility];  Service: Endoscopy;  Laterality: N/A;    COLONOSCOPY W/ POLYPECTOMY  11/22/2010  [place], [place].    3 cm growth mid ascending colon.  TUBULOVILLOUS ADENOMA. 9 mm polyp mid transverse colon.  TUBULAR ADENOMA.  10 mm polyp distal descending colon.  TUBULOVILLOUS ADENOMA.     ESOPHAGOGASTRODUODENOSCOPY  11/16/2010    Normal EGD.    HERNIA REPAIR  8/2015    from incarcerated hernia    HYSTERECTOMY  10/17/2006    SENDY and BSO 10/06 for stage 1 endometrial cancer    MYRINGOTOMY W/ TUBES      bilateral    TONSILLECTOMY         Family History   Problem Relation Age of Onset    Cancer Sister      details unknown    Heart attack Father     Pulmonary embolism Mother        Social History     Social History    Marital status: Single     Spouse name: N/A    Number of children: N/A    Years of education: N/A     Social History Main Topics    Smoking status: Never Smoker    Smokeless tobacco: Never Used    Alcohol use No    Drug use: No    Sexual activity: No     Other Topics Concern    None     Social History Narrative    Lives at [facility].       Current Outpatient Prescriptions   Medication Sig Dispense Refill    acetaminophen (TYLENOL EXTRA STRENGTH) 500 MG tablet Take 500 mg by mouth every 6 (six) hours as needed for Pain or Temperature greater than.       alendronate (FOSAMAX) 70 MG tablet TAKE ONE TABLET WEEKLY AS DIRECTED ON THURSDAY 4 tablet 0    calcium carbonate-vitamin D3 (CALCIUM 600 + D,3,) 600 mg calcium- 200 unit Cap Take 1 capsule by mouth 2 (two) times daily.      CRESTOR 10 mg tablet TAKE ONE TABLET BY MOUTH EVERY NIGHT  AT BEDTIME 30 tablet 0    diltiaZEM (CARDIZEM CD) 360 MG 24 hr capsule       dimethicone-kelvin-A-C-E-aloe (GOLD BOND ULTIMATE DIABETICS') 3-30 % Crea Apply topically 2 (two) times daily. APPLY TO FEET      docusate sodium (COLACE) 100 MG capsule Take 100 mg by mouth 2 (two) times daily.      fish oil-omega-3 fatty acids 300-1,000 mg capsule Take 2 g by mouth once daily.      hydrALAZINE (APRESOLINE) 50 MG tablet Take 50 mg by mouth every 12 (twelve) hours.      labetalol (NORMODYNE) 300 MG tablet TAKE ONE TABLET BY MOUTH TWICE DAILY FOR BLOOD PRESSURE 60 tablet 0    LANTUS SOLOSTAR 100 unit/mL (3 mL) InPn pen Inject 35 Units into the skin every evening.       lisinopril (PRINIVIL,ZESTRIL) 20 MG tablet       lutein 20 mg Cap TAKE ONE SOFTGEL BY MOUTH EVERY MORNING 30 each 0    polyethylene glycol (GLYCOLAX) 17 gram/dose powder       TEGRETOL  mg 12 hr tablet TAKE ONE TABLET BY MOUTH ONCE DAILY SEIZURES (Patient taking differently: one tablet bid) 30 tablet 0    triamterene-hydrochlorothiazide 37.5-25 mg (MAXZIDE-25) 37.5-25 mg per tablet Take 1 tablet by mouth once daily.       No current facility-administered medications for this visit.        Review of patient's allergies indicates:  No Known Allergies      Review of Systems   Constitution: Negative for chills and fever.   Cardiovascular: Negative for claudication and leg swelling.   Skin: Positive for color change and nail changes.   Musculoskeletal: Negative for arthritis, joint pain and joint swelling.   Gastrointestinal: Negative for nausea and vomiting.   Neurological: Positive for paresthesias.   Psychiatric/Behavioral: Negative for altered mental status.           Objective:      Physical Exam   Constitutional: She is oriented to person, place, and time. She appears well-developed and well-nourished. No distress.   Cardiovascular:   Pulses:       Dorsalis pedis pulses are 2+ on the right side, and 2+ on the left side.        Posterior tibial  pulses are 1+ on the right side, and 1+ on the left side.   CFT <3 seconds bilateral.  Pedal hair growth decreased bilateral.  Varicosities noted to bilateral lower extremity.  Mild nonpitting edema noted to bilateral lower extremity.  Toes are cool to touch bilateral.     Musculoskeletal: She exhibits edema. She exhibits no tenderness.   Muscle strength 5/5 in all muscle groups bilateral.  No tenderness nor crepitation with ROM of foot/ankle joints bilateral.  No tenderness with palpation of bilateral foot and ankle.  Bilateral pes planus foot type.     Neurological: She is alert and oriented to person, place, and time. She has normal strength. A sensory deficit is present.   Protective sensation per [place]-Julianne monofilament decreased bilateral.    Vibratory sensation decreased bilateral.    Light touch intact bilateral.   Skin: Skin is warm, dry and intact. Capillary refill takes less than 2 seconds. Lesion noted. No abrasion, no bruising, no burn, no ecchymosis, no laceration, no petechiae and no rash noted. She is not diaphoretic. No cyanosis or erythema. No pallor. Nails show no clubbing.   Pedal skin appears thin and atrophic bilateral.  Toenails x 10 appear thickened by 2 mm's, elongated by 2 mm's, and discolored with subungual debris.  Hyperkeratotic lesion noted to the distal tip of the Rt. 2nd toe and to bilateral 1st plantar mtp joint.  Multiple nevi noted to the dorsum of the Lt. Foot. No break in the adjacent skin integrity.              Assessment:       Encounter Diagnoses   Name Primary?    Type II diabetes mellitus with neurological manifestations Yes    Onychomycosis due to dermatophyte     Corn or callus     Hammer toes of both feet          Plan:       Rebeca was seen today for diabetes mellitus and diabetic foot exam.    Diagnoses and all orders for this visit:    Type II diabetes mellitus with neurological manifestations    Onychomycosis due to dermatophyte    Corn or callus    Hammer  toes of both feet      I counseled the patient on her conditions, their implications and medical management.    Advised to continue wearing the crest pad to offload the Rt. 2nd toe.    Advised to continue wearing shoes that accommodate digital deformities.    Shoe inspection. Diabetic Foot Education. Patient reminded of the importance of good nutrition and blood sugar control to help prevent podiatric complications of diabetes. Patient instructed on proper foot hygeine. We discussed wearing proper shoe gear, daily foot inspections, never walking without protective shoe gear, never putting sharp instruments to feet    With patient's permission, nails were aggressively reduced and debrided x 10 to their soft tissue attachment mechanically and with electric , removing all offending nail and debris.  Also, a sterile #15 scalpel was used to trim lesions x 3 down to smooth appearing skin without incident.  Patient relates relief following the procedure. She will continue to monitor the areas daily, inspect her feet, wear protective shoe gear when ambulatory, moisturizer to maintain skin integrity and follow in this office in approximately 2-3 months, sooner p.r.n.      Return in about 3 months (around 4/5/2018).    Sae Snowden DPM

## 2018-01-18 ENCOUNTER — LAB VISIT (OUTPATIENT)
Dept: LAB | Facility: HOSPITAL | Age: 78
End: 2018-01-18
Attending: INTERNAL MEDICINE
Payer: MEDICARE

## 2018-01-18 DIAGNOSIS — E78.2 MIXED HYPERLIPIDEMIA: ICD-10-CM

## 2018-01-18 DIAGNOSIS — N39.0 UTI (URINARY TRACT INFECTION): ICD-10-CM

## 2018-01-18 DIAGNOSIS — I10 ESSENTIAL HYPERTENSION, MALIGNANT: Primary | ICD-10-CM

## 2018-01-18 DIAGNOSIS — I51.9 MYXEDEMA HEART DISEASE: ICD-10-CM

## 2018-01-18 DIAGNOSIS — E11.00 DM HYPEROSMOLARITY TYPE II: ICD-10-CM

## 2018-01-18 DIAGNOSIS — E03.9 MYXEDEMA HEART DISEASE: ICD-10-CM

## 2018-01-18 DIAGNOSIS — D52.1 DRUG-INDUCED FOLATE DEFICIENCY ANEMIA: ICD-10-CM

## 2018-01-18 LAB
ALBUMIN SERPL BCP-MCNC: 3.5 G/DL
ALP SERPL-CCNC: 170 U/L
ALT SERPL W/O P-5'-P-CCNC: 21 U/L
ANION GAP SERPL CALC-SCNC: 8 MMOL/L
AST SERPL-CCNC: 16 U/L
BACTERIA #/AREA URNS HPF: NORMAL /HPF
BASOPHILS # BLD AUTO: 0.03 K/UL
BASOPHILS NFR BLD: 0.5 %
BILIRUB SERPL-MCNC: 0.2 MG/DL
BILIRUB UR QL STRIP: NEGATIVE
BUN SERPL-MCNC: 47 MG/DL
CALCIUM SERPL-MCNC: 10.6 MG/DL
CHLORIDE SERPL-SCNC: 104 MMOL/L
CLARITY UR: CLEAR
CO2 SERPL-SCNC: 28 MMOL/L
COLOR UR: YELLOW
CREAT SERPL-MCNC: 1.4 MG/DL
DIFFERENTIAL METHOD: ABNORMAL
EOSINOPHIL # BLD AUTO: 0.1 K/UL
EOSINOPHIL NFR BLD: 2.2 %
ERYTHROCYTE [DISTWIDTH] IN BLOOD BY AUTOMATED COUNT: 13.1 %
EST. GFR  (AFRICAN AMERICAN): 42 ML/MIN/1.73 M^2
EST. GFR  (NON AFRICAN AMERICAN): 36 ML/MIN/1.73 M^2
GLUCOSE SERPL-MCNC: 121 MG/DL
GLUCOSE UR QL STRIP: NEGATIVE
HCT VFR BLD AUTO: 30.4 %
HGB BLD-MCNC: 9.7 G/DL
HGB UR QL STRIP: NEGATIVE
IMM GRANULOCYTES # BLD AUTO: 0.02 K/UL
IMM GRANULOCYTES NFR BLD AUTO: 0.3 %
KETONES UR QL STRIP: NEGATIVE
LEUKOCYTE ESTERASE UR QL STRIP: ABNORMAL
LYMPHOCYTES # BLD AUTO: 1.1 K/UL
LYMPHOCYTES NFR BLD: 16.6 %
MCH RBC QN AUTO: 30.2 PG
MCHC RBC AUTO-ENTMCNC: 31.9 G/DL
MCV RBC AUTO: 95 FL
MICROSCOPIC COMMENT: NORMAL
MONOCYTES # BLD AUTO: 0.5 K/UL
MONOCYTES NFR BLD: 8.4 %
NEUTROPHILS # BLD AUTO: 4.6 K/UL
NEUTROPHILS NFR BLD: 72 %
NITRITE UR QL STRIP: NEGATIVE
NRBC BLD-RTO: 0 /100 WBC
PH UR STRIP: 6 [PH] (ref 5–8)
PLATELET # BLD AUTO: 261 K/UL
PMV BLD AUTO: 10.3 FL
POTASSIUM SERPL-SCNC: 5.6 MMOL/L
PROT SERPL-MCNC: 7.3 G/DL
PROT UR QL STRIP: NEGATIVE
RBC # BLD AUTO: 3.21 M/UL
RBC #/AREA URNS HPF: 1 /HPF (ref 0–4)
SODIUM SERPL-SCNC: 140 MMOL/L
SP GR UR STRIP: 1.01 (ref 1–1.03)
SQUAMOUS #/AREA URNS HPF: 5 /HPF
URN SPEC COLLECT METH UR: ABNORMAL
WBC # BLD AUTO: 6.34 K/UL
WBC #/AREA URNS HPF: 3 /HPF (ref 0–5)

## 2018-01-18 PROCEDURE — 84443 ASSAY THYROID STIM HORMONE: CPT

## 2018-01-18 PROCEDURE — 87077 CULTURE AEROBIC IDENTIFY: CPT

## 2018-01-18 PROCEDURE — 85025 COMPLETE CBC W/AUTO DIFF WBC: CPT

## 2018-01-18 PROCEDURE — 87186 SC STD MICRODIL/AGAR DIL: CPT

## 2018-01-18 PROCEDURE — 83036 HEMOGLOBIN GLYCOSYLATED A1C: CPT

## 2018-01-18 PROCEDURE — 80061 LIPID PANEL: CPT

## 2018-01-18 PROCEDURE — 87088 URINE BACTERIA CULTURE: CPT

## 2018-01-18 PROCEDURE — 80053 COMPREHEN METABOLIC PANEL: CPT | Mod: PO

## 2018-01-18 PROCEDURE — 87086 URINE CULTURE/COLONY COUNT: CPT

## 2018-01-18 PROCEDURE — 81000 URINALYSIS NONAUTO W/SCOPE: CPT | Mod: PO

## 2018-01-18 PROCEDURE — 36415 COLL VENOUS BLD VENIPUNCTURE: CPT | Mod: PO

## 2018-01-19 ENCOUNTER — TELEPHONE (OUTPATIENT)
Dept: OPHTHALMOLOGY | Facility: CLINIC | Age: 78
End: 2018-01-19

## 2018-01-19 LAB
CHOLEST SERPL-MCNC: 136 MG/DL
CHOLEST/HDLC SERPL: 4 {RATIO}
ESTIMATED AVG GLUCOSE: 140 MG/DL
HBA1C MFR BLD HPLC: 6.5 %
HDLC SERPL-MCNC: 34 MG/DL
HDLC SERPL: 25 %
LDLC SERPL CALC-MCNC: 56.6 MG/DL
NONHDLC SERPL-MCNC: 102 MG/DL
TRIGL SERPL-MCNC: 227 MG/DL
TSH SERPL DL<=0.005 MIU/L-ACNC: 3.3 UIU/ML

## 2018-01-20 LAB — BACTERIA UR CULT: NORMAL

## 2018-01-23 ENCOUNTER — OFFICE VISIT (OUTPATIENT)
Dept: CARDIOLOGY | Facility: CLINIC | Age: 78
End: 2018-01-23
Payer: MEDICARE

## 2018-01-23 VITALS
BODY MASS INDEX: 34.38 KG/M2 | HEIGHT: 63 IN | SYSTOLIC BLOOD PRESSURE: 139 MMHG | HEART RATE: 68 BPM | DIASTOLIC BLOOD PRESSURE: 63 MMHG | WEIGHT: 194 LBS

## 2018-01-23 DIAGNOSIS — I10 ESSENTIAL HYPERTENSION: ICD-10-CM

## 2018-01-23 DIAGNOSIS — F03.90 DEMENTIA WITHOUT BEHAVIORAL DISTURBANCE, UNSPECIFIED DEMENTIA TYPE: ICD-10-CM

## 2018-01-23 DIAGNOSIS — I44.1 AV BLOCK, 2ND DEGREE: ICD-10-CM

## 2018-01-23 PROCEDURE — 99214 OFFICE O/P EST MOD 30 MIN: CPT | Mod: S$PBB,,, | Performed by: INTERNAL MEDICINE

## 2018-01-23 PROCEDURE — 99213 OFFICE O/P EST LOW 20 MIN: CPT | Mod: PBBFAC,PO | Performed by: INTERNAL MEDICINE

## 2018-01-23 PROCEDURE — 99999 PR PBB SHADOW E&M-EST. PATIENT-LVL III: CPT | Mod: PBBFAC,,, | Performed by: INTERNAL MEDICINE

## 2018-01-23 NOTE — PROGRESS NOTES
Subjective:    Patient ID:  Rebeca Turner is a 77 y.o. female who presents for follow-up of second degree heart block (follow up)      Pt with severe dementia from a NH seen in 2016 after Pt was noted to have 2* AVB during a sleep study. No rhythm strips were available. She has no cardiac history and reported no symptoms. She denied any palpitations, dizziness, syncope or pre-syncope. She denied rapid, slow or sustained rhythms. She denied any chest pain, SOB, PND, orthopnea. We ordered a 48 hr Holter and repeat Echo which never got done. Her attendant from the NH reports no symptoms or any change in status since last visit.         Review of Systems   Unable to perform ROS: dementia        Objective:    Physical Exam   Constitutional: She appears well-developed and well-nourished.   Eyes: Pupils are equal, round, and reactive to light.   Neck: Normal range of motion. No thyromegaly present.   Cardiovascular: Normal rate, regular rhythm, S1 normal, S2 normal, normal heart sounds, intact distal pulses and normal pulses.   No extrasystoles are present. PMI is not displaced.  Exam reveals no friction rub.    No murmur heard.  Pulmonary/Chest: Effort normal and breath sounds normal. She has no wheezes. She has no rales. She exhibits no tenderness.   Abdominal: Soft. Bowel sounds are normal. She exhibits no distension and no mass. There is no tenderness.   Musculoskeletal: Normal range of motion. She exhibits no edema.   Neurological: She is alert.   Skin: Skin is warm and dry.   Vitals reviewed.        Assessment:       1. Essential hypertension    2. AV block, 2nd degree    3. Dementia without behavioral disturbance, unspecified dementia type         Plan:       Reorder Echo and Holter  Pt is asymptomatic w/o any clinical evidence of high grade AV block  If Echo and Holter ok, would not pursue further w/u

## 2018-02-02 ENCOUNTER — CLINICAL SUPPORT (OUTPATIENT)
Dept: CARDIOLOGY | Facility: CLINIC | Age: 78
End: 2018-02-02
Attending: INTERNAL MEDICINE
Payer: MEDICARE

## 2018-02-02 DIAGNOSIS — I10 ESSENTIAL HYPERTENSION: ICD-10-CM

## 2018-02-02 DIAGNOSIS — I44.1 AV BLOCK, 2ND DEGREE: ICD-10-CM

## 2018-02-02 PROCEDURE — 93227 XTRNL ECG REC<48 HR R&I: CPT | Mod: S$PBB,,, | Performed by: INTERNAL MEDICINE

## 2018-02-02 PROCEDURE — 93306 TTE W/DOPPLER COMPLETE: CPT | Mod: PBBFAC,PO | Performed by: INTERNAL MEDICINE

## 2018-02-02 PROCEDURE — 93226 XTRNL ECG REC<48 HR SCAN A/R: CPT | Mod: PBBFAC,PO | Performed by: INTERNAL MEDICINE

## 2018-02-05 LAB
DIASTOLIC DYSFUNCTION: YES
ESTIMATED PA SYSTOLIC PRESSURE: 53.69
MITRAL VALVE REGURGITATION: ABNORMAL
RETIRED EF AND QEF - SEE NOTES: 60 (ref 55–65)
TRICUSPID VALVE REGURGITATION: ABNORMAL

## 2018-02-07 ENCOUNTER — TELEPHONE (OUTPATIENT)
Dept: CARDIOLOGY | Facility: CLINIC | Age: 78
End: 2018-02-07

## 2018-02-07 NOTE — TELEPHONE ENCOUNTER
Test(s) Reviewed  I have reviewed the following in detail:  [] Stress test   [] Angiography   [x] Echocardiogram   [] Labs   [x] Other:  Holter     Call Nursing home with results  Echo not significantly changed from 2015  Holter showed same Wenckebach AV block during sleep as seen during sleep study several years ago.  This is a fairly stable rhythm and as she demonstrates no worrisome symptoms and no rhythm issues while awake, no specific treatment needed at this time.

## 2018-02-15 ENCOUNTER — TELEPHONE (OUTPATIENT)
Dept: HEMATOLOGY/ONCOLOGY | Facility: CLINIC | Age: 78
End: 2018-02-15

## 2018-02-15 NOTE — TELEPHONE ENCOUNTER
Returned Jennifer's call and scheduled pt a lab appt and Andrea Ellis NP appt.  Jennifer verbalizes understanding.

## 2018-02-23 ENCOUNTER — OFFICE VISIT (OUTPATIENT)
Dept: HEMATOLOGY/ONCOLOGY | Facility: CLINIC | Age: 78
End: 2018-02-23
Payer: MEDICARE

## 2018-02-23 ENCOUNTER — LAB VISIT (OUTPATIENT)
Dept: LAB | Facility: HOSPITAL | Age: 78
End: 2018-02-23
Attending: NURSE PRACTITIONER
Payer: MEDICARE

## 2018-02-23 VITALS
DIASTOLIC BLOOD PRESSURE: 52 MMHG | SYSTOLIC BLOOD PRESSURE: 156 MMHG | BODY MASS INDEX: 34.1 KG/M2 | WEIGHT: 192.44 LBS | HEIGHT: 63 IN | HEART RATE: 60 BPM | RESPIRATION RATE: 19 BRPM | TEMPERATURE: 98 F

## 2018-02-23 DIAGNOSIS — D50.9 IRON DEFICIENCY ANEMIA, UNSPECIFIED IRON DEFICIENCY ANEMIA TYPE: ICD-10-CM

## 2018-02-23 DIAGNOSIS — D50.9 IRON DEFICIENCY ANEMIA, UNSPECIFIED IRON DEFICIENCY ANEMIA TYPE: Primary | ICD-10-CM

## 2018-02-23 LAB
ANION GAP SERPL CALC-SCNC: 12 MMOL/L
BASOPHILS # BLD AUTO: 0.03 K/UL
BASOPHILS NFR BLD: 0.4 %
BUN SERPL-MCNC: 37 MG/DL
CALCIUM SERPL-MCNC: 9.8 MG/DL
CHLORIDE SERPL-SCNC: 101 MMOL/L
CO2 SERPL-SCNC: 30 MMOL/L
CREAT SERPL-MCNC: 1.03 MG/DL
DIFFERENTIAL METHOD: ABNORMAL
EOSINOPHIL # BLD AUTO: 0.1 K/UL
EOSINOPHIL NFR BLD: 1.6 %
ERYTHROCYTE [DISTWIDTH] IN BLOOD BY AUTOMATED COUNT: 13.2 %
EST. GFR  (AFRICAN AMERICAN): >60 ML/MIN/1.73 M^2
EST. GFR  (NON AFRICAN AMERICAN): 53 ML/MIN/1.73 M^2
GLUCOSE SERPL-MCNC: 190 MG/DL
HCT VFR BLD AUTO: 28.7 %
HGB BLD-MCNC: 9 G/DL
LYMPHOCYTES # BLD AUTO: 1.1 K/UL
LYMPHOCYTES NFR BLD: 15.3 %
MCH RBC QN AUTO: 30.2 PG
MCHC RBC AUTO-ENTMCNC: 31.4 G/DL
MCV RBC AUTO: 96 FL
MONOCYTES # BLD AUTO: 0.5 K/UL
MONOCYTES NFR BLD: 7.1 %
NEUTROPHILS # BLD AUTO: 5.2 K/UL
NEUTROPHILS NFR BLD: 75.6 %
NRBC BLD-RTO: 0 /100 WBC
PLATELET # BLD AUTO: 246 K/UL
PMV BLD AUTO: 9.9 FL
POTASSIUM SERPL-SCNC: 4 MMOL/L
RBC # BLD AUTO: 2.98 M/UL
SODIUM SERPL-SCNC: 143 MMOL/L
WBC # BLD AUTO: 6.87 K/UL

## 2018-02-23 PROCEDURE — 99214 OFFICE O/P EST MOD 30 MIN: CPT | Mod: PBBFAC,PN | Performed by: NURSE PRACTITIONER

## 2018-02-23 PROCEDURE — 1159F MED LIST DOCD IN RCRD: CPT | Mod: ,,, | Performed by: NURSE PRACTITIONER

## 2018-02-23 PROCEDURE — 99999 PR PBB SHADOW E&M-EST. PATIENT-LVL IV: CPT | Mod: PBBFAC,,, | Performed by: NURSE PRACTITIONER

## 2018-02-23 PROCEDURE — 99213 OFFICE O/P EST LOW 20 MIN: CPT | Mod: S$PBB,,, | Performed by: NURSE PRACTITIONER

## 2018-02-23 PROCEDURE — 85025 COMPLETE CBC W/AUTO DIFF WBC: CPT

## 2018-02-23 PROCEDURE — 85025 COMPLETE CBC W/AUTO DIFF WBC: CPT | Mod: PN

## 2018-02-23 PROCEDURE — 1126F AMNT PAIN NOTED NONE PRSNT: CPT | Mod: ,,, | Performed by: NURSE PRACTITIONER

## 2018-02-23 PROCEDURE — 80048 BASIC METABOLIC PNL TOTAL CA: CPT | Mod: PN

## 2018-02-23 PROCEDURE — 36415 COLL VENOUS BLD VENIPUNCTURE: CPT | Mod: PN

## 2018-02-23 PROCEDURE — 80048 BASIC METABOLIC PNL TOTAL CA: CPT

## 2018-02-23 RX ORDER — INSULIN DETEMIR 100 [IU]/ML
INJECTION, SOLUTION SUBCUTANEOUS
COMMUNITY
Start: 2018-02-21 | End: 2018-02-23

## 2018-02-23 RX ORDER — HYDROCHLOROTHIAZIDE 25 MG/1
25 TABLET ORAL DAILY
COMMUNITY
Start: 2018-02-06

## 2018-02-23 RX ORDER — IRON,CARBONYL/ASCORBIC ACID 100-250 MG
TABLET ORAL
Qty: 90 TABLET | Refills: 0 | Status: SHIPPED | OUTPATIENT
Start: 2018-02-23 | End: 2018-05-22 | Stop reason: SDUPTHER

## 2018-02-23 RX ORDER — CIPROFLOXACIN HYDROCHLORIDE 3 MG/ML
SOLUTION/ DROPS OPHTHALMIC
COMMUNITY
Start: 2018-02-15 | End: 2018-02-23

## 2018-02-23 NOTE — PROGRESS NOTES
HISTORY OF PRESENT ILLNESS:  This is a 77-year-old white female known to Dr. Voss for a diagnosis regarding hypochromic normocytic anemia in association   with elevated alkaline phosphatase levels.  She presents to the clinic today, late to follow,   with her sitter as she resides in a group home in [facility].  She denies any fatigue, weakness,   headaches, blurred vision, melena, pica, abdominal discomfort, nausea, vomiting,   constipation, diarrhea, fevers, chills, etc.  No other new complaints or pertinent findings   on a 14-point review of systems.    PHYSICAL EXAMINATION:  GENERAL:  Well-developed, well-nourished elderly white female, mentally   challenged, in no acute distress.  Alert and oriented to person and place.  VITAL SIGNS:    Wt Readings from Last 3 Encounters:   02/23/18 87.3 kg (192 lb 7.4 oz)   01/23/18 88 kg (194 lb 0.1 oz)   01/05/18 90.6 kg (199 lb 11.8 oz)     Temp Readings from Last 3 Encounters:   02/23/18 98.2 °F (36.8 °C) (Oral)   05/11/17 97.8 °F (36.6 °C)   11/11/16 98.2 °F (36.8 °C)     BP Readings from Last 3 Encounters:   02/23/18 (!) 156/52   01/23/18 139/63   10/02/17 (!) 165/60     Pulse Readings from Last 3 Encounters:   02/23/18 60   01/23/18 68   10/02/17 74   HEENT:  Normocephalic, atraumatic.  Oral mucosa pink and moist.  Lips without   lesions.  Tongue midline.  Oropharynx clear.  Nonicteric sclerae.   NECK:  Supple, no adenopathy.  No carotid bruits, thyromegaly or thyroid nodule.  HEART:  Regular rate and rhythm without murmur, gallop or rub.   LUNGS:  Clear to auscultation bilaterally.  Normal respiratory effort.   ABDOMEN:  Soft, nontender, nondistended with positive normoactive bowel sounds,   no hepatosplenomegaly.  EXTREMITIES:  No cyanosis, clubbing or edema.  Distal pulses are intact.     LABORATORY:   Lab Results   Component Value Date    WBC 6.87 02/23/2018    HGB 9.0 (L) 02/23/2018    HCT 28.7 (L) 02/23/2018    MCV 96 02/23/2018     02/23/2018     75.6%  grans, 15.3% lymph  Hemoglobin declining from 9.7 to 9.0    BMP  Lab Results   Component Value Date     02/23/2018    K 4.0 02/23/2018     02/23/2018    CO2 30 02/23/2018    BUN 37 (H) 02/23/2018    CREATININE 1.03 02/23/2018    CALCIUM 9.8 02/23/2018    ANIONGAP 12 02/23/2018    ESTGFRAFRICA >60 02/23/2018    EGFRNONAA 53 (A) 02/23/2018     IMPRESSION:  1.  Anemia of chronic disease, stable  2.  CKD III.  3.  Fatty infiltration of the liver.    PLAN:    1.  Restart ICAR-C 100/250 1 tablet daily; Rx escribed to [facility]; orders placed on Thor sheet.  2.  Return to clinic in 3 months with interval CBC, CMP, Iron studies, Ferritin, sTFR prior.    Assessment/plan reviewed and approved by Dr. Voss.

## 2018-03-05 ENCOUNTER — PROCEDURE VISIT (OUTPATIENT)
Dept: OPHTHALMOLOGY | Facility: CLINIC | Age: 78
End: 2018-03-05
Attending: OPHTHALMOLOGY
Payer: MEDICARE

## 2018-03-05 VITALS — SYSTOLIC BLOOD PRESSURE: 165 MMHG | DIASTOLIC BLOOD PRESSURE: 55 MMHG | HEART RATE: 58 BPM

## 2018-03-05 DIAGNOSIS — H35.3222 EXUDATIVE AGE-RELATED MACULAR DEGENERATION OF LEFT EYE WITH INACTIVE CHOROIDAL NEOVASCULARIZATION: ICD-10-CM

## 2018-03-05 DIAGNOSIS — H35.3211 EXUDATIVE AGE-RELATED MACULAR DEGENERATION OF RIGHT EYE WITH ACTIVE CHOROIDAL NEOVASCULARIZATION: Primary | ICD-10-CM

## 2018-03-05 DIAGNOSIS — H43.393 VITREOUS FLOATERS OF BOTH EYES: ICD-10-CM

## 2018-03-05 PROCEDURE — 67028 INJECTION EYE DRUG: CPT | Mod: PBBFAC,PO,RT | Performed by: OPHTHALMOLOGY

## 2018-03-05 PROCEDURE — 67028 INJECTION EYE DRUG: CPT | Mod: S$PBB,RT,, | Performed by: OPHTHALMOLOGY

## 2018-03-05 PROCEDURE — C9257 BEVACIZUMAB INJECTION: HCPCS | Mod: PBBFAC,JG,PO | Performed by: OPHTHALMOLOGY

## 2018-03-05 PROCEDURE — 92134 CPTRZ OPH DX IMG PST SGM RTA: CPT | Mod: PBBFAC,PO | Performed by: OPHTHALMOLOGY

## 2018-03-05 PROCEDURE — 92014 COMPRE OPH EXAM EST PT 1/>: CPT | Mod: 25,S$PBB,, | Performed by: OPHTHALMOLOGY

## 2018-03-05 RX ADMIN — BEVACIZUMAB 1.25 MG: 100 INJECTION, SOLUTION INTRAVENOUS at 11:03

## 2018-03-05 NOTE — PROGRESS NOTES
HPI     DLS 12/4/17-    HPI     Eye Problem    Additional comments: 9 week check           Comments   DLS 10/2/17- No changes x last visit. She still has trouble seeing small   letters      HPI     DLS 8/7/17- Dr. Sue            Pt  States having trouble reading certain print.                 OCT - OD  PED's with SRF  OS - SR Fibrosis - NO SRF      A/P    1. Wet AMD OS  S/p Avastin injections outside  Stable today with cicatrix  Observe    2. Wet AMD OD  S/p Avastin OD x 10  S/p Eylea OD x 7    Avastin OD    Alternate tx      AREDS/AG    3. DM  No significant DR  BS/BP/chol control    4. PCIOL OU      10-11 weeks OCT      Risks, benefits, and alternatives to treatment discussed in detail with the patient.  The patient voiced understanding and wished to proceed with the procedure    Injection Procedure Note:  Diagnosis: Wet AMD OD    Topical Proparacaine and Betadine.  Injec Avastin OD at 6:00 @ 3.5-4mm posterior to limbus  Post Operative Dx: Same  Complications: None  Follow up as above.

## 2018-03-05 NOTE — PATIENT INSTRUCTIONS

## 2018-04-03 ENCOUNTER — OFFICE VISIT (OUTPATIENT)
Dept: DERMATOLOGY | Facility: CLINIC | Age: 78
End: 2018-04-03
Payer: MEDICARE

## 2018-04-03 VITALS — WEIGHT: 192 LBS | BODY MASS INDEX: 34.02 KG/M2 | HEIGHT: 63 IN

## 2018-04-03 DIAGNOSIS — D22.9 MULTIPLE BENIGN NEVI: ICD-10-CM

## 2018-04-03 DIAGNOSIS — Z87.898 HISTORY OF ATYPICAL NEVUS: Primary | ICD-10-CM

## 2018-04-03 DIAGNOSIS — Z12.83 SKIN CANCER SCREENING: ICD-10-CM

## 2018-04-03 PROCEDURE — 99213 OFFICE O/P EST LOW 20 MIN: CPT | Mod: S$PBB,,, | Performed by: DERMATOLOGY

## 2018-04-03 PROCEDURE — 99212 OFFICE O/P EST SF 10 MIN: CPT | Mod: PBBFAC,PO | Performed by: DERMATOLOGY

## 2018-04-03 PROCEDURE — 99999 PR PBB SHADOW E&M-EST. PATIENT-LVL II: CPT | Mod: PBBFAC,,, | Performed by: DERMATOLOGY

## 2018-04-03 NOTE — PROGRESS NOTES
"  Subjective:       Patient ID:  Libertad Church is a 77 y.o. female who presents for   Chief Complaint   Patient presents with    Skin Check     Pt here for follow up skin check. Last seen 7/21//2015      FINAL PATHOLOGIC DIAGNOSIS   1. Skin, right back, shave biopsy:   - MELANOCYTIC NEVUS, COMPOUND TYPE WITH ARCHITECTURAL DISORDER AND MILD CYTOLOGIC   ATYPIA (JOSH'S NEVUS).   - MARGINS ARE NEGATIVE IN THE PLANES OF SECTION.   MICROSCOPIC DESCRIPTION: Sections show a circumscribed, symmetric compound nevus characterized by   nests of melanocytes with scattered single cells located predominantly along the dermoepidermal junction. There is   architectural disorder consisting of irregularity in the size, shape and distribution of the nests at the   dermal-epidermal junction and "bridging" between rete ridges. There is no significant cytologic atypia or pagetoid   growth. Nests and cords of melanocytes are present within the papillary dermis and show maturation with dermal   descent. Papillary dermal fibrosis, a mild lymphocytic infiltrate and melanophages are noted.   2. Skin, left abdomen, shave biopsy:   - MELANOCYTIC NEVUS, COMPOUND TYPE WITH ARCHITECTURAL DISORDER AND MILD CYTOLOGIC   ATYPIA (JOSH'S NEVUS).   - MARGINS ARE NEGATIVE IN THE PLANES OF SECTION.   MICROSCOPIC DESCRIPTION: Sections show a circumscribed, symmetric compound nevus characterized by   nests of melanocytes with scattered single cells located predominantly along the dermoepidermal junction. There is   architectural disorder consisting of irregularity in the size, shape and distribution of the nests at the   dermal-epidermal junction and "bridging" between rete ridges. There is no significant cytologic atypia or pagetoid   growth. Nests and cords of melanocytes are present within the papillary dermis and show maturation with dermal   Report continued on next page Page 1 of 2   Patient Name LIBERTAD CHURCH Accession # [id number]   (76Y F) " "  Location   Date of Birth   [id number]   1940   Billing #   Collection Date   Received   Reported   Medical Record #   07/21/2016 07/21/2016 07/25/2016   [facility]   [id number]   descent. Papillary dermal fibrosis, a mild lymphocytic infiltrate and melanophages are noted.   3. Skin, left buttock, shave biopsy:   - MELANOCYTIC NEVUS, COMPOUND TYPE WITH ARCHITECTURAL DISORDER AND MILD CYTOLOGIC   ATYPIA (JOSH'S NEVUS).   - MARGINS ARE NEGATIVE IN THE PLANES OF SECTION.   MICROSCOPIC DESCRIPTION: Sections show a circumscribed, symmetric compound nevus characterized by   nests of melanocytes with scattered single cells located predominantly along the dermoepidermal junction. There is   architectural disorder consisting of irregularity in the size, shape and distribution of the nests at the   dermal-epidermal junction and "bridging" between rete ridges. There is no significant cytologic atypia or pagetoid   growth. Nests and cords of melanocytes are present within the papillary dermis and show maturation with dermal   descent. Papillary dermal fibrosis, a mild lymphocytic infiltrate and melanophages are noted.   Diagnosed by: Ignacio Shah M.D.   (Electronically Signed: 2016-07-25 13:33:12)   Gross Description   Part 1   ID/AP label: [id number]   The specimen is received in formalin, labeled "right back," and consists of a 0.7 x 0.6 cm tan, wrinkled, irregular   skin shave. There is a centrally located 0.4 x 0.3 cm brown, ill-defined macule. The specimen is bisected and   submitted entirely in cassette 1.   Part 2   ID/AP label: [id number]   The specimen is received in formalin, labeled "left abdomen," and consists of a 0.6 x 0.5 cm tan-gray, wrinkled,   irregular skin shave. The skin shave is almost entirely covered by a brown, ill-defined area. The specimen is   bisected and submitted entirely in cassette 2.   Part 3   ID/AP label: [id number]   The specimen is received in formalin, labeled "left buttock," " and consists of a 0.8 x 0.7 cm tan-gray, wrinkled,   irregular skin shave. There is a centrally located 0.5 x 0.5 cm brown, ill-defined macule. The specimen is   bisected and submitted entirely in cassette 3.   Taylor Jean   Page 2        Biopsy proven mildly atypical nevus on left upper arm with clear margins  mildly atypical nevus on left posterior arm with clear margins,moderately atypical nevus on right lower back with clear margins-  2015  No hx skin ca   No known personal or family history atypical nevi or melanoma  Pt accompanied by nurse today, lives in assisted living due to history mental retardation.  Admits to history numerous moles since childhood. Does not feel that any lesions are changing to her knowledge. Previously had a mole removed in the past and told was benign.   Personal history endometrial cancer, sister  of unknown malignancy. .       Pt's nephew is her emergency contact  Numerous nevi, none changing to her knowledge               Review of Systems   Skin: Negative for activity-related sunscreen use and recent sunburn.        Objective:    Physical Exam   Skin:                      Diagram Legend     Erythematous scaling macule/papule c/w actinic keratosis       Vascular papule c/w angioma      Pigmented verrucoid papule/plaque c/w seborrheic keratosis      Yellow umbilicated papule c/w sebaceous hyperplasia      Irregularly shaped tan macule c/w lentigo     1-2 mm smooth white papules consistent with Milia      Movable subcutaneous cyst with punctum c/w epidermal inclusion cyst      Subcutaneous movable cyst c/w pilar cyst      Firm pink to brown papule c/w dermatofibroma      Pedunculated fleshy papule(s) c/w skin tag(s)      Evenly pigmented macule c/w junctional nevus     Mildly variegated pigmented, slightly irregular-bordered macule c/w mildly atypical nevus      Flesh colored to evenly pigmented papule c/w intradermal nevus       Pink pearly papule/plaque c/w basal cell  carcinoma      Erythematous hyperkeratotic cursted plaque c/w SCC      Surgical scar with no sign of skin cancer recurrence      Open and closed comedones      Inflammatory papules and pustules      Verrucoid papule consistent consistent with wart     Erythematous eczematous patches and plaques     Dystrophic onycholytic nail with subungual debris c/w onychomycosis     Umbilicated papule    Erythematous-base heme-crusted tan verrucoid plaque consistent with inflamed seborrheic keratosis     Erythematous Silvery Scaling Plaque c/w Psoriasis     See annotation      Assessment / Plan:        History of atypical nevus  Area(s) of previous atypical nevus evaluated with no signs of recurrence.    Upper body skin examination performed today including at least 6 points as noted in physical examination. No suspicious lesions noted.      Skin cancer screening  Upper body skin examination performed today including at least 6 points as noted in physical examination. No lesions suspicious for malignancy noted.        Multiple benign nevi  Discussed ABCDE's of nevi.  Monitor for new mole or moles that are becoming bigger, darker, irritated, or developing irregular borders.                Follow-up in about 6 months (around 10/3/2018).

## 2018-04-05 ENCOUNTER — OFFICE VISIT (OUTPATIENT)
Dept: PODIATRY | Facility: CLINIC | Age: 78
End: 2018-04-05
Payer: MEDICARE

## 2018-04-05 VITALS — BODY MASS INDEX: 34.02 KG/M2 | HEIGHT: 63 IN | WEIGHT: 192 LBS

## 2018-04-05 DIAGNOSIS — M20.42 HAMMER TOES OF BOTH FEET: ICD-10-CM

## 2018-04-05 DIAGNOSIS — B35.1 ONYCHOMYCOSIS DUE TO DERMATOPHYTE: ICD-10-CM

## 2018-04-05 DIAGNOSIS — E11.49 TYPE II DIABETES MELLITUS WITH NEUROLOGICAL MANIFESTATIONS: Primary | ICD-10-CM

## 2018-04-05 DIAGNOSIS — L84 CORN OR CALLUS: ICD-10-CM

## 2018-04-05 DIAGNOSIS — M20.41 HAMMER TOES OF BOTH FEET: ICD-10-CM

## 2018-04-05 PROCEDURE — 11721 DEBRIDE NAIL 6 OR MORE: CPT | Mod: Q9,59,S$PBB, | Performed by: PODIATRIST

## 2018-04-05 PROCEDURE — 99213 OFFICE O/P EST LOW 20 MIN: CPT | Mod: PBBFAC,PN,25 | Performed by: PODIATRIST

## 2018-04-05 PROCEDURE — 99999 PR PBB SHADOW E&M-EST. PATIENT-LVL III: CPT | Mod: PBBFAC,,, | Performed by: PODIATRIST

## 2018-04-05 PROCEDURE — 11721 DEBRIDE NAIL 6 OR MORE: CPT | Mod: PBBFAC,PN | Performed by: PODIATRIST

## 2018-04-05 PROCEDURE — 99499 UNLISTED E&M SERVICE: CPT | Mod: S$PBB,,, | Performed by: PODIATRIST

## 2018-04-05 PROCEDURE — 11057 PARNG/CUTG B9 HYPRKR LES >4: CPT | Mod: Q9,S$PBB,, | Performed by: PODIATRIST

## 2018-04-05 PROCEDURE — 11057 PARNG/CUTG B9 HYPRKR LES >4: CPT | Mod: PBBFAC,PN | Performed by: PODIATRIST

## 2018-04-05 NOTE — PROGRESS NOTES
Patient ID: Rebeca Turner is a 77 y.o. female.    Chief Complaint: Diabetes Mellitus (Johan  3/18) and Diabetic Foot Exam    Rebeca is a 77 y.o. female who presents to the clinic for evaluation and treatment of diabetic feet. Rebeca has a past medical history of Anemia of chronic disease; CKD (chronic kidney disease) stage 3, GFR 30-59 ml/min; DM type 2 (diabetes mellitus, type 2); DVT (deep venous thrombosis); Dysplastic nevi; Fatty liver; GERD (gastroesophageal reflux disease); H/O esophagogastroduodenoscopy; History of endometrial cancer; HTN (hypertension); Hyperlipidemia; LVE (left ventricular enlargement); Macular degeneration; MR (mental retardation); Obesity; LUISANA (obstructive sleep apnea); Osteoporosis; S/P colonoscopy; SBO (small bowel obstruction); Seizure disorder; and Vitreous detachment. Patient relates no major problem with feet. Only complaints today consist of toenails and calluses in need of trimming.  Denies being painful with wearing shoe gear.  Has not attempted to self treat.  Denies any additional pedal complaints.    PCP: JUSTICE Prado MD    Date Last Seen by PCP: 3/18      Hemoglobin A1C   Date Value Ref Range Status   01/18/2018 6.5 (H) 4.0 - 5.6 % Final     Comment:     According to ADA guidelines, hemoglobin A1c <7.0% represents  optimal control in non-pregnant diabetic patients. Different  metrics may apply to specific patient populations.   Standards of Medical Care in Diabetes-2016.  For the purpose of screening for the presence of diabetes:  <5.7%     Consistent with the absence of diabetes  5.7-6.4%  Consistent with increasing risk for diabetes   (prediabetes)  >or=6.5%  Consistent with diabetes  Currently, no consensus exists for use of hemoglobin A1c  for diagnosis of diabetes for children.  This Hemoglobin A1c assay has significant interference with fetal   hemoglobin   (HbF). The results are invalid for patients with abnormal amounts of   HbF,   including those with known  Hereditary Persistence   of Fetal Hemoglobin. Heterozygous hemoglobin variants (HbAS, HbAC,   HbAD, HbAE, HbA2) do not significantly interfere with this assay;   however, presence of multiple variants in a sample may impact the %   interference.     11/02/2016 6.9 (H) 4.5 - 6.2 % Final     Comment:     According to ADA guidelines, hemoglobin A1C <7.0% represents  optimal control in non-pregnant diabetic patients.  Different  metrics may apply to specific populations.   Standards of Medical Care in Diabetes - 2016.  For the purpose of screening for the presence of diabetes:  <5.7%     Consistent with the absence of diabetes  5.7-6.4%  Consistent with increasing risk for diabetes   (prediabetes)  >or=6.5%  Consistent with diabetes  Currently no consensus exists for use of hemoglobin A1C  for diagnosis of diabetes for children.     08/23/2016 6.2 4.5 - 6.2 % Final     Comment:     According to ADA guidelines, hemoglobin A1C <7.0% represents  optimal control in non-pregnant diabetic patients.  Different  metrics may apply to specific populations.   Standards of Medical Care in Diabetes - 2016.  For the purpose of screening for the presence of diabetes:  <5.7%     Consistent with the absence of diabetes  5.7-6.4%  Consistent with increasing risk for diabetes   (prediabetes)  >or=6.5%  Consistent with diabetes  Currently no consensus exists for use of hemoglobin A1C  for diagnosis of diabetes for children.             Past Medical History:   Diagnosis Date    Anemia of chronic disease     hematology su neg    CKD (chronic kidney disease) stage 3, GFR 30-59 ml/min     DM type 2 (diabetes mellitus, type 2)     DVT (deep venous thrombosis)     x 2 both post-op with last 6/16    Dysplastic nevi     h/o    Fatty liver     noted on usg 9/15    GERD (gastroesophageal reflux disease)     H/O esophagogastroduodenoscopy     normal 11/10    History of endometrial cancer     stage 1;  s/p SENDY with BSO 10/06    HTN  (hypertension)     Hyperlipidemia     LVE (left ventricular enlargement)     noted on 6/15 ECHO    Macular degeneration     exudative    MR (mental retardation)     Obesity     LUISANA (obstructive sleep apnea)     su in progress    Osteoporosis     osteopenia noted on 2/13 and 7/15 dexa    S/P colonoscopy     last 10/15;  next 10/19    SBO (small bowel obstruction)     h/o recurrent SBO;  s/p R hemicolectomy 12/10 and s/p incarcerated hernia repair with extensive scar tissue 6/16    Seizure disorder     Vitreous detachment        Past Surgical History:   Procedure Laterality Date    ABDOMINAL SURGERY  06/2016    Incarcerated incisional hernia, enterotomy x3, and extensive adhesions      CATARACT EXTRACTION      B 6/11    CHOLECYSTECTOMY      COLON SURGERY  12/6/2010  ([facility].)    R hemicolectomy 12/10 due to recurrent SBO from benign colon mass 12/2010.   Benign albeit large polyp.    COLONOSCOPY N/A 10/7/2015    Procedure: COLONOSCOPY;  Surgeon: Will Cr Jr., MD;  Location: [facility];  Service: Endoscopy;  Laterality: N/A;    COLONOSCOPY W/ POLYPECTOMY  11/22/2010  [facility].    3 cm growth mid ascending colon.  TUBULOVILLOUS ADENOMA. 9 mm polyp mid transverse colon.  TUBULAR ADENOMA.  10 mm polyp distal descending colon.  TUBULOVILLOUS ADENOMA.     ESOPHAGOGASTRODUODENOSCOPY  11/16/2010    Normal EGD.    HERNIA REPAIR  8/2015    from incarcerated hernia    HYSTERECTOMY  10/17/2006    SENDY and BSO 10/06 for stage 1 endometrial cancer    MYRINGOTOMY W/ TUBES      bilateral    TONSILLECTOMY         Family History   Problem Relation Age of Onset    Cancer Sister      details unknown    Heart attack Father     Pulmonary embolism Mother        Social History     Social History    Marital status: Single     Spouse name: N/A    Number of children: N/A    Years of education: N/A     Social History Main Topics    Smoking status: Never Smoker    Smokeless tobacco: Never Used    Alcohol  use No    Drug use: No    Sexual activity: No     Other Topics Concern    None     Social History Narrative    Lives at [facility].       Current Outpatient Prescriptions   Medication Sig Dispense Refill    alendronate (FOSAMAX) 70 MG tablet TAKE ONE TABLET WEEKLY AS DIRECTED ON THURSDAY 4 tablet 0    calcium carbonate-vitamin D3 (CALCIUM 600 + D,3,) 600 mg calcium- 200 unit Cap Take 1 capsule by mouth 2 (two) times daily.      CRESTOR 10 mg tablet TAKE ONE TABLET BY MOUTH EVERY NIGHT AT BEDTIME 30 tablet 0    diltiaZEM (CARDIZEM CD) 360 MG 24 hr capsule       dimethicone-kelvin-A-C-E-aloe (GOLD BOND ULTIMATE DIABETICS') 3-30 % Crea Apply topically 2 (two) times daily. APPLY TO FEET      docusate sodium (COLACE) 100 MG capsule Take 100 mg by mouth 2 (two) times daily.      fish oil-omega-3 fatty acids 300-1,000 mg capsule Take 2 g by mouth once daily.      hydrALAZINE (APRESOLINE) 50 MG tablet Take 50 mg by mouth every 12 (twelve) hours.      hydroCHLOROthiazide (HYDRODIURIL) 25 MG tablet       iron-vitamin C 100-250 mg, ICAR-C, 100-250 mg Tab Take one tablet in the morning with an acidic drink before breakfast. 90 tablet 0    labetalol (NORMODYNE) 300 MG tablet TAKE ONE TABLET BY MOUTH TWICE DAILY FOR BLOOD PRESSURE 60 tablet 0    LANTUS SOLOSTAR 100 unit/mL (3 mL) InPn pen Inject 35 Units into the skin every evening.       lisinopril (PRINIVIL,ZESTRIL) 20 MG tablet       lutein 20 mg Cap TAKE ONE SOFTGEL BY MOUTH EVERY MORNING 30 each 0    multivitamin capsule Take 1 capsule by mouth once daily.      polyethylene glycol (GLYCOLAX) 17 gram/dose powder       TEGRETOL  mg 12 hr tablet TAKE ONE TABLET BY MOUTH ONCE DAILY SEIZURES (Patient taking differently: one tablet bid) 30 tablet 0     No current facility-administered medications for this visit.        Review of patient's allergies indicates:  No Known Allergies      Review of Systems   Constitution: Negative for chills and fever.    Cardiovascular: Negative for claudication and leg swelling.   Skin: Positive for color change and nail changes.   Musculoskeletal: Negative for arthritis, joint pain and joint swelling.   Gastrointestinal: Negative for nausea and vomiting.   Neurological: Positive for numbness. Negative for paresthesias.   Psychiatric/Behavioral: Negative for altered mental status.           Objective:      Physical Exam   Constitutional: She is oriented to person, place, and time. She appears well-developed and well-nourished. No distress.   Cardiovascular:   Pulses:       Dorsalis pedis pulses are 2+ on the right side, and 2+ on the left side.        Posterior tibial pulses are 1+ on the right side, and 1+ on the left side.   CFT <3 seconds bilateral.  Pedal hair growth decreased bilateral.  Varicosities noted to bilateral lower extremity.  Mild nonpitting edema noted to bilateral lower extremity.  Toes are cool to touch bilateral.     Musculoskeletal: She exhibits edema. She exhibits no tenderness.   Muscle strength 5/5 in all muscle groups bilateral.  No tenderness nor crepitation with ROM of foot/ankle joints bilateral.  No tenderness with palpation of bilateral foot and ankle.  Bilateral pes planus foot type.  Bilateral semi-rigid contracture of toes 2-5.   Neurological: She is alert and oriented to person, place, and time. She has normal strength. A sensory deficit is present.   Protective sensation per [place] monofilament decreased bilateral.    Vibratory sensation decreased bilateral.    Light touch intact bilateral.   Skin: Skin is warm, dry and intact. Capillary refill takes less than 2 seconds. Lesion noted. No abrasion, no bruising, no burn, no ecchymosis, no laceration, no petechiae and no rash noted. She is not diaphoretic. No cyanosis or erythema. No pallor. Nails show no clubbing.   Pedal skin appears thin and atrophic bilateral.  Toenails x 10 appear thickened by 2 mm's, elongated by 2 mm's, and  discolored with subungual debris.  Hyperkeratotic lesion noted to bilateral sub 1st, 2nd, and 3rd metatarsal heads.  Also, noted to the tips of toes 2-3 bilateral.   Multiple nevi noted to the dorsum of the Lt. Foot. No break in the adjacent skin integrity.              Assessment:       Encounter Diagnoses   Name Primary?    Type II diabetes mellitus with neurological manifestations Yes    Onychomycosis due to dermatophyte     Corn or callus     Hammer toes of both feet          Plan:       Rebeca was seen today for diabetes mellitus and diabetic foot exam.    Diagnoses and all orders for this visit:    Type II diabetes mellitus with neurological manifestations    Onychomycosis due to dermatophyte    Corn or callus    Hammer toes of both feet      I counseled the patient on her conditions, their implications and medical management.    Advised to continue wearing shoe gear that accommodates for digital deformities.    Shoe inspection. Diabetic Foot Education. Patient reminded of the importance of good nutrition and blood sugar control to help prevent podiatric complications of diabetes. Patient instructed on proper foot hygeine. We discussed wearing proper shoe gear, daily foot inspections, never walking without protective shoe gear, never putting sharp instruments to feet    With patient's permission, nails were aggressively reduced and debrided x 10 to their soft tissue attachment mechanically and with electric , removing all offending nail and debris.  Also, a sterile #15 scalpel was used to trim lesions x 10 down to smooth appearing skin without incident.  Patient relates relief following the procedure. She will continue to monitor the areas daily, inspect her feet, wear protective shoe gear when ambulatory, moisturizer to maintain skin integrity and follow in this office in approximately 2-3 months, sooner p.r.n.    Follow-up in about 3 months (around 7/5/2018).    Sae Snowden DPM

## 2018-04-18 ENCOUNTER — PATIENT MESSAGE (OUTPATIENT)
Dept: NEUROLOGY | Facility: CLINIC | Age: 78
End: 2018-04-18

## 2018-05-21 ENCOUNTER — PROCEDURE VISIT (OUTPATIENT)
Dept: OPHTHALMOLOGY | Facility: CLINIC | Age: 78
End: 2018-05-21
Payer: MEDICARE

## 2018-05-21 VITALS — HEART RATE: 62 BPM | DIASTOLIC BLOOD PRESSURE: 55 MMHG | SYSTOLIC BLOOD PRESSURE: 160 MMHG

## 2018-05-21 DIAGNOSIS — H35.3222 EXUDATIVE AGE-RELATED MACULAR DEGENERATION OF LEFT EYE WITH INACTIVE CHOROIDAL NEOVASCULARIZATION: ICD-10-CM

## 2018-05-21 DIAGNOSIS — H43.393 VITREOUS FLOATERS OF BOTH EYES: ICD-10-CM

## 2018-05-21 DIAGNOSIS — H35.3211 EXUDATIVE AGE-RELATED MACULAR DEGENERATION OF RIGHT EYE WITH ACTIVE CHOROIDAL NEOVASCULARIZATION: Primary | ICD-10-CM

## 2018-05-21 PROCEDURE — 92134 CPTRZ OPH DX IMG PST SGM RTA: CPT | Mod: PBBFAC,PO | Performed by: OPHTHALMOLOGY

## 2018-05-21 PROCEDURE — 67028 INJECTION EYE DRUG: CPT | Mod: PBBFAC,PO,RT | Performed by: OPHTHALMOLOGY

## 2018-05-21 PROCEDURE — C9257 BEVACIZUMAB INJECTION: HCPCS | Mod: PBBFAC,JG,PO | Performed by: OPHTHALMOLOGY

## 2018-05-21 PROCEDURE — 67028 INJECTION EYE DRUG: CPT | Mod: S$PBB,RT,, | Performed by: OPHTHALMOLOGY

## 2018-05-21 PROCEDURE — 92014 COMPRE OPH EXAM EST PT 1/>: CPT | Mod: 25,S$PBB,, | Performed by: OPHTHALMOLOGY

## 2018-05-21 RX ORDER — INSULIN DETEMIR 100 [IU]/ML
35 INJECTION, SOLUTION SUBCUTANEOUS EVERY MORNING
COMMUNITY
Start: 2018-05-16

## 2018-05-21 RX ADMIN — BEVACIZUMAB 1.25 MG: 100 INJECTION, SOLUTION INTRAVENOUS at 10:05

## 2018-05-21 NOTE — PATIENT INSTRUCTIONS
POST INTRAVITREAL INJECTION PATIENT INSTRUCTIONS   Below are some guidelines for what to expect after your treatment and additional care instructions.   * you may experience mild discomfort in your eye after the treatment. Your eye usually feels better within 24 to 48 hours after the procedure.   * you have been given drops that numb the surface of your eye.   DO NOT rub or touch your eye, DO NOT wear contacts until numbness goes away.   * you may experience small spots that appear in your field of vision, these are usually caused by an air bubble or from the medication. It taked a few hours or days for these to go away.   * use of sunglasses will help reduce light sensitivity and glare.   * DO NOT swim or put sink water ( tap water ) or swin for at least 24 hours after the injection   * If you have a gritty, burning, irritating or stinging feeling in the injected eye. This may be a result of the antiseptic used. Use artifical tears or eye lubricant ( from over- the- counter from your local pharmacy ). If you have some at home already please check the expiration date, so not to use anything contaminated in your eye. A cool pack over your eye brow above the injected eye may also relieve discomfort.   Call us right away or go to the Emergency Department if you have a dramatic decrease in vision or extreme pain in the eye.   [facility] [phone]

## 2018-05-22 ENCOUNTER — OFFICE VISIT (OUTPATIENT)
Dept: HEMATOLOGY/ONCOLOGY | Facility: CLINIC | Age: 78
End: 2018-05-22
Payer: MEDICARE

## 2018-05-22 ENCOUNTER — LAB VISIT (OUTPATIENT)
Dept: LAB | Facility: HOSPITAL | Age: 78
End: 2018-05-22
Attending: NURSE PRACTITIONER
Payer: MEDICARE

## 2018-05-22 VITALS
WEIGHT: 179.25 LBS | HEIGHT: 63 IN | RESPIRATION RATE: 24 BRPM | SYSTOLIC BLOOD PRESSURE: 173 MMHG | DIASTOLIC BLOOD PRESSURE: 75 MMHG | BODY MASS INDEX: 31.76 KG/M2 | TEMPERATURE: 98 F | HEART RATE: 60 BPM

## 2018-05-22 DIAGNOSIS — N18.30 CKD (CHRONIC KIDNEY DISEASE) STAGE 3, GFR 30-59 ML/MIN: ICD-10-CM

## 2018-05-22 DIAGNOSIS — R74.8 ELEVATED ALKALINE PHOSPHATASE LEVEL: ICD-10-CM

## 2018-05-22 DIAGNOSIS — D50.9 IRON DEFICIENCY ANEMIA, UNSPECIFIED IRON DEFICIENCY ANEMIA TYPE: ICD-10-CM

## 2018-05-22 DIAGNOSIS — D50.9 IRON DEFICIENCY ANEMIA, UNSPECIFIED IRON DEFICIENCY ANEMIA TYPE: Primary | ICD-10-CM

## 2018-05-22 DIAGNOSIS — I10 ESSENTIAL HYPERTENSION: ICD-10-CM

## 2018-05-22 LAB
ALBUMIN SERPL BCP-MCNC: 3.9 G/DL
ALP SERPL-CCNC: 140 U/L
ALT SERPL W/O P-5'-P-CCNC: 23 U/L
ANION GAP SERPL CALC-SCNC: 8 MMOL/L
ANION GAP SERPL CALC-SCNC: 8 MMOL/L
AST SERPL-CCNC: 16 U/L
BASOPHILS # BLD AUTO: 0.03 K/UL
BASOPHILS NFR BLD: 0.4 %
BILIRUB SERPL-MCNC: 0.2 MG/DL
BUN SERPL-MCNC: 39 MG/DL
BUN SERPL-MCNC: 39 MG/DL
CALCIUM SERPL-MCNC: 9.9 MG/DL
CALCIUM SERPL-MCNC: 9.9 MG/DL
CHLORIDE SERPL-SCNC: 100 MMOL/L
CHLORIDE SERPL-SCNC: 100 MMOL/L
CO2 SERPL-SCNC: 34 MMOL/L
CO2 SERPL-SCNC: 34 MMOL/L
CREAT SERPL-MCNC: 1.21 MG/DL
CREAT SERPL-MCNC: 1.22 MG/DL
DIFFERENTIAL METHOD: ABNORMAL
EOSINOPHIL # BLD AUTO: 0.1 K/UL
EOSINOPHIL NFR BLD: 1.5 %
ERYTHROCYTE [DISTWIDTH] IN BLOOD BY AUTOMATED COUNT: 13.3 %
EST. GFR  (AFRICAN AMERICAN): 49 ML/MIN/1.73 M^2
EST. GFR  (AFRICAN AMERICAN): 50 ML/MIN/1.73 M^2
EST. GFR  (NON AFRICAN AMERICAN): 43 ML/MIN/1.73 M^2
EST. GFR  (NON AFRICAN AMERICAN): 43 ML/MIN/1.73 M^2
FERRITIN SERPL-MCNC: 52 NG/ML
GLUCOSE SERPL-MCNC: 104 MG/DL
GLUCOSE SERPL-MCNC: 104 MG/DL
HCT VFR BLD AUTO: 30.5 %
HGB BLD-MCNC: 9.8 G/DL
IRON SATN MFR SERPL: 19 %
IRON SERPL-MCNC: 59 UG/DL
LYMPHOCYTES # BLD AUTO: 1 K/UL
LYMPHOCYTES NFR BLD: 15.1 %
MCH RBC QN AUTO: 30.3 PG
MCHC RBC AUTO-ENTMCNC: 32.1 G/DL
MCV RBC AUTO: 94 FL
MONOCYTES # BLD AUTO: 0.6 K/UL
MONOCYTES NFR BLD: 9.1 %
NEUTROPHILS # BLD AUTO: 5 K/UL
NEUTROPHILS NFR BLD: 73.9 %
NRBC BLD-RTO: 0 /100 WBC
PLATELET # BLD AUTO: 218 K/UL
PMV BLD AUTO: 9.8 FL
POTASSIUM SERPL-SCNC: 4.3 MMOL/L
POTASSIUM SERPL-SCNC: 4.4 MMOL/L
PROT SERPL-MCNC: 6.8 G/DL
RBC # BLD AUTO: 3.23 M/UL
SODIUM SERPL-SCNC: 142 MMOL/L
SODIUM SERPL-SCNC: 142 MMOL/L
TOTAL IRON BINDING CAPACITY: 312 UG/DL
WBC # BLD AUTO: 6.71 K/UL

## 2018-05-22 PROCEDURE — 99213 OFFICE O/P EST LOW 20 MIN: CPT | Mod: S$PBB,,, | Performed by: NURSE PRACTITIONER

## 2018-05-22 PROCEDURE — 80053 COMPREHEN METABOLIC PANEL: CPT

## 2018-05-22 PROCEDURE — 82728 ASSAY OF FERRITIN: CPT

## 2018-05-22 PROCEDURE — 80053 COMPREHEN METABOLIC PANEL: CPT | Mod: PN

## 2018-05-22 PROCEDURE — 80048 BASIC METABOLIC PNL TOTAL CA: CPT | Mod: PN

## 2018-05-22 PROCEDURE — 82728 ASSAY OF FERRITIN: CPT | Mod: PN

## 2018-05-22 PROCEDURE — 83540 ASSAY OF IRON: CPT

## 2018-05-22 PROCEDURE — 83540 ASSAY OF IRON: CPT | Mod: PN

## 2018-05-22 PROCEDURE — 36415 COLL VENOUS BLD VENIPUNCTURE: CPT | Mod: PN

## 2018-05-22 PROCEDURE — 99214 OFFICE O/P EST MOD 30 MIN: CPT | Mod: PBBFAC,PN | Performed by: NURSE PRACTITIONER

## 2018-05-22 PROCEDURE — 85025 COMPLETE CBC W/AUTO DIFF WBC: CPT | Mod: PN

## 2018-05-22 PROCEDURE — 85025 COMPLETE CBC W/AUTO DIFF WBC: CPT

## 2018-05-22 PROCEDURE — 99999 PR PBB SHADOW E&M-EST. PATIENT-LVL IV: CPT | Mod: PBBFAC,,, | Performed by: NURSE PRACTITIONER

## 2018-05-22 PROCEDURE — 80048 BASIC METABOLIC PNL TOTAL CA: CPT

## 2018-05-22 RX ORDER — IRON,CARBONYL/ASCORBIC ACID 100-250 MG
TABLET ORAL
Qty: 90 TABLET | Refills: 0 | Status: SHIPPED | OUTPATIENT
Start: 2018-05-22 | End: 2019-10-14

## 2018-05-22 NOTE — PROGRESS NOTES
HISTORY OF PRESENT ILLNESS:  This is a 77-year-old white female known to   Dr. Voss for a diagnosis regarding hypochromic normocytic anemia in association   with elevated alkaline phosphatase levels.  She presents to the clinic today with her   sitter as she resides in a group home in [facility].  She denies any fatigue, weakness,   headaches, blurred vision, melena, pica, abdominal discomfort, nausea, vomiting,   constipation, diarrhea, fevers, chills, etc.  No other new complaints or pertinent findings   on a 14-point review of systems.    PHYSICAL EXAMINATION:  GENERAL:  Well-developed, well-nourished elderly white female, mentally   challenged, in no acute distress.  Alert and oriented to person and place.  VITAL SIGNS:  Weight:  Loss of 13 Pounds in 3 months with diet  Wt Readings from Last 3 Encounters:   05/22/18 81.3 kg (179 lb 3.7 oz)   04/05/18 87.1 kg (192 lb 0.3 oz)   04/03/18 87.1 kg (192 lb)     Temp Readings from Last 3 Encounters:   05/22/18 98 °F (36.7 °C)   02/23/18 98.2 °F (36.8 °C) (Oral)   05/11/17 97.8 °F (36.6 °C)     BP Readings from Last 3 Encounters:   05/22/18 (!) 173/75   05/21/18 (!) 160/55   03/05/18 (!) 165/55     Pulse Readings from Last 3 Encounters:   05/22/18 60   05/21/18 62   03/05/18 (!) 58     HEENT:  Normocephalic, atraumatic.  Oral mucosa pink and moist.  Lips without   lesions.  Tongue midline.  Oropharynx clear.  Nonicteric sclerae.   NECK:  Supple, no adenopathy.  No carotid bruits, thyromegaly or thyroid nodule.  HEART:  Regular rate and rhythm without murmur, gallop or rub.   LUNGS:  Clear to auscultation bilaterally.  Normal respiratory effort.   ABDOMEN:  Soft, nontender, nondistended with positive normoactive bowel sounds,   no hepatosplenomegaly.  EXTREMITIES:  No cyanosis, clubbing or edema.  Distal pulses are intact.     LABORATORY:   Lab Results   Component Value Date    WBC 6.71 05/22/2018    HGB 9.8 (L) 05/22/2018    HCT 30.5 (L) 05/22/2018    MCV 94 05/22/2018      05/22/2018     73.9% grans, 15.1% lymph  Hemoglobin increased from 9.0 to 9.8    CMP  Sodium   Date Value Ref Range Status   05/22/2018 142 136 - 145 mmol/L Final     Potassium   Date Value Ref Range Status   05/22/2018 4.3 3.5 - 5.1 mmol/L Final     Chloride   Date Value Ref Range Status   05/22/2018 100 95 - 110 mmol/L Final     CO2   Date Value Ref Range Status   05/22/2018 34 (H) 22 - 31 mmol/L Final     Glucose   Date Value Ref Range Status   05/22/2018 104 70 - 110 mg/dL Final     Comment:     The ADA recommends the following guidelines for fasting glucose:  Normal:       less than 100 mg/dL  Prediabetes:  100 mg/dL to 125 mg/dL  Diabetes:     126 mg/dL or higher       BUN, Bld   Date Value Ref Range Status   05/22/2018 39 (H) 7 - 18 mg/dL Final     Creatinine   Date Value Ref Range Status   05/22/2018 1.21 0.50 - 1.40 mg/dL Final     Calcium   Date Value Ref Range Status   05/22/2018 9.9 8.4 - 10.2 mg/dL Final     Total Protein   Date Value Ref Range Status   05/22/2018 6.8 6.0 - 8.4 g/dL Final     Albumin   Date Value Ref Range Status   05/22/2018 3.9 3.5 - 5.2 g/dL Final     Total Bilirubin   Date Value Ref Range Status   05/22/2018 0.2 0.2 - 1.3 mg/dL Final     Alkaline Phosphatase   Date Value Ref Range Status   05/22/2018 140 38 - 145 U/L Final     AST   Date Value Ref Range Status   05/22/2018 16 14 - 36 U/L Final     ALT   Date Value Ref Range Status   05/22/2018 23 10 - 44 U/L Final     Anion Gap   Date Value Ref Range Status   05/22/2018 8 8 - 16 mmol/L Final     eGFR if    Date Value Ref Range Status   05/22/2018 50 (A) >60 mL/min/1.73 m^2 Final     eGFR if non    Date Value Ref Range Status   05/22/2018 43 (A) >60 mL/min/1.73 m^2 Final     Comment:     Calculation used to obtain the estimated glomerular filtration  rate (eGFR) is the CKD-EPI equation.          Lab Results   Component Value Date    IRON 59 05/22/2018    TIBC 312 05/22/2018    FERRITIN 52  05/22/2018     Iron Saturation 19  sTFR pending    IMPRESSION:  1.  Anemia of chronic disease, stable  2.  CKD III.  3.  Fatty infiltration of the liver.  4.  Elevated alk phos - stable.  5.  HTN - follow with primary    PLAN:    1.  Continue ICAR-C 100/250 1 tablet daily for 3 additional months; Rx escribed to [facility]; orders placed on Thor sheet.  2.  Return to clinic in 3 months with interval CBC, CMP, Iron studies prior.    Assessment/plan reviewed and approved by Dr. Voss.

## 2018-05-24 LAB — STFR SERPL-MCNC: 2.6 MG/L

## 2018-06-11 ENCOUNTER — TELEPHONE (OUTPATIENT)
Dept: PODIATRY | Facility: CLINIC | Age: 78
End: 2018-06-11

## 2018-06-13 ENCOUNTER — TELEPHONE (OUTPATIENT)
Dept: NEUROLOGY | Facility: CLINIC | Age: 78
End: 2018-06-13

## 2018-06-13 NOTE — TELEPHONE ENCOUNTER
----- Message from Antonio Gould sent at 6/12/2018  2:52 PM CDT -----  Contact: Vignesh nurse at facility  Calling to reschedule her appointment to 07/27 @10:40 and give the appointment tomorrow to Lia Morrissey:   [phone] at 11:20 06/13. Please call back [phone] thanks!

## 2018-07-18 ENCOUNTER — OFFICE VISIT (OUTPATIENT)
Dept: PODIATRY | Facility: CLINIC | Age: 78
End: 2018-07-18
Payer: MEDICARE

## 2018-07-18 VITALS — WEIGHT: 179.25 LBS | BODY MASS INDEX: 31.76 KG/M2 | HEIGHT: 63 IN

## 2018-07-18 DIAGNOSIS — B35.1 ONYCHOMYCOSIS DUE TO DERMATOPHYTE: ICD-10-CM

## 2018-07-18 DIAGNOSIS — L84 CORN OR CALLUS: ICD-10-CM

## 2018-07-18 DIAGNOSIS — M20.41 HAMMER TOES OF BOTH FEET: ICD-10-CM

## 2018-07-18 DIAGNOSIS — E11.49 TYPE II DIABETES MELLITUS WITH NEUROLOGICAL MANIFESTATIONS: Primary | ICD-10-CM

## 2018-07-18 DIAGNOSIS — M20.42 HAMMER TOES OF BOTH FEET: ICD-10-CM

## 2018-07-18 PROCEDURE — 99212 OFFICE O/P EST SF 10 MIN: CPT | Mod: PBBFAC,PN,25 | Performed by: PODIATRIST

## 2018-07-18 PROCEDURE — 11721 DEBRIDE NAIL 6 OR MORE: CPT | Mod: Q9,59,S$PBB, | Performed by: PODIATRIST

## 2018-07-18 PROCEDURE — 11721 DEBRIDE NAIL 6 OR MORE: CPT | Mod: PBBFAC,PN | Performed by: PODIATRIST

## 2018-07-18 PROCEDURE — 99499 UNLISTED E&M SERVICE: CPT | Mod: S$PBB,,, | Performed by: PODIATRIST

## 2018-07-18 PROCEDURE — 99999 PR PBB SHADOW E&M-EST. PATIENT-LVL II: CPT | Mod: PBBFAC,,, | Performed by: PODIATRIST

## 2018-07-18 PROCEDURE — 11057 PARNG/CUTG B9 HYPRKR LES >4: CPT | Mod: PBBFAC,PN | Performed by: PODIATRIST

## 2018-07-18 PROCEDURE — 11057 PARNG/CUTG B9 HYPRKR LES >4: CPT | Mod: Q9,S$PBB,, | Performed by: PODIATRIST

## 2018-07-18 NOTE — PROGRESS NOTES
Patient ID: Rebeca Turner is a 77 y.o. female.    Chief Complaint: Diabetes Mellitus (Johan  6/18  A1c 1/18/17 6.5)    Rebeca is a 77 y.o. female who presents to the clinic for evaluation and treatment of diabetic feet. Rebeca has a past medical history of Anemia of chronic disease; CKD (chronic kidney disease) stage 3, GFR 30-59 ml/min; DM type 2 (diabetes mellitus, type 2); DVT (deep venous thrombosis); Dysplastic nevi; Fatty liver; GERD (gastroesophageal reflux disease); H/O esophagogastroduodenoscopy; History of endometrial cancer; HTN (hypertension); Hyperlipidemia; LVE (left ventricular enlargement); Macular degeneration; MR (mental retardation); Obesity; LUISANA (obstructive sleep apnea); Osteoporosis; S/P colonoscopy; SBO (small bowel obstruction); Seizure disorder; and Vitreous detachment. Patient relates no major problem with feet. Only complaints today consist of toenails and calluses in need of trimming.  Denies being painful with wearing shoe gear.  Regularly applies moisturizer to the calluses of bilateral foot. Denies any additional pedal complaints.    PCP: JUSTICE Prado MD    Date Last Seen by PCP: 6/18    Hemoglobin A1C   Date Value Ref Range Status   01/18/2018 6.5 (H) 4.0 - 5.6 % Final     Comment:     According to ADA guidelines, hemoglobin A1c <7.0% represents  optimal control in non-pregnant diabetic patients. Different  metrics may apply to specific patient populations.   Standards of Medical Care in Diabetes-2016.  For the purpose of screening for the presence of diabetes:  <5.7%     Consistent with the absence of diabetes  5.7-6.4%  Consistent with increasing risk for diabetes   (prediabetes)  >or=6.5%  Consistent with diabetes  Currently, no consensus exists for use of hemoglobin A1c  for diagnosis of diabetes for children.  This Hemoglobin A1c assay has significant interference with fetal   hemoglobin   (HbF). The results are invalid for patients with abnormal amounts of   HbF,   including  those with known Hereditary Persistence   of Fetal Hemoglobin. Heterozygous hemoglobin variants (HbAS, HbAC,   HbAD, HbAE, HbA2) do not significantly interfere with this assay;   however, presence of multiple variants in a sample may impact the %   interference.     11/02/2016 6.9 (H) 4.5 - 6.2 % Final     Comment:     According to ADA guidelines, hemoglobin A1C <7.0% represents  optimal control in non-pregnant diabetic patients.  Different  metrics may apply to specific populations.   Standards of Medical Care in Diabetes - 2016.  For the purpose of screening for the presence of diabetes:  <5.7%     Consistent with the absence of diabetes  5.7-6.4%  Consistent with increasing risk for diabetes   (prediabetes)  >or=6.5%  Consistent with diabetes  Currently no consensus exists for use of hemoglobin A1C  for diagnosis of diabetes for children.     08/23/2016 6.2 4.5 - 6.2 % Final     Comment:     According to ADA guidelines, hemoglobin A1C <7.0% represents  optimal control in non-pregnant diabetic patients.  Different  metrics may apply to specific populations.   Standards of Medical Care in Diabetes - 2016.  For the purpose of screening for the presence of diabetes:  <5.7%     Consistent with the absence of diabetes  5.7-6.4%  Consistent with increasing risk for diabetes   (prediabetes)  >or=6.5%  Consistent with diabetes  Currently no consensus exists for use of hemoglobin A1C  for diagnosis of diabetes for children.             Past Medical History:   Diagnosis Date    Anemia of chronic disease     hematology su neg    CKD (chronic kidney disease) stage 3, GFR 30-59 ml/min     DM type 2 (diabetes mellitus, type 2)     DVT (deep venous thrombosis)     x 2 both post-op with last 6/16    Dysplastic nevi     h/o    Fatty liver     noted on usg 9/15    GERD (gastroesophageal reflux disease)     H/O esophagogastroduodenoscopy     normal 11/10    History of endometrial cancer     stage 1;  s/p SENDY with BSO 10/06     HTN (hypertension)     Hyperlipidemia     LVE (left ventricular enlargement)     noted on 6/15 ECHO    Macular degeneration     exudative    MR (mental retardation)     Obesity     LUISANA (obstructive sleep apnea)     su in progress    Osteoporosis     osteopenia noted on 2/13 and 7/15 dexa    S/P colonoscopy     last 10/15;  next 10/19    SBO (small bowel obstruction)     h/o recurrent SBO;  s/p R hemicolectomy 12/10 and s/p incarcerated hernia repair with extensive scar tissue 6/16    Seizure disorder     Vitreous detachment        Past Surgical History:   Procedure Laterality Date    ABDOMINAL SURGERY  06/2016    Incarcerated incisional hernia, enterotomy x3, and extensive adhesions      CATARACT EXTRACTION      B 6/11    CHOLECYSTECTOMY      COLON SURGERY  12/6/2010  (Ms Dinorah.)    R hemicolectomy 12/10 due to recurrent SBO from benign colon mass 12/2010.   Benign albeit large polyp.    COLONOSCOPY N/A 10/7/2015    Procedure: COLONOSCOPY;  Surgeon: Will Cr Jr., MD;  [facility];  Service: Endoscopy;  Laterality: N/A;    COLONOSCOPY W/ POLYPECTOMY  11/22/2010  Ms [facility].    3 cm growth mid ascending colon.  TUBULOVILLOUS ADENOMA. 9 mm polyp mid transverse colon.  TUBULAR ADENOMA.  10 mm polyp distal descending colon.  TUBULOVILLOUS ADENOMA.     ESOPHAGOGASTRODUODENOSCOPY  11/16/2010    Normal EGD.    HERNIA REPAIR  8/2015    from incarcerated hernia    HYSTERECTOMY  10/17/2006    SENDY and BSO 10/06 for stage 1 endometrial cancer    MYRINGOTOMY W/ TUBES      bilateral    TONSILLECTOMY         Family History   Problem Relation Age of Onset    Cancer Sister         details unknown    Heart attack Father     Pulmonary embolism Mother        Social History     Social History    Marital status: Single     Spouse name: N/A    Number of children: N/A    Years of education: N/A     Social History Main Topics    Smoking status: Never Smoker    Smokeless tobacco: Never Used     Alcohol use No    Drug use: No    Sexual activity: No     Other Topics Concern    None     Social History Narrative    Lives at [facility].       Current Outpatient Prescriptions   Medication Sig Dispense Refill    alendronate (FOSAMAX) 70 MG tablet TAKE ONE TABLET WEEKLY AS DIRECTED ON THURSDAY 4 tablet 0    calcium carbonate-vitamin D3 (CALCIUM 600 + D,3,) 600 mg calcium- 200 unit Cap Take 1 capsule by mouth 2 (two) times daily.      CRESTOR 10 mg tablet TAKE ONE TABLET BY MOUTH EVERY NIGHT AT BEDTIME 30 tablet 0    diltiaZEM (CARDIZEM CD) 360 MG 24 hr capsule       dimethicone-kelvin-A-C-E-aloe (GOLD BOND ULTIMATE DIABETICS') 3-30 % Crea Apply topically 2 (two) times daily. APPLY TO FEET      docusate sodium (COLACE) 100 MG capsule Take 100 mg by mouth 2 (two) times daily.      fish oil-omega-3 fatty acids 300-1,000 mg capsule Take 2 g by mouth once daily.      hydrALAZINE (APRESOLINE) 50 MG tablet Take 50 mg by mouth every 12 (twelve) hours.      hydroCHLOROthiazide (HYDRODIURIL) 25 MG tablet       iron-vitamin C 100-250 mg, ICAR-C, 100-250 mg Tab Take one tablet in the morning with an acidic drink before breakfast. 90 tablet 0    labetalol (NORMODYNE) 300 MG tablet TAKE ONE TABLET BY MOUTH TWICE DAILY FOR BLOOD PRESSURE 60 tablet 0    LANTUS SOLOSTAR 100 unit/mL (3 mL) InPn pen Inject 35 Units into the skin every evening.       lisinopril (PRINIVIL,ZESTRIL) 20 MG tablet       lutein 20 mg Cap TAKE ONE SOFTGEL BY MOUTH EVERY MORNING 30 each 0    multivitamin capsule Take 1 capsule by mouth once daily.      polyethylene glycol (GLYCOLAX) 17 gram/dose powder       TEGRETOL  mg 12 hr tablet TAKE ONE TABLET BY MOUTH ONCE DAILY SEIZURES (Patient taking differently: one tablet bid) 30 tablet 0     No current facility-administered medications for this visit.        Review of patient's allergies indicates:  No Known Allergies      Review of Systems   Constitution: Negative for chills and fever.    Cardiovascular: Negative for claudication and leg swelling.   Skin: Positive for color change and nail changes.   Musculoskeletal: Negative for arthritis, joint pain and joint swelling.   Gastrointestinal: Negative for nausea and vomiting.   Neurological: Positive for numbness. Negative for paresthesias.   Psychiatric/Behavioral: Negative for altered mental status.           Objective:      Physical Exam   Constitutional: She is oriented to person, place, and time. She appears well-developed and well-nourished. No distress.   Cardiovascular:   Pulses:       Dorsalis pedis pulses are 2+ on the right side, and 2+ on the left side.        Posterior tibial pulses are 1+ on the right side, and 1+ on the left side.   CFT <3 seconds bilateral.  Pedal hair growth decreased bilateral.  Varicosities noted to bilateral lower extremity.  Mild nonpitting edema noted to bilateral lower extremity.  Toes are cool to touch bilateral.     Musculoskeletal: She exhibits edema. She exhibits no tenderness.   Muscle strength 5/5 in all muscle groups bilateral.  No tenderness nor crepitation with ROM of foot/ankle joints bilateral.  No tenderness with palpation of bilateral foot and ankle.  Bilateral pes planus foot type.  Bilateral semi-rigid contracture of toes 2-5.   Neurological: She is alert and oriented to person, place, and time. She has normal strength. A sensory deficit is present.   Protective sensation per [place] monofilament decreased bilateral.    Vibratory sensation decreased bilateral.    Light touch intact bilateral.   Skin: Skin is warm, dry and intact. Capillary refill takes less than 2 seconds. Lesion noted. No abrasion, no bruising, no burn, no ecchymosis, no laceration, no petechiae and no rash noted. She is not diaphoretic. No cyanosis or erythema. No pallor. Nails show no clubbing.   Pedal skin appears thin and atrophic bilateral.  Toenails x 10 appear thickened by 2 mm's, elongated by 2 mm's, and  discolored with subungual debris.  Hyperkeratotic lesion noted to bilateral sub 1st, 2nd, and 3rd metatarsal heads.  Also, noted to the tips of toes 2-3 bilateral.   Multiple nevi noted to the dorsum of the Lt. Foot. No break in the adjacent skin integrity.              Assessment:       Encounter Diagnoses   Name Primary?    Type II diabetes mellitus with neurological manifestations Yes    Onychomycosis due to dermatophyte     Hammer toes of both feet     Corn or callus          Plan:       Rebeca was seen today for diabetes mellitus.    Diagnoses and all orders for this visit:    Type II diabetes mellitus with neurological manifestations    Onychomycosis due to dermatophyte    Hammer toes of both feet    Corn or callus      I counseled the patient on her conditions, their implications and medical management.    Advised to continue wearing shoe gear that accommodates for digital deformities.    Shoe inspection. Diabetic Foot Education. Patient reminded of the importance of good nutrition and blood sugar control to help prevent podiatric complications of diabetes. Patient instructed on proper foot hygeine. We discussed wearing proper shoe gear, daily foot inspections, never walking without protective shoe gear, never putting sharp instruments to feet    With patient's permission, nails were aggressively reduced and debrided x 10 to their soft tissue attachment mechanically and with electric , removing all offending nail and debris.  Also, a sterile #15 scalpel was used to trim lesions x 10 down to smooth appearing skin without incident.  Patient relates relief following the procedure. She will continue to monitor the areas daily, inspect her feet, wear protective shoe gear when ambulatory, moisturizer to maintain skin integrity and follow in this office in approximately 2-3 months, sooner p.r.n.    Follow-up in about 3 months (around 10/18/2018).    Sae Snowden DPM

## 2018-07-30 ENCOUNTER — PROCEDURE VISIT (OUTPATIENT)
Dept: OPHTHALMOLOGY | Facility: CLINIC | Age: 78
End: 2018-07-30
Payer: MEDICARE

## 2018-07-30 VITALS — SYSTOLIC BLOOD PRESSURE: 159 MMHG | DIASTOLIC BLOOD PRESSURE: 62 MMHG | HEART RATE: 56 BPM

## 2018-07-30 DIAGNOSIS — H35.3211 EXUDATIVE AGE-RELATED MACULAR DEGENERATION OF RIGHT EYE WITH ACTIVE CHOROIDAL NEOVASCULARIZATION: Primary | ICD-10-CM

## 2018-07-30 DIAGNOSIS — H35.3222 EXUDATIVE AGE-RELATED MACULAR DEGENERATION OF LEFT EYE WITH INACTIVE CHOROIDAL NEOVASCULARIZATION: ICD-10-CM

## 2018-07-30 PROCEDURE — 92134 CPTRZ OPH DX IMG PST SGM RTA: CPT | Mod: PBBFAC,PO | Performed by: OPHTHALMOLOGY

## 2018-07-30 PROCEDURE — 92014 COMPRE OPH EXAM EST PT 1/>: CPT | Mod: 25,S$PBB,, | Performed by: OPHTHALMOLOGY

## 2018-07-30 PROCEDURE — 67028 INJECTION EYE DRUG: CPT | Mod: S$PBB,79,RT, | Performed by: OPHTHALMOLOGY

## 2018-07-30 PROCEDURE — 67028 INJECTION EYE DRUG: CPT | Mod: PBBFAC,PO,RT | Performed by: OPHTHALMOLOGY

## 2018-07-30 RX ORDER — CETIRIZINE HYDROCHLORIDE 10 MG/1
10 TABLET ORAL DAILY
COMMUNITY
End: 2019-10-14

## 2018-07-30 RX ORDER — ACETAMINOPHEN 500 MG
500 TABLET ORAL EVERY 6 HOURS PRN
COMMUNITY
End: 2021-02-05

## 2018-07-30 RX ORDER — NAPROXEN SODIUM 220 MG/1
81 TABLET, FILM COATED ORAL DAILY
COMMUNITY
End: 2019-10-14

## 2018-07-30 RX ADMIN — AFLIBERCEPT 2 MG: 40 INJECTION, SOLUTION INTRAVITREAL at 11:07

## 2018-07-30 NOTE — PATIENT INSTRUCTIONS
POST INTRAVITREAL INJECTION PATIENT INSTRUCTIONS   Below are some guidelines for what to expect after your treatment and additional care instructions.   * you may experience mild discomfort in your eye after the treatment. Your eye usually feels better within 24 to 48 hours after the procedure.   * you have been given drops that numb the surface of your eye.   DO NOT rub or touch your eye, DO NOT wear contacts until numbness goes away.   * you may experience small spots that appear in your field of vision, these are usually caused by an air bubble or from the medication. It taked a few hours or days for these to go away.   * use of sunglasses will help reduce light sensitivity and glare.   * DO NOT swim or put sink water ( tap water ) or swin for at least 24 hours after the injection   * If you have a gritty, burning, irritating or stinging feeling in the injected eye. This may be a result of the antiseptic used. Use artifical tears or eye lubricant ( from over- the- counter from your local pharmacy ). If you have some at home already please check the expiration date, so not to use anything contaminated in your eye. A cool pack over your eye brow above the injected eye may also relieve discomfort.   Call us right away or go to the Emergency Department if you have a dramatic decrease in vision or extreme pain in the eye.   [facility] [phone]

## 2018-07-30 NOTE — PROGRESS NOTES
HPI     10 wk / OCT / Avastin   DLS- 05/21/2018 Dr. Sue     Pt sts vision about the same since last visit. Denies pain   (-)Flashes (-)Floaters  (-)Photophobia  (-)Glare         Comments   DLS 3/5/18- No changes x last visit - stable vision        OCT - OD  PED's with SRF  OS - SR Fibrosis - NO SRF      A/P    1. Wet AMD OS  S/p Avastin injections outside  Stable today with cicatrix  Observe    2. Wet AMD OD  S/p Avastin OD x 11  S/p Eylea OD x 8    Eylea OD    Alternate tx      AREDS/AG    3. DM  No significant DR  BS/BP/chol control    4. PCIOL OU      10-11 weeks OCT      Risks, benefits, and alternatives to treatment discussed in detail with the patient.  The patient voiced understanding and wished to proceed with the procedure    Injection Procedure Note:  Diagnosis: Wet AMD OD    Topical Proparacaine and Betadine.  Injec Eylea OD at 6:00 @ 3.5-4mm posterior to limbus  Post Operative Dx: Same  Complications: None  Follow up as above.

## 2018-08-09 ENCOUNTER — OFFICE VISIT (OUTPATIENT)
Dept: CARDIOLOGY | Facility: CLINIC | Age: 78
End: 2018-08-09
Payer: MEDICARE

## 2018-08-09 VITALS
BODY MASS INDEX: 31.02 KG/M2 | HEIGHT: 63 IN | WEIGHT: 175.06 LBS | DIASTOLIC BLOOD PRESSURE: 58 MMHG | HEART RATE: 62 BPM | SYSTOLIC BLOOD PRESSURE: 152 MMHG

## 2018-08-09 DIAGNOSIS — E11.9 TYPE 2 DIABETES MELLITUS WITHOUT COMPLICATION, WITH LONG-TERM CURRENT USE OF INSULIN: ICD-10-CM

## 2018-08-09 DIAGNOSIS — Z79.4 TYPE 2 DIABETES MELLITUS WITHOUT COMPLICATION, WITH LONG-TERM CURRENT USE OF INSULIN: ICD-10-CM

## 2018-08-09 DIAGNOSIS — I10 ESSENTIAL HYPERTENSION: ICD-10-CM

## 2018-08-09 DIAGNOSIS — I44.1 MOBITZ (TYPE) I (WENCKEBACH'S) ATRIOVENTRICULAR BLOCK: Primary | ICD-10-CM

## 2018-08-09 PROCEDURE — 99999 PR PBB SHADOW E&M-EST. PATIENT-LVL III: CPT | Mod: PBBFAC,,, | Performed by: INTERNAL MEDICINE

## 2018-08-09 PROCEDURE — 99214 OFFICE O/P EST MOD 30 MIN: CPT | Mod: S$PBB,,, | Performed by: INTERNAL MEDICINE

## 2018-08-09 PROCEDURE — 99213 OFFICE O/P EST LOW 20 MIN: CPT | Mod: PBBFAC,PO | Performed by: INTERNAL MEDICINE

## 2018-08-09 NOTE — PROGRESS NOTES
Subjective:    Patient ID:  Rebeca Turner is a 78 y.o. female who presents for follow-up of heart block (follow up); Hypertension; and Dementia      Pt referred back with h/o mobitz I Wenkebach AVB found incidentally during a sleep study in 2016. She has had no symptoms associated with the intermittent rhythm. Last seen in January and we repeated a Holter and Echo. The Wenkebach is mostly during sleep. Echo was unremarkable. She denies any palpitations, dizziness, syncope or pre-syncope. She denies any chest pain, SOB, PND, orthopnea.        Review of Systems   Constitution: Negative for weight gain and weight loss.   HENT: Negative.    Eyes: Negative.    Cardiovascular: Negative for chest pain, claudication, cyanosis, dyspnea on exertion, irregular heartbeat, leg swelling, near-syncope, orthopnea (no PND), palpitations and syncope.   Respiratory: Negative for cough, hemoptysis, shortness of breath and snoring.    Endocrine: Negative.    Skin: Negative.    Musculoskeletal: Negative for joint pain, muscle cramps, muscle weakness and myalgias.   Gastrointestinal: Negative for diarrhea, hematemesis, nausea and vomiting.   Genitourinary: Negative.    Neurological: Negative for dizziness, focal weakness, light-headedness, loss of balance, numbness, paresthesias and seizures.   Psychiatric/Behavioral: Negative.         Objective:    Physical Exam   Constitutional: She appears well-developed and well-nourished.   Eyes: Pupils are equal, round, and reactive to light.   Neck: Normal range of motion. No thyromegaly present.   Cardiovascular: Normal rate, regular rhythm, S1 normal, S2 normal, normal heart sounds, intact distal pulses and normal pulses.   No extrasystoles are present. PMI is not displaced.  Exam reveals no friction rub.    No murmur heard.  Pulmonary/Chest: Effort normal and breath sounds normal. She has no wheezes. She has no rales. She exhibits no tenderness.   Abdominal: Soft. Bowel sounds are normal. She  exhibits no distension and no mass. There is no tenderness.   Musculoskeletal: Normal range of motion. She exhibits no edema.   Neurological: She is alert.   Skin: Skin is warm and dry.   Vitals reviewed.      Test(s) Reviewed  I have reviewed the following in detail:  [] Stress test   [] Angiography   [x] Echocardiogram   [] Labs   [x] Other:  Holter       Assessment:       1. Mobitz (type) I (Wenckebach's) atrioventricular block    2. Essential hypertension    3. Type 2 diabetes mellitus without complication, with long-term current use of insulin         Plan:       Presently she is asymptomatic.   If she should develop dizziness or presyncopal spells will need to reconsider the Ca++ blocker and B-blocker on her med list  For now no further w/u needed  Can f/u as needed  Will forward this note, Echo and Holter results to her assisted living [place]

## 2018-08-16 ENCOUNTER — OFFICE VISIT (OUTPATIENT)
Dept: OTOLARYNGOLOGY | Facility: CLINIC | Age: 78
End: 2018-08-16
Payer: MEDICARE

## 2018-08-16 ENCOUNTER — CLINICAL SUPPORT (OUTPATIENT)
Dept: AUDIOLOGY | Facility: CLINIC | Age: 78
End: 2018-08-16
Payer: MEDICARE

## 2018-08-16 VITALS
WEIGHT: 174.63 LBS | HEIGHT: 63 IN | BODY MASS INDEX: 30.94 KG/M2 | HEART RATE: 56 BPM | DIASTOLIC BLOOD PRESSURE: 57 MMHG | SYSTOLIC BLOOD PRESSURE: 177 MMHG

## 2018-08-16 DIAGNOSIS — H93.293 IMPAIRED AUDITORY DISCRIMINATION, BILATERAL: ICD-10-CM

## 2018-08-16 DIAGNOSIS — H91.90 HEARING DIFFICULTY, UNSPECIFIED LATERALITY: Primary | ICD-10-CM

## 2018-08-16 DIAGNOSIS — H61.23 BILATERAL IMPACTED CERUMEN: ICD-10-CM

## 2018-08-16 DIAGNOSIS — H90.3 BILATERAL SENSORINEURAL HEARING LOSS: Primary | ICD-10-CM

## 2018-08-16 DIAGNOSIS — Z97.4 WEARS HEARING AID IN BOTH EARS: ICD-10-CM

## 2018-08-16 PROCEDURE — G0268 REMOVAL OF IMPACTED WAX MD: HCPCS | Mod: S$PBB,,, | Performed by: NURSE PRACTITIONER

## 2018-08-16 PROCEDURE — 99214 OFFICE O/P EST MOD 30 MIN: CPT | Mod: PBBFAC,27,PO | Performed by: NURSE PRACTITIONER

## 2018-08-16 PROCEDURE — 92557 COMPREHENSIVE HEARING TEST: CPT | Mod: PBBFAC,PO | Performed by: AUDIOLOGIST-HEARING AID FITTER

## 2018-08-16 PROCEDURE — 99999 PR PBB SHADOW E&M-EST. PATIENT-LVL IV: CPT | Mod: PBBFAC,,, | Performed by: NURSE PRACTITIONER

## 2018-08-16 PROCEDURE — G0268 REMOVAL OF IMPACTED WAX MD: HCPCS | Mod: PBBFAC,PO | Performed by: NURSE PRACTITIONER

## 2018-08-16 PROCEDURE — 92567 TYMPANOMETRY: CPT | Mod: PBBFAC,PO | Performed by: AUDIOLOGIST-HEARING AID FITTER

## 2018-08-16 PROCEDURE — 99213 OFFICE O/P EST LOW 20 MIN: CPT | Mod: 25,S$PBB,, | Performed by: NURSE PRACTITIONER

## 2018-08-16 PROCEDURE — 99999 PR PBB SHADOW E&M-EST. PATIENT-LVL I: CPT | Mod: PBBFAC,,,

## 2018-08-16 PROCEDURE — 99211 OFF/OP EST MAY X REQ PHY/QHP: CPT | Mod: PBBFAC,PO,25

## 2018-08-16 NOTE — PROGRESS NOTES
Subjective:       Patient ID: Rebeca Turner is a 78 y.o. female.    Chief Complaint: Cerumen Impaction and Hearing Loss    HPI   Patient returns today for her annual audiogram. She wears bilateral hearing aids and has severe to profound predominantly sensorineural hearing loss. She has worn full-shell behind-the-ear (BTE) hearing aids for many years. She resides at [facility]. She denies current ENT symptoms or complaints.     Review of Systems   Constitutional: Negative.    HENT: Positive for hearing loss.    Eyes: Negative.    Respiratory: Negative.    Cardiovascular: Negative.    Gastrointestinal: Negative for abdominal pain, nausea and vomiting.   Musculoskeletal: Negative.    Skin: Negative.    Neurological: Negative.    Psychiatric/Behavioral: Negative.        Objective:      Physical Exam   Constitutional: She is oriented to person, place, and time. Vital signs are normal. She appears well-developed and well-nourished. She is cooperative. She does not appear ill. No distress.   HENT:   Head: Normocephalic and atraumatic.   Right Ear: Hearing, tympanic membrane, external ear and ear canal normal. Tympanic membrane is not erythematous. No middle ear effusion.   Left Ear: Hearing, tympanic membrane, external ear and ear canal normal. Tympanic membrane is not erythematous.  No middle ear effusion.   Nose: Nose normal. No mucosal edema or rhinorrhea.   Mouth/Throat: Uvula is midline, oropharynx is clear and moist and mucous membranes are normal.     SEPARATE PROCEDURE IN OFFICE:   Procedure: Removal of impacted cerumen, bilateral   Pre Procedure Diagnosis: Cerumen Impaction   Post Procedure Diagnosis: Cerumen Impaction   Verbal informed consent in regards to risk of trauma to ear canal, ear drum or hearing, discomfort during procedure and/or inability to remove cerumen impaction in one session or unforeseen events or complications.   No anesthesia.     Procedure in detail:   Ear canal visualized  bilateral with appropriate size ear speculum utilizing Operating Head Binocular Otomicroscope   Utilizing the following: Ring curet, alligator forceps AU. The impacted cerumen of the ear canals was removed atraumatically. The TM and EAC were then inspected and found to be clear of wax. See description of TMs/EACs in PE above.   Complications: No   Condition: Improved/Good     Eyes: Conjunctivae, EOM and lids are normal. Pupils are equal, round, and reactive to light. Right eye exhibits no discharge. Left eye exhibits no discharge. No scleral icterus.   Neck: Trachea normal and normal range of motion. Neck supple. No tracheal deviation present.   Cardiovascular: Normal rate.   Pulmonary/Chest: Effort normal. No stridor. No respiratory distress. She has no wheezes.   Musculoskeletal: Normal range of motion.   Ambulates with rolling walker   Neurological: She is alert and oriented to person, place, and time. She has normal strength. Coordination and gait normal.   Skin: Skin is warm, dry and intact. No lesion and no rash noted. She is not diaphoretic. No cyanosis. No pallor.   Psychiatric: She has a normal mood and affect. Her speech is normal and behavior is normal. Judgment and thought content normal. Cognition and memory are normal.   Nursing note and vitals reviewed.    Assessment:     Cerumen impactions removed AU    Moderate/moderately-severe to profound sensorineural hearing loss AU  Impaired auditory discrimination AU  Wears hearing aids bilaterally  Plan:     Recommend continued daily use of binaural amplification    Recommend annual audiogram  Return to clinic in one year and as needed for ENT concerns

## 2018-08-16 NOTE — PROGRESS NOTES
Rebeca Turner was seen 08/16/2018 for an audiological evaluation. Patient complains of hearing loss. Pt wears a BTE on her left ear but said the right aid broke.     Results reveal a bilateral moderately severe sloping to profound sensorineural hearing loss.    Speech Reception Thresholds were  65 dBHL for the right ear and 60 dBHL for the left ear.    Word recognition scores were poor for the right ear and poor for the left ear.   Tympanograms were Type A for the right ear and Type A for the left ear with significant neg pressure AU.    Audiogram results were reviewed in detail with patient and all questions were answered. Results will be reviewed by ENT at the completion of this note. Recommend continued daily use of amplification, annual hearing tests to monitor hearing loss and hearing protection in loud noise.

## 2018-08-20 ENCOUNTER — LAB VISIT (OUTPATIENT)
Dept: LAB | Facility: HOSPITAL | Age: 78
End: 2018-08-20
Attending: NURSE PRACTITIONER
Payer: MEDICARE

## 2018-08-20 DIAGNOSIS — D50.9 IRON DEFICIENCY ANEMIA, UNSPECIFIED IRON DEFICIENCY ANEMIA TYPE: ICD-10-CM

## 2018-08-20 DIAGNOSIS — N18.30 CKD (CHRONIC KIDNEY DISEASE) STAGE 3, GFR 30-59 ML/MIN: ICD-10-CM

## 2018-08-20 DIAGNOSIS — R74.8 ELEVATED ALKALINE PHOSPHATASE LEVEL: ICD-10-CM

## 2018-08-20 LAB
ALBUMIN SERPL BCP-MCNC: 3.5 G/DL
ALP SERPL-CCNC: 164 U/L
ALT SERPL W/O P-5'-P-CCNC: 14 U/L
ANION GAP SERPL CALC-SCNC: 10 MMOL/L
AST SERPL-CCNC: 11 U/L
BASOPHILS # BLD AUTO: 0.02 K/UL
BASOPHILS NFR BLD: 0.3 %
BILIRUB SERPL-MCNC: 0.2 MG/DL
BUN SERPL-MCNC: 32 MG/DL
CALCIUM SERPL-MCNC: 10.1 MG/DL
CHLORIDE SERPL-SCNC: 101 MMOL/L
CO2 SERPL-SCNC: 33 MMOL/L
CREAT SERPL-MCNC: 1.1 MG/DL
DIFFERENTIAL METHOD: ABNORMAL
EOSINOPHIL # BLD AUTO: 0.1 K/UL
EOSINOPHIL NFR BLD: 1.3 %
ERYTHROCYTE [DISTWIDTH] IN BLOOD BY AUTOMATED COUNT: 13.3 %
EST. GFR  (AFRICAN AMERICAN): 56 ML/MIN/1.73 M^2
EST. GFR  (NON AFRICAN AMERICAN): 48 ML/MIN/1.73 M^2
GLUCOSE SERPL-MCNC: 107 MG/DL
HCT VFR BLD AUTO: 30.4 %
HGB BLD-MCNC: 9.8 G/DL
IRON SERPL-MCNC: 53 UG/DL
LYMPHOCYTES # BLD AUTO: 1 K/UL
LYMPHOCYTES NFR BLD: 16.4 %
MCH RBC QN AUTO: 30.8 PG
MCHC RBC AUTO-ENTMCNC: 32.2 G/DL
MCV RBC AUTO: 96 FL
MONOCYTES # BLD AUTO: 0.6 K/UL
MONOCYTES NFR BLD: 9.7 %
NEUTROPHILS # BLD AUTO: 4.4 K/UL
NEUTROPHILS NFR BLD: 72.3 %
PLATELET # BLD AUTO: 226 K/UL
PMV BLD AUTO: 9.6 FL
POTASSIUM SERPL-SCNC: 4.7 MMOL/L
PROT SERPL-MCNC: 6.7 G/DL
RBC # BLD AUTO: 3.18 M/UL
SATURATED IRON: 16 %
SODIUM SERPL-SCNC: 144 MMOL/L
TOTAL IRON BINDING CAPACITY: 332 UG/DL
TRANSFERRIN SERPL-MCNC: 224 MG/DL
WBC # BLD AUTO: 6.09 K/UL

## 2018-08-20 PROCEDURE — 36415 COLL VENOUS BLD VENIPUNCTURE: CPT | Mod: PO

## 2018-08-20 PROCEDURE — 85025 COMPLETE CBC W/AUTO DIFF WBC: CPT | Mod: PO

## 2018-08-20 PROCEDURE — 80053 COMPREHEN METABOLIC PANEL: CPT | Mod: PO

## 2018-08-20 PROCEDURE — 83540 ASSAY OF IRON: CPT

## 2018-08-23 ENCOUNTER — OFFICE VISIT (OUTPATIENT)
Dept: HEMATOLOGY/ONCOLOGY | Facility: CLINIC | Age: 78
End: 2018-08-23
Payer: MEDICARE

## 2018-08-23 VITALS
HEIGHT: 63 IN | SYSTOLIC BLOOD PRESSURE: 186 MMHG | BODY MASS INDEX: 31.05 KG/M2 | DIASTOLIC BLOOD PRESSURE: 76 MMHG | TEMPERATURE: 98 F | RESPIRATION RATE: 18 BRPM | WEIGHT: 175.25 LBS | HEART RATE: 56 BPM

## 2018-08-23 DIAGNOSIS — D64.9 CHRONIC ANEMIA: Primary | ICD-10-CM

## 2018-08-23 DIAGNOSIS — I10 ESSENTIAL HYPERTENSION: ICD-10-CM

## 2018-08-23 PROCEDURE — 99214 OFFICE O/P EST MOD 30 MIN: CPT | Mod: PBBFAC,PN | Performed by: NURSE PRACTITIONER

## 2018-08-23 PROCEDURE — 99213 OFFICE O/P EST LOW 20 MIN: CPT | Mod: S$PBB,,, | Performed by: NURSE PRACTITIONER

## 2018-08-23 PROCEDURE — 99999 PR PBB SHADOW E&M-EST. PATIENT-LVL IV: CPT | Mod: PBBFAC,,, | Performed by: NURSE PRACTITIONER

## 2018-08-23 NOTE — PROGRESS NOTES
HISTORY OF PRESENT ILLNESS:  This is a 78-year-old white female known to   Dr. Voss for a diagnosis regarding hypochromic normocytic anemia in association   with elevated alkaline phosphatase levels.  She presents to the clinic today with her   sitter as she resides in a group home in [facility].  She denies any fatigue, weakness,   headaches, blurred vision, melena, pica, abdominal discomfort, nausea, vomiting,   constipation, diarrhea, fevers, chills, etc.  No other new complaints or pertinent findings   on a 14-point review of systems.    PHYSICAL EXAMINATION:  GENERAL:  Well-developed, well-nourished elderly white female, mentally   challenged, in no acute distress.  Alert and oriented to person and place.  VITAL SIGNS:  Weight:  Loss of 4 Pounds in 3 months with diet  Wt Readings from Last 3 Encounters:   08/23/18 79.5 kg (175 lb 4.3 oz)   08/16/18 79.2 kg (174 lb 9.7 oz)   08/09/18 79.4 kg (175 lb 0.7 oz)     Temp Readings from Last 3 Encounters:   08/23/18 97.6 °F (36.4 °C) (Oral)   05/22/18 98 °F (36.7 °C)   02/23/18 98.2 °F (36.8 °C) (Oral)     BP Readings from Last 3 Encounters:   08/23/18 (!) 186/76   08/16/18 (!) 177/57   08/09/18 (!) 152/58     Pulse Readings from Last 3 Encounters:   08/23/18 (!) 56   08/16/18 (!) 56   08/09/18 62     HEENT:  Normocephalic, atraumatic.  Oral mucosa pink and moist.  Lips without   lesions.  Tongue midline.  Oropharynx clear.  Nonicteric sclerae.   NECK:  Supple, no adenopathy.  No carotid bruits, thyromegaly or thyroid nodule.  HEART:  Regular rate and rhythm without murmur, gallop or rub.   LUNGS:  Clear to auscultation bilaterally.  Normal respiratory effort.   ABDOMEN:  Soft, nontender, nondistended with positive normoactive bowel sounds,   no hepatosplenomegaly.  EXTREMITIES:  No cyanosis, clubbing or edema.  Distal pulses are intact.     LABORATORY:   Lab Results   Component Value Date    WBC 6.09 08/20/2018    HGB 9.8 (L) 08/20/2018    HCT 30.4 (L) 08/20/2018     MCV 96 08/20/2018     08/20/2018     Differential remarkable for 16.4% lymph  Hemoglobin remains unchanged    CMP  Sodium   Date Value Ref Range Status   08/20/2018 144 136 - 145 mmol/L Final     Potassium   Date Value Ref Range Status   08/20/2018 4.7 3.5 - 5.1 mmol/L Final     Chloride   Date Value Ref Range Status   08/20/2018 101 95 - 110 mmol/L Final     CO2   Date Value Ref Range Status   08/20/2018 33 (H) 23 - 29 mmol/L Final     Glucose   Date Value Ref Range Status   08/20/2018 107 70 - 110 mg/dL Final     BUN, Bld   Date Value Ref Range Status   08/20/2018 32 (H) 8 - 23 mg/dL Final     Creatinine   Date Value Ref Range Status   08/20/2018 1.1 0.5 - 1.4 mg/dL Final     Calcium   Date Value Ref Range Status   08/20/2018 10.1 8.7 - 10.5 mg/dL Final     Total Protein   Date Value Ref Range Status   08/20/2018 6.7 6.0 - 8.4 g/dL Final     Albumin   Date Value Ref Range Status   08/20/2018 3.5 3.5 - 5.2 g/dL Final     Total Bilirubin   Date Value Ref Range Status   08/20/2018 0.2 0.1 - 1.0 mg/dL Final     Comment:     For infants and newborns, interpretation of results should be based  on gestational age, weight and in agreement with clinical  observations.  Premature Infant recommended reference ranges:  Up to 24 hours.............<8.0 mg/dL  Up to 48 hours............<12.0 mg/dL  3-5 days..................<15.0 mg/dL  6-29 days.................<15.0 mg/dL       Alkaline Phosphatase   Date Value Ref Range Status   08/20/2018 164 (H) 55 - 135 U/L Final     AST   Date Value Ref Range Status   08/20/2018 11 10 - 40 U/L Final     ALT   Date Value Ref Range Status   08/20/2018 14 10 - 44 U/L Final     Anion Gap   Date Value Ref Range Status   08/20/2018 10 8 - 16 mmol/L Final     eGFR if    Date Value Ref Range Status   08/20/2018 56 (A) >60 mL/min/1.73 m^2 Final     eGFR if non    Date Value Ref Range Status   08/20/2018 48 (A) >60 mL/min/1.73 m^2 Final     Comment:      Calculation used to obtain the estimated glomerular filtration  rate (eGFR) is the CKD-EPI equation.        IMPRESSION:  1.  Anemia of chronic disease, stable  2.  CKD III.  3.  Fatty infiltration of the liver.  4.  Elevated alk phos - stable.  5.  HTN - follow with primary    PLAN:    1.  Discontinue; MVI with low iron i daily.  2.  Return to clinic in 6 months with interval CBC, CMP, Iron studies prior.    Assessment/plan reviewed and approved by Dr. Jose.

## 2018-08-29 ENCOUNTER — PES CALL (OUTPATIENT)
Dept: ADMINISTRATIVE | Facility: CLINIC | Age: 78
End: 2018-08-29

## 2018-10-08 ENCOUNTER — PROCEDURE VISIT (OUTPATIENT)
Dept: OPHTHALMOLOGY | Facility: CLINIC | Age: 78
End: 2018-10-08
Payer: MEDICARE

## 2018-10-08 VITALS — SYSTOLIC BLOOD PRESSURE: 162 MMHG | HEART RATE: 58 BPM | DIASTOLIC BLOOD PRESSURE: 55 MMHG

## 2018-10-08 DIAGNOSIS — H35.3222 EXUDATIVE AGE-RELATED MACULAR DEGENERATION OF LEFT EYE WITH INACTIVE CHOROIDAL NEOVASCULARIZATION: ICD-10-CM

## 2018-10-08 DIAGNOSIS — H35.3211 EXUDATIVE AGE-RELATED MACULAR DEGENERATION OF RIGHT EYE WITH ACTIVE CHOROIDAL NEOVASCULARIZATION: Primary | ICD-10-CM

## 2018-10-08 DIAGNOSIS — H43.393 VITREOUS FLOATERS OF BOTH EYES: ICD-10-CM

## 2018-10-08 PROCEDURE — 92134 CPTRZ OPH DX IMG PST SGM RTA: CPT | Mod: PBBFAC,PO | Performed by: OPHTHALMOLOGY

## 2018-10-08 PROCEDURE — 67028 INJECTION EYE DRUG: CPT | Mod: PBBFAC,PO,RT | Performed by: OPHTHALMOLOGY

## 2018-10-08 PROCEDURE — 92014 COMPRE OPH EXAM EST PT 1/>: CPT | Mod: 25,S$PBB,, | Performed by: OPHTHALMOLOGY

## 2018-10-08 PROCEDURE — 67028 INJECTION EYE DRUG: CPT | Mod: S$PBB,RT,, | Performed by: OPHTHALMOLOGY

## 2018-10-08 PROCEDURE — C9257 BEVACIZUMAB INJECTION: HCPCS | Mod: PBBFAC,JG,PO | Performed by: OPHTHALMOLOGY

## 2018-10-08 RX ORDER — PEN NEEDLE, DIABETIC, SAFETY 30 GX3/16"
NEEDLE, DISPOSABLE MISCELLANEOUS
COMMUNITY
Start: 2018-09-05 | End: 2019-10-14

## 2018-10-08 RX ADMIN — BEVACIZUMAB 1.25 MG: 100 INJECTION, SOLUTION INTRAVENOUS at 10:10

## 2018-10-08 NOTE — PATIENT INSTRUCTIONS
POST INTRAVITREAL INJECTION PATIENT INSTRUCTIONS   Below are some guidelines for what to expect after your treatment and additional care instructions.   * you may experience mild discomfort in your eye after the treatment. Your eye usually feels better within 24 to 48 hours after the procedure.   * you have been given drops that numb the surface of your eye.   DO NOT rub or touch your eye, DO NOT wear contacts until numbness goes away.   * you may experience small spots that appear in your field of vision, these are usually caused by an air bubble or from the medication. It taked a few hours or days for these to go away.   * use of sunglasses will help reduce light sensitivity and glare.   * DO NOT swim or put sink water ( tap water ) or swin for at least 24 hours after the injection   * If you have a gritty, burning, irritating or stinging feeling in the injected eye. This may be a result of the antiseptic used. Use artifical tears or eye lubricant ( from over- the- counter from your local pharmacy ). If you have some at home already please check the expiration date, so not to use anything contaminated in your eye. A cool pack over your eye brow above the injected eye may also relieve discomfort.   Call us right away or go to the Emergency Department if you have a dramatic decrease in vision or extreme pain in the eye.   [facility] [phone]

## 2018-10-08 NOTE — PROGRESS NOTES
HPI     Macular Degeneration      Additional comments: 10 wk amd chk              Comments       No gtts           OCT - OD  PED's with SRF  OS - SR Fibrosis - NO SRF      A/P    1. Wet AMD OS  S/p Avastin injections outside  Stable today with cicatrix  Observe    2. Wet AMD OD  S/p Avastin OD x 11  S/p Eylea OD x 9    Avastin OD    Alternate tx      AREDS/AG    3. DM  No significant DR  BS/BP/chol control    4. PCIOL OU      10 weeks OCT      Risks, benefits, and alternatives to treatment discussed in detail with the patient.  The patient voiced understanding and wished to proceed with the procedure    Injection Procedure Note:  Diagnosis: Wet AMD OD    Patient Identified and Time Out complete  Topical Proparacaine and Betadine.  Inject Avastin OD at 6:00 @ 3.5-4mm posterior to limbus  Post Operative Dx: Same  Complications: None  Follow up as above.

## 2018-10-16 ENCOUNTER — OFFICE VISIT (OUTPATIENT)
Dept: FAMILY MEDICINE | Facility: CLINIC | Age: 78
End: 2018-10-16
Payer: MEDICARE

## 2018-10-16 VITALS
HEART RATE: 60 BPM | HEIGHT: 63 IN | SYSTOLIC BLOOD PRESSURE: 160 MMHG | WEIGHT: 176.38 LBS | OXYGEN SATURATION: 97 % | DIASTOLIC BLOOD PRESSURE: 65 MMHG | BODY MASS INDEX: 31.25 KG/M2

## 2018-10-16 DIAGNOSIS — D64.9 CHRONIC ANEMIA: ICD-10-CM

## 2018-10-16 DIAGNOSIS — D50.9 IRON DEFICIENCY ANEMIA, UNSPECIFIED IRON DEFICIENCY ANEMIA TYPE: ICD-10-CM

## 2018-10-16 DIAGNOSIS — H35.3110 NONEXUDATIVE AGE-RELATED MACULAR DEGENERATION OF RIGHT EYE, UNSPECIFIED STAGE: ICD-10-CM

## 2018-10-16 DIAGNOSIS — H35.3211 EXUDATIVE AGE-RELATED MACULAR DEGENERATION OF RIGHT EYE WITH ACTIVE CHOROIDAL NEOVASCULARIZATION: ICD-10-CM

## 2018-10-16 DIAGNOSIS — H43.393 VITREOUS FLOATERS OF BOTH EYES: ICD-10-CM

## 2018-10-16 DIAGNOSIS — Z79.4 TYPE 2 DIABETES MELLITUS WITHOUT COMPLICATION, WITH LONG-TERM CURRENT USE OF INSULIN: ICD-10-CM

## 2018-10-16 DIAGNOSIS — H35.3222 EXUDATIVE AGE-RELATED MACULAR DEGENERATION OF LEFT EYE WITH INACTIVE CHOROIDAL NEOVASCULARIZATION: ICD-10-CM

## 2018-10-16 DIAGNOSIS — C54.1 ENDOMETRIAL ADENOCARCINOMA: ICD-10-CM

## 2018-10-16 DIAGNOSIS — N18.30 CKD (CHRONIC KIDNEY DISEASE) STAGE 3, GFR 30-59 ML/MIN: ICD-10-CM

## 2018-10-16 DIAGNOSIS — I44.1 MOBITZ (TYPE) I (WENCKEBACH'S) ATRIOVENTRICULAR BLOCK: ICD-10-CM

## 2018-10-16 DIAGNOSIS — K43.0 INCISIONAL HERNIA WITH OBSTRUCTION: ICD-10-CM

## 2018-10-16 DIAGNOSIS — R56.9 CONVULSIONS, UNSPECIFIED CONVULSION TYPE: ICD-10-CM

## 2018-10-16 DIAGNOSIS — E11.9 TYPE 2 DIABETES MELLITUS WITHOUT COMPLICATION, WITH LONG-TERM CURRENT USE OF INSULIN: ICD-10-CM

## 2018-10-16 DIAGNOSIS — I10 ESSENTIAL HYPERTENSION: ICD-10-CM

## 2018-10-16 DIAGNOSIS — I82.409 DEEP VEIN THROMBOSIS (DVT) OF LOWER EXTREMITY, UNSPECIFIED CHRONICITY, UNSPECIFIED LATERALITY, UNSPECIFIED VEIN: ICD-10-CM

## 2018-10-16 DIAGNOSIS — Z00.00 ENCOUNTER FOR PREVENTIVE HEALTH EXAMINATION: Primary | ICD-10-CM

## 2018-10-16 PROCEDURE — 99215 OFFICE O/P EST HI 40 MIN: CPT | Mod: PBBFAC,PO | Performed by: NURSE PRACTITIONER

## 2018-10-16 PROCEDURE — 99999 PR PBB SHADOW E&M-EST. PATIENT-LVL V: CPT | Mod: PBBFAC,,, | Performed by: NURSE PRACTITIONER

## 2018-10-16 PROCEDURE — G0439 PPPS, SUBSEQ VISIT: HCPCS | Mod: S$GLB,,, | Performed by: NURSE PRACTITIONER

## 2018-10-16 RX ORDER — ROSUVASTATIN CALCIUM 10 MG/1
10 TABLET, COATED ORAL DAILY
COMMUNITY

## 2018-10-16 RX ORDER — CARBAMAZEPINE 100 MG/1
100 TABLET, EXTENDED RELEASE ORAL 2 TIMES DAILY
COMMUNITY

## 2018-10-16 NOTE — PROGRESS NOTES
"Rebeca Turner presented for a  Medicare AWV and comprehensive Health Risk Assessment today. The following components were reviewed and updated:    · Medical history  · Family History  · Social history  · Allergies and Current Medications  · Health Risk Assessment  · Health Maintenance  · Care Team     ** See Completed Assessments for Annual Wellness Visit within the encounter summary.**       The following assessments were completed:  · Living Situation  · CAGE  · Depression Screening  · Timed Get Up and Go  · Whisper Test  · Cognitive Function Screening          · Nutrition Screening  · ADL Screening  · PAQ Screening    Vitals:    10/16/18 0849   BP: (!) 160/65   BP Location: Left arm   Patient Position: Sitting   BP Method: Medium (Automatic)   Pulse: 60   SpO2: 97%   Weight: 80 kg (176 lb 5.9 oz)   Height: 5' 3" (1.6 m)     Body mass index is 31.24 kg/m².  Physical Exam   Constitutional: She appears well-developed and well-nourished. No distress.   HENT:   Head: Normocephalic.   Cardiovascular: Normal heart sounds.   Pulmonary/Chest: Effort normal and breath sounds normal. No respiratory distress.   Neurological: She is alert.   Skin: Skin is warm.   Vitals reviewed.        Diagnoses and health risks identified today and associated recommendations/orders:    1. Encounter for preventive health examination  Reviewed health maintenance and provided recommendations   Recommend ppsv23, pt will f/u with JUSTICE Prado MD      2. Type 2 diabetes mellitus without complication, with long-term current use of insulin  Continue to monitor   Followed by JUSTICE Prado MD .      3. Iron deficiency anemia, unspecified iron deficiency anemia type  Continue to monitor   Followed by Caleb.      4. Endometrial adenocarcinoma  Continue to monitor   Followed by Caleb.      5. Chronic anemia  Continue to monitor   Followed by Caleb.      6. Deep vein thrombosis (DVT) of lower extremity, unspecified chronicity, unspecified " laterality, unspecified vein  Continue to monitor   Followed by Caleb.      7. Incisional hernia with obstruction  Stable.     Followed by JUSTICE Prado MD .      8. CKD (chronic kidney disease) stage 3, GFR 30-59 ml/min  Continue to monitor   Followed by JUSTICE Prado MD .      9. Mobitz (type) I (Wenckebach's) atrioventricular block  Continue to monitor   Followed by Abhilash.      10. Essential hypertension  Continue to monitor   Followed by JUSTICE Prado MD .      11. Nonexudative age-related macular degeneration of right eye, unspecified stage  Follow ophthalmology recommendations   Followed by Monse.      12. Vitreous floaters of both eyes  Stable.     Followed by Monse.      13. Exudative age-related macular degeneration of right eye with active choroidal neovascularization  Follow ophthalmology recommendations   Followed by monse.      14. Exudative age-related macular degeneration of left eye with inactive choroidal neovascularization  Follow ophthalmology recommendations   Followed by monse.      15. Convulsions, unspecified convulsion type  Stable.     Followed by Pita.        Provided Rebeca with a 5-10 year written screening schedule and personal prevention plan. Recommendations were developed using the USPSTF age appropriate recommendations. Education, counseling, and referrals were provided as needed. After Visit Summary printed and given to patient which includes a list of additional screenings\tests needed.    Follow-up in about 1 year (around 10/16/2019).    Martha Alvarez NP

## 2018-10-16 NOTE — PATIENT INSTRUCTIONS
Counseling and Referral of Other Preventative  (Italic type indicates deductible and co-insurance are waived)    Patient Name: Rebeca Turner  Today's Date: 10/16/2018    Health Maintenance       Date Due Completion Date    Fecal Occult Blood Test (FOBT)/[employer] 04/11/2017 4/11/2016    Pneumococcal (65+) (2 of 2 - PPSV23) 04/22/2017 4/22/2016    Hemoglobin A1c 07/18/2018 1/18/2018    TETANUS VACCINE 01/01/2019 1/1/2009    Foot Exam 01/05/2019 1/5/2018    Override on 2/2/2016: Done    Lipid Panel 01/18/2019 1/18/2018    Urine Microalbumin 01/18/2019 1/18/2018    Eye Exam 10/08/2019 10/8/2018    DEXA SCAN 07/07/2020 7/7/2017    Colonoscopy 10/23/2020 10/23/2015        No orders of the defined types were placed in this encounter.    The following information is provided to all patients.  This information is to help you find resources for any of the problems found today that may be affecting your health:                Living healthy guide: www.[facility].[place].gov      Understanding Diabetes: www.diabetes.org      Eating healthy: www.cdc.gov/healthyweight      CDC home safety checklist: www.cdc.gov/steadi/patient.html      Agency on Aging: www.goea.[place].gov      Alcoholics anonymous (AA): www.aa.org      Physical Activity: www.harish.nih.gov/kq3ijjc      Tobacco use: www.quitwithusla.org

## 2018-10-22 ENCOUNTER — OFFICE VISIT (OUTPATIENT)
Dept: PODIATRY | Facility: CLINIC | Age: 78
End: 2018-10-22
Payer: MEDICARE

## 2018-10-22 VITALS
SYSTOLIC BLOOD PRESSURE: 153 MMHG | RESPIRATION RATE: 20 BRPM | HEIGHT: 63 IN | WEIGHT: 176 LBS | BODY MASS INDEX: 31.18 KG/M2 | HEART RATE: 63 BPM | DIASTOLIC BLOOD PRESSURE: 63 MMHG

## 2018-10-22 DIAGNOSIS — L84 CORN OR CALLUS: ICD-10-CM

## 2018-10-22 DIAGNOSIS — B35.1 ONYCHOMYCOSIS DUE TO DERMATOPHYTE: ICD-10-CM

## 2018-10-22 DIAGNOSIS — M20.42 HAMMER TOES OF BOTH FEET: ICD-10-CM

## 2018-10-22 DIAGNOSIS — M20.41 HAMMER TOES OF BOTH FEET: ICD-10-CM

## 2018-10-22 DIAGNOSIS — E11.42 DIABETIC POLYNEUROPATHY ASSOCIATED WITH TYPE 2 DIABETES MELLITUS: Primary | ICD-10-CM

## 2018-10-22 PROCEDURE — 11057 PARNG/CUTG B9 HYPRKR LES >4: CPT | Mod: S$PBB,Q9,, | Performed by: PODIATRIST

## 2018-10-22 PROCEDURE — 99214 OFFICE O/P EST MOD 30 MIN: CPT | Mod: PBBFAC,PN | Performed by: PODIATRIST

## 2018-10-22 PROCEDURE — 11057 PARNG/CUTG B9 HYPRKR LES >4: CPT | Mod: PBBFAC,PN | Performed by: PODIATRIST

## 2018-10-22 PROCEDURE — 11721 DEBRIDE NAIL 6 OR MORE: CPT | Mod: S$PBB,59,Q9, | Performed by: PODIATRIST

## 2018-10-22 PROCEDURE — 99499 UNLISTED E&M SERVICE: CPT | Mod: S$PBB,,, | Performed by: PODIATRIST

## 2018-10-22 PROCEDURE — 99999 PR PBB SHADOW E&M-EST. PATIENT-LVL IV: CPT | Mod: PBBFAC,,, | Performed by: PODIATRIST

## 2018-10-22 PROCEDURE — 11721 DEBRIDE NAIL 6 OR MORE: CPT | Mod: 59,PBBFAC,PN | Performed by: PODIATRIST

## 2018-10-22 NOTE — PROGRESS NOTES
Patient ID: Rebeca Turner is a 78 y.o. female.    Chief Complaint: Diabetic Foot Exam (3 month follow up) and Diabetes Mellitus (PCP: Johan 10/16/18 ---- A1C: 6.5 on 1/18/18)    Rebeca is a 78 y.o. female who presents to the clinic for evaluation and treatment of diabetic feet. Rebeca has a past medical history of Anemia of chronic disease, CKD (chronic kidney disease) stage 3, GFR 30-59 ml/min, DM type 2 (diabetes mellitus, type 2), DVT (deep venous thrombosis), Dysplastic nevi, Fatty liver, GERD (gastroesophageal reflux disease), H/O esophagogastroduodenoscopy, History of endometrial cancer, HTN (hypertension), Hyperlipidemia, LVE (left ventricular enlargement), Macular degeneration, MR (mental retardation), Obesity, LUISANA (obstructive sleep apnea), Osteoporosis, S/P colonoscopy, SBO (small bowel obstruction), Seizure disorder, and Vitreous detachment. Patient relates no major problem with feet. Only complaints today consist of toenails and calluses in need of trimming.  Denies being painful with wearing shoe gear.  Regularly applies moisturizer to the calluses of bilateral foot.  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  Denies any additional pedal complaints.    PCP: JUSTICE Prado MD    Date Last Seen by PCP: 10/16/18    Hemoglobin A1C   Date Value Ref Range Status   01/18/2018 6.5 (H) 4.0 - 5.6 % Final     Comment:     According to ADA guidelines, hemoglobin A1c <7.0% represents  optimal control in non-pregnant diabetic patients. Different  metrics may apply to specific patient populations.   Standards of Medical Care in Diabetes-2016.  For the purpose of screening for the presence of diabetes:  <5.7%     Consistent with the absence of diabetes  5.7-6.4%  Consistent with increasing risk for diabetes   (prediabetes)  >or=6.5%  Consistent with diabetes  Currently, no consensus exists for use of hemoglobin A1c  for diagnosis of diabetes for children.  This Hemoglobin A1c assay has  significant interference with fetal   hemoglobin   (HbF). The results are invalid for patients with abnormal amounts of   HbF,   including those with known Hereditary Persistence   of Fetal Hemoglobin. Heterozygous hemoglobin variants (HbAS, HbAC,   HbAD, HbAE, HbA2) do not significantly interfere with this assay;   however, presence of multiple variants in a sample may impact the %   interference.     11/02/2016 6.9 (H) 4.5 - 6.2 % Final     Comment:     According to ADA guidelines, hemoglobin A1C <7.0% represents  optimal control in non-pregnant diabetic patients.  Different  metrics may apply to specific populations.   Standards of Medical Care in Diabetes - 2016.  For the purpose of screening for the presence of diabetes:  <5.7%     Consistent with the absence of diabetes  5.7-6.4%  Consistent with increasing risk for diabetes   (prediabetes)  >or=6.5%  Consistent with diabetes  Currently no consensus exists for use of hemoglobin A1C  for diagnosis of diabetes for children.     08/23/2016 6.2 4.5 - 6.2 % Final     Comment:     According to ADA guidelines, hemoglobin A1C <7.0% represents  optimal control in non-pregnant diabetic patients.  Different  metrics may apply to specific populations.   Standards of Medical Care in Diabetes - 2016.  For the purpose of screening for the presence of diabetes:  <5.7%     Consistent with the absence of diabetes  5.7-6.4%  Consistent with increasing risk for diabetes   (prediabetes)  >or=6.5%  Consistent with diabetes  Currently no consensus exists for use of hemoglobin A1C  for diagnosis of diabetes for children.             Past Medical History:   Diagnosis Date    Anemia of chronic disease     hematology su neg    CKD (chronic kidney disease) stage 3, GFR 30-59 ml/min     DM type 2 (diabetes mellitus, type 2)     DVT (deep venous thrombosis)     x 2 both post-op with last 6/16    Dysplastic nevi     h/o    Fatty liver     noted on usg 9/15    GERD (gastroesophageal  reflux disease)     H/O esophagogastroduodenoscopy     normal 11/10    History of endometrial cancer     stage 1;  s/p SENDY with BSO 10/06    HTN (hypertension)     Hyperlipidemia     LVE (left ventricular enlargement)     noted on 6/15 ECHO    Macular degeneration     exudative    MR (mental retardation)     Obesity     LUISANA (obstructive sleep apnea)     su in progress    Osteoporosis     osteopenia noted on 2/13 and 7/15 dexa    S/P colonoscopy     last 10/15;  next 10/19    SBO (small bowel obstruction)     h/o recurrent SBO;  s/p R hemicolectomy 12/10 and s/p incarcerated hernia repair with extensive scar tissue 6/16    Seizure disorder     Vitreous detachment        Past Surgical History:   Procedure Laterality Date    ABDOMINAL SURGERY  06/2016    Incarcerated incisional hernia, enterotomy x3, and extensive adhesions      CATARACT EXTRACTION      B 6/11    CHOLECYSTECTOMY      COLON SURGERY  12/6/2010  (Ms Dinorah.)    R hemicolectomy 12/10 due to recurrent SBO from benign colon mass 12/2010.   Benign albeit large polyp.    COLONOSCOPY N/A 10/7/2015    Procedure: COLONOSCOPY;  Surgeon: Will Cr Jr., MD;  Location: [facility];  Service: Endoscopy;  Laterality: N/A;    COLONOSCOPY N/A 10/7/2015    Performed by Will Cr Jr., MD at [facility]    COLONOSCOPY W/ POLYPECTOMY  11/22/2010  Ms Dinorah.    3 cm growth mid ascending colon.  TUBULOVILLOUS ADENOMA. 9 mm polyp mid transverse colon.  TUBULAR ADENOMA.  10 mm polyp distal descending colon.  TUBULOVILLOUS ADENOMA.     ESOPHAGOGASTRODUODENOSCOPY  11/16/2010    Normal EGD.    HERNIA REPAIR  8/2015    from incarcerated hernia    HYSTERECTOMY  10/17/2006    SENDY and BSO 10/06 for stage 1 endometrial cancer    MYRINGOTOMY W/ TUBES      bilateral    REPAIR-HERNIA-INCISIONAL  6/2/2016    [facility]    REPAIR-HERNIA-LAPAROSCOPIC-INCISIONAL N/A 6/2/2016    [facility]    TONSILLECTOMY         Family History   Problem Relation Age of Onset    Cancer Sister         details unknown    Heart attack Father     Pulmonary embolism Mother        Social History     Socioeconomic History    Marital status: Single     Spouse name: None    Number of children: None    Years of education: None    Highest education level: None   Social Needs    Financial resource strain: None    Food insecurity - worry: None    Food insecurity - inability: None    Transportation needs - medical: None    Transportation needs - non-medical: None   Occupational History    None   Tobacco Use    Smoking status: Never Smoker    Smokeless tobacco: Never Used   Substance and Sexual Activity    Alcohol use: No     Alcohol/week: 0.0 oz    Drug use: No    Sexual activity: No   Other Topics Concern    None   Social History Narrative    Lives at [facility].       Current Outpatient Prescriptions   Medication Sig Dispense Refill    alendronate (FOSAMAX) 70 MG tablet TAKE ONE TABLET WEEKLY AS DIRECTED ON THURSDAY 4 tablet 0    calcium carbonate-vitamin D3 (CALCIUM 600 + D,3,) 600 mg calcium- 200 unit Cap Take 1 capsule by mouth 2 (two) times daily.      CRESTOR 10 mg tablet TAKE ONE TABLET BY MOUTH EVERY NIGHT AT BEDTIME 30 tablet 0    diltiaZEM (CARDIZEM CD) 360 MG 24 hr capsule       dimethicone-kelvin-A-C-E-aloe (GOLD BOND ULTIMATE DIABETICS') 3-30 % Crea Apply topically 2 (two) times daily. APPLY TO FEET      docusate sodium (COLACE) 100 MG capsule Take 100 mg by mouth 2 (two) times daily.      fish oil-omega-3 fatty acids 300-1,000 mg capsule Take 2 g by mouth once daily.      hydrALAZINE (APRESOLINE) 50 MG tablet Take 50 mg by mouth every 12 (twelve) hours.      hydroCHLOROthiazide (HYDRODIURIL) 25 MG tablet       iron-vitamin C 100-250 mg, ICAR-C, 100-250 mg Tab Take one tablet in the morning with an acidic drink before breakfast. 90 tablet 0    labetalol (NORMODYNE) 300 MG tablet TAKE ONE  TABLET BY MOUTH TWICE DAILY FOR BLOOD PRESSURE 60 tablet 0    LANTUS SOLOSTAR 100 unit/mL (3 mL) InPn pen Inject 35 Units into the skin every evening.       lisinopril (PRINIVIL,ZESTRIL) 20 MG tablet       lutein 20 mg Cap TAKE ONE SOFTGEL BY MOUTH EVERY MORNING 30 each 0    multivitamin capsule Take 1 capsule by mouth once daily.      polyethylene glycol (GLYCOLAX) 17 gram/dose powder       TEGRETOL  mg 12 hr tablet TAKE ONE TABLET BY MOUTH ONCE DAILY SEIZURES (Patient taking differently: one tablet bid) 30 tablet 0     No current facility-administered medications for this visit.        Review of patient's allergies indicates:  No Known Allergies      Review of Systems   Constitution: Negative for chills and fever.   Cardiovascular: Negative for claudication and leg swelling.   Skin: Positive for color change, nail changes and suspicious lesions.   Musculoskeletal: Negative for arthritis, joint pain and joint swelling.   Gastrointestinal: Negative for nausea and vomiting.   Neurological: Positive for numbness. Negative for paresthesias.   Psychiatric/Behavioral: Negative for altered mental status.           Objective:      Physical Exam   Constitutional: She is oriented to person, place, and time. She appears well-developed and well-nourished. No distress.   Cardiovascular:   Pulses:       Dorsalis pedis pulses are 2+ on the right side, and 2+ on the left side.        Posterior tibial pulses are 1+ on the right side, and 1+ on the left side.   CFT <3 seconds bilateral.  Pedal hair growth decreased bilateral.  Varicosities noted to bilateral lower extremity.  Mild nonpitting edema noted to bilateral lower extremity.  Toes are cool to touch bilateral.     Musculoskeletal: She exhibits edema. She exhibits no tenderness.   Muscle strength 5/5 in all muscle groups bilateral.  No tenderness nor crepitation with ROM of foot/ankle joints bilateral.  No tenderness with palpation of bilateral foot and ankle.   Bilateral pes planus foot type.  Bilateral semi-rigid contracture of toes 2-5.   Neurological: She is alert and oriented to person, place, and time. She has normal strength. A sensory deficit is present.   Protective sensation per Geronimo-Julianne monofilament decreased bilateral.    Vibratory sensation decreased bilateral.    Light touch intact bilateral.   Skin: Skin is warm, dry and intact. Capillary refill takes less than 2 seconds. Lesion noted. No abrasion, no bruising, no burn, no ecchymosis, no laceration, no petechiae and no rash noted. She is not diaphoretic. No cyanosis or erythema. No pallor. Nails show no clubbing.   Pedal skin appears thin and atrophic bilateral.  Toenails x 10 appear thickened by 2 mm's, elongated by 4 mm's, and discolored with subungual debris.  Hyperkeratotic lesion noted to bilateral sub 1st, 2nd, and 3rd metatarsal heads.  Also, noted to the tips of toes 2-3 bilateral.   Multiple nevi noted to the dorsum of the Lt. Foot. No break in the adjacent skin integrity.              Assessment:       Encounter Diagnoses   Name Primary?    Diabetic polyneuropathy associated with type 2 diabetes mellitus Yes    Hammer toes of both feet     Corn or callus     Onychomycosis due to dermatophyte          Plan:       Rebeca was seen today for diabetic foot exam and diabetes mellitus.    Diagnoses and all orders for this visit:    Diabetic polyneuropathy associated with type 2 diabetes mellitus    Hammer toes of both feet    Corn or callus    Onychomycosis due to dermatophyte      I counseled the patient on her conditions, their implications and medical management.    Advised to continue wearing shoe gear that accommodates for digital deformities.    Shoe inspection. Diabetic Foot Education. Patient reminded of the importance of good nutrition and blood sugar control to help prevent podiatric complications of diabetes. Patient instructed on proper foot hygeine. We discussed wearing proper shoe  gear, daily foot inspections, never walking without protective shoe gear, never putting sharp instruments to feet    With patient's permission, nails were aggressively reduced and debrided x 10 to their soft tissue attachment mechanically and with electric , removing all offending nail and debris.  Also, a sterile #15 scalpel was used to trim lesions x 10 down to smooth appearing skin without incident.  Patient relates relief following the procedure. She will continue to monitor the areas daily, inspect her feet, wear protective shoe gear when ambulatory, moisturizer to maintain skin integrity and follow in this office in approximately 2-3 months, sooner p.r.n.    Follow-up in about 3 months (around 1/22/2019).    Sae Snowden DPM

## 2018-11-13 ENCOUNTER — LAB VISIT (OUTPATIENT)
Dept: LAB | Facility: HOSPITAL | Age: 78
End: 2018-11-13
Attending: PSYCHIATRY & NEUROLOGY
Payer: MEDICARE

## 2018-11-13 ENCOUNTER — OFFICE VISIT (OUTPATIENT)
Dept: NEUROLOGY | Facility: CLINIC | Age: 78
End: 2018-11-13
Payer: MEDICARE

## 2018-11-13 VITALS
WEIGHT: 178 LBS | SYSTOLIC BLOOD PRESSURE: 165 MMHG | HEART RATE: 62 BPM | BODY MASS INDEX: 31.53 KG/M2 | DIASTOLIC BLOOD PRESSURE: 72 MMHG

## 2018-11-13 DIAGNOSIS — Z79.899 HIGH RISK MEDICATION USE: ICD-10-CM

## 2018-11-13 DIAGNOSIS — I10 ESSENTIAL HYPERTENSION: ICD-10-CM

## 2018-11-13 DIAGNOSIS — G40.909 SEIZURE DISORDER: Primary | ICD-10-CM

## 2018-11-13 DIAGNOSIS — G40.909 SEIZURE DISORDER: ICD-10-CM

## 2018-11-13 DIAGNOSIS — M85.80 OSTEOPENIA, UNSPECIFIED LOCATION: ICD-10-CM

## 2018-11-13 LAB
ALBUMIN SERPL BCP-MCNC: 3.3 G/DL
ALP SERPL-CCNC: 156 U/L
ALT SERPL W/O P-5'-P-CCNC: 16 U/L
ANION GAP SERPL CALC-SCNC: 8 MMOL/L
AST SERPL-CCNC: 14 U/L
BASOPHILS # BLD AUTO: 0.02 K/UL
BASOPHILS NFR BLD: 0.3 %
BILIRUB SERPL-MCNC: 0.2 MG/DL
BUN SERPL-MCNC: 24 MG/DL
CALCIUM SERPL-MCNC: 9.8 MG/DL
CARBAMAZEPINE SERPL-MCNC: 5.1 UG/ML
CHLORIDE SERPL-SCNC: 103 MMOL/L
CO2 SERPL-SCNC: 32 MMOL/L
CREAT SERPL-MCNC: 0.8 MG/DL
DIFFERENTIAL METHOD: ABNORMAL
EOSINOPHIL # BLD AUTO: 0.1 K/UL
EOSINOPHIL NFR BLD: 1.3 %
ERYTHROCYTE [DISTWIDTH] IN BLOOD BY AUTOMATED COUNT: 13 %
EST. GFR  (AFRICAN AMERICAN): >60 ML/MIN/1.73 M^2
EST. GFR  (NON AFRICAN AMERICAN): >60 ML/MIN/1.73 M^2
GLUCOSE SERPL-MCNC: 88 MG/DL
HCT VFR BLD AUTO: 32 %
HGB BLD-MCNC: 9.9 G/DL
IMM GRANULOCYTES # BLD AUTO: 0.01 K/UL
IMM GRANULOCYTES NFR BLD AUTO: 0.1 %
LYMPHOCYTES # BLD AUTO: 1 K/UL
LYMPHOCYTES NFR BLD: 14.2 %
MCH RBC QN AUTO: 30.7 PG
MCHC RBC AUTO-ENTMCNC: 30.9 G/DL
MCV RBC AUTO: 99 FL
MONOCYTES # BLD AUTO: 0.6 K/UL
MONOCYTES NFR BLD: 9.3 %
NEUTROPHILS # BLD AUTO: 5.1 K/UL
NEUTROPHILS NFR BLD: 74.8 %
NRBC BLD-RTO: 0 /100 WBC
PLATELET # BLD AUTO: 240 K/UL
PMV BLD AUTO: 10.8 FL
POTASSIUM SERPL-SCNC: 4 MMOL/L
PROT SERPL-MCNC: 6.8 G/DL
RBC # BLD AUTO: 3.22 M/UL
SODIUM SERPL-SCNC: 143 MMOL/L
WBC # BLD AUTO: 6.76 K/UL

## 2018-11-13 PROCEDURE — 99213 OFFICE O/P EST LOW 20 MIN: CPT | Mod: PBBFAC,PN | Performed by: PSYCHIATRY & NEUROLOGY

## 2018-11-13 PROCEDURE — 99214 OFFICE O/P EST MOD 30 MIN: CPT | Mod: S$PBB,,, | Performed by: PSYCHIATRY & NEUROLOGY

## 2018-11-13 PROCEDURE — 80156 ASSAY CARBAMAZEPINE TOTAL: CPT

## 2018-11-13 PROCEDURE — 85025 COMPLETE CBC W/AUTO DIFF WBC: CPT

## 2018-11-13 PROCEDURE — 99999 PR PBB SHADOW E&M-EST. PATIENT-LVL III: CPT | Mod: PBBFAC,,, | Performed by: PSYCHIATRY & NEUROLOGY

## 2018-11-13 PROCEDURE — 36415 COLL VENOUS BLD VENIPUNCTURE: CPT | Mod: PO

## 2018-11-13 PROCEDURE — 80053 COMPREHEN METABOLIC PANEL: CPT
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
## 2018-12-13 ENCOUNTER — TELEPHONE (OUTPATIENT)
Dept: OPHTHALMOLOGY | Facility: CLINIC | Age: 78
End: 2018-12-13

## 2018-12-13 NOTE — TELEPHONE ENCOUNTER
----- Message from Sadaf Yoo sent at 12/13/2018  3:45 PM CST -----  Type: Needs Medical Advice    Who Called:  Joleen Aceves/Gricel  Best Call Back Number: [phone]  Additional Information: Needs to reschedule her procedure from 12/17/18 or to find out if she does need to reschedule it. Please call to advise.

## 2018-12-14 ENCOUNTER — LAB VISIT (OUTPATIENT)
Dept: LAB | Facility: HOSPITAL | Age: 78
End: 2018-12-14
Attending: INTERNAL MEDICINE
Payer: MEDICARE

## 2018-12-14 DIAGNOSIS — Z97.4 HEARING AID WORN: Primary | ICD-10-CM

## 2018-12-14 DIAGNOSIS — O24.414 GESTATIONAL DIABETES REQUIRING INSULIN: ICD-10-CM

## 2018-12-14 LAB
ESTIMATED AVG GLUCOSE: 131 MG/DL
HBA1C MFR BLD HPLC: 6.2 %

## 2018-12-14 PROCEDURE — 83036 HEMOGLOBIN GLYCOSYLATED A1C: CPT

## 2018-12-14 PROCEDURE — 36415 COLL VENOUS BLD VENIPUNCTURE: CPT | Mod: PO

## 2018-12-18 ENCOUNTER — HOSPITAL ENCOUNTER (OUTPATIENT)
Dept: RADIOLOGY | Facility: HOSPITAL | Age: 78
Discharge: HOME | End: 2018-12-18
Attending: INTERNAL MEDICINE
Payer: MEDICARE

## 2018-12-18 DIAGNOSIS — R92.8 ABNORMAL MAMMOGRAM: ICD-10-CM

## 2018-12-18 PROCEDURE — 76642 ULTRASOUND BREAST LIMITED: CPT | Mod: 26,RT,, | Performed by: RADIOLOGY

## 2018-12-18 PROCEDURE — 76642 ULTRASOUND BREAST LIMITED: CPT | Mod: TC,PO,RT

## 2018-12-18 PROCEDURE — 77066 DX MAMMO INCL CAD BI: CPT | Mod: TC,PO

## 2018-12-18 PROCEDURE — 77066 DX MAMMO INCL CAD BI: CPT | Mod: 26,,, | Performed by: RADIOLOGY

## 2018-12-18 PROCEDURE — 77062 BREAST TOMOSYNTHESIS BI: CPT | Mod: 26,,, | Performed by: RADIOLOGY

## 2018-12-27 ENCOUNTER — LAB VISIT (OUTPATIENT)
Dept: LAB | Facility: HOSPITAL | Age: 78
End: 2018-12-27
Attending: INTERNAL MEDICINE
Payer: MEDICARE

## 2018-12-27 DIAGNOSIS — G47.33 OBSTRUCTIVE SLEEP APNEA (ADULT) (PEDIATRIC): ICD-10-CM

## 2018-12-27 DIAGNOSIS — E78.00 PURE HYPERCHOLESTEROLEMIA: ICD-10-CM

## 2018-12-27 DIAGNOSIS — E10.9 DIABETES MELLITUS TYPE I: Primary | ICD-10-CM

## 2018-12-27 DIAGNOSIS — D64.9 ANEMIA, UNSPECIFIED: ICD-10-CM

## 2018-12-27 LAB
ALBUMIN SERPL BCP-MCNC: 3.3 G/DL
ALP SERPL-CCNC: 164 U/L
ALT SERPL W/O P-5'-P-CCNC: 18 U/L
ANION GAP SERPL CALC-SCNC: 9 MMOL/L
AST SERPL-CCNC: 14 U/L
BASOPHILS # BLD AUTO: 0.02 K/UL
BASOPHILS NFR BLD: 0.3 %
BILIRUB SERPL-MCNC: 0.2 MG/DL
BUN SERPL-MCNC: 27 MG/DL
CALCIUM SERPL-MCNC: 9.5 MG/DL
CHLORIDE SERPL-SCNC: 103 MMOL/L
CHOLEST SERPL-MCNC: 113 MG/DL
CHOLEST/HDLC SERPL: 3.2 {RATIO}
CO2 SERPL-SCNC: 30 MMOL/L
CREAT SERPL-MCNC: 1 MG/DL
DIFFERENTIAL METHOD: ABNORMAL
EOSINOPHIL # BLD AUTO: 0.1 K/UL
EOSINOPHIL NFR BLD: 2.1 %
ERYTHROCYTE [DISTWIDTH] IN BLOOD BY AUTOMATED COUNT: 13.2 %
EST. GFR  (AFRICAN AMERICAN): >60 ML/MIN/1.73 M^2
EST. GFR  (NON AFRICAN AMERICAN): 54.1 ML/MIN/1.73 M^2
GLUCOSE SERPL-MCNC: 155 MG/DL
HCT VFR BLD AUTO: 31.3 %
HDLC SERPL-MCNC: 35 MG/DL
HDLC SERPL: 31 %
HGB BLD-MCNC: 9.7 G/DL
IMM GRANULOCYTES # BLD AUTO: 0.02 K/UL
IMM GRANULOCYTES NFR BLD AUTO: 0.3 %
LDLC SERPL CALC-MCNC: 35.2 MG/DL
LYMPHOCYTES # BLD AUTO: 1 K/UL
LYMPHOCYTES NFR BLD: 16.5 %
MCH RBC QN AUTO: 29 PG
MCHC RBC AUTO-ENTMCNC: 31 G/DL
MCV RBC AUTO: 94 FL
MONOCYTES # BLD AUTO: 0.5 K/UL
MONOCYTES NFR BLD: 9.3 %
NEUTROPHILS # BLD AUTO: 4.2 K/UL
NEUTROPHILS NFR BLD: 71.5 %
NONHDLC SERPL-MCNC: 78 MG/DL
NRBC BLD-RTO: 0 /100 WBC
PLATELET # BLD AUTO: 228 K/UL
PMV BLD AUTO: 10.4 FL
POTASSIUM SERPL-SCNC: 3.9 MMOL/L
PROT SERPL-MCNC: 6.7 G/DL
RBC # BLD AUTO: 3.34 M/UL
SODIUM SERPL-SCNC: 142 MMOL/L
TRIGL SERPL-MCNC: 214 MG/DL
TSH SERPL DL<=0.005 MIU/L-ACNC: 2.59 UIU/ML
WBC # BLD AUTO: 5.81 K/UL

## 2018-12-27 PROCEDURE — 84443 ASSAY THYROID STIM HORMONE: CPT

## 2018-12-27 PROCEDURE — 80061 LIPID PANEL: CPT

## 2018-12-27 PROCEDURE — 85025 COMPLETE CBC W/AUTO DIFF WBC: CPT

## 2018-12-27 PROCEDURE — 80053 COMPREHEN METABOLIC PANEL: CPT

## 2018-12-27 PROCEDURE — 36415 COLL VENOUS BLD VENIPUNCTURE: CPT | Mod: PO

## 2019-01-07 ENCOUNTER — OFFICE VISIT (OUTPATIENT)
Dept: OPHTHALMOLOGY | Facility: CLINIC | Age: 79
End: 2019-01-07
Payer: MEDICARE

## 2019-01-07 VITALS — SYSTOLIC BLOOD PRESSURE: 167 MMHG | HEART RATE: 62 BPM | DIASTOLIC BLOOD PRESSURE: 58 MMHG

## 2019-01-07 DIAGNOSIS — H35.3211 EXUDATIVE AGE-RELATED MACULAR DEGENERATION OF RIGHT EYE WITH ACTIVE CHOROIDAL NEOVASCULARIZATION: Primary | ICD-10-CM

## 2019-01-07 DIAGNOSIS — H43.393 VITREOUS FLOATERS OF BOTH EYES: ICD-10-CM

## 2019-01-07 DIAGNOSIS — H35.3222 EXUDATIVE AGE-RELATED MACULAR DEGENERATION OF LEFT EYE WITH INACTIVE CHOROIDAL NEOVASCULARIZATION: ICD-10-CM

## 2019-01-07 PROCEDURE — 92134 CPTRZ OPH DX IMG PST SGM RTA: CPT | Mod: PBBFAC,PO | Performed by: OPHTHALMOLOGY

## 2019-01-07 PROCEDURE — 99999 PR PBB SHADOW E&M-EST. PATIENT-LVL III: CPT | Mod: PBBFAC,,, | Performed by: OPHTHALMOLOGY

## 2019-01-07 PROCEDURE — 99213 OFFICE O/P EST LOW 20 MIN: CPT | Mod: PBBFAC,PO,25 | Performed by: OPHTHALMOLOGY

## 2019-01-07 PROCEDURE — 92014 COMPRE OPH EXAM EST PT 1/>: CPT | Mod: 25,S$PBB,, | Performed by: OPHTHALMOLOGY

## 2019-01-07 PROCEDURE — 92014 PR EYE EXAM, EST PATIENT,COMPREHESV: ICD-10-PCS | Mod: 25,S$PBB,, | Performed by: OPHTHALMOLOGY

## 2019-01-07 PROCEDURE — 67028 INJECTION EYE DRUG: CPT | Mod: PBBFAC,PO,RT | Performed by: OPHTHALMOLOGY

## 2019-01-07 PROCEDURE — 99999 PR PBB SHADOW E&M-EST. PATIENT-LVL III: ICD-10-PCS | Mod: PBBFAC,,, | Performed by: OPHTHALMOLOGY

## 2019-01-07 PROCEDURE — 67028 PR INJECT INTRAVITREAL PHARMCOLOGIC: ICD-10-PCS | Mod: S$PBB,RT,, | Performed by: OPHTHALMOLOGY

## 2019-01-07 PROCEDURE — 67028 INJECTION EYE DRUG: CPT | Mod: S$PBB,RT,, | Performed by: OPHTHALMOLOGY

## 2019-01-07 PROCEDURE — 92134 POSTERIOR SEGMENT OCT RETINA (OCULAR COHERENCE TOMOGRAPHY)-BOTH EYES: ICD-10-PCS | Mod: 26,S$PBB,, | Performed by: OPHTHALMOLOGY

## 2019-01-07 RX ADMIN — AFLIBERCEPT 2 MG: 40 INJECTION, SOLUTION INTRAVITREAL at 11:01

## 2019-01-07 NOTE — PROGRESS NOTES
HPI     Eye Problem      Additional comments: overdue 10 week check              Comments     DLS 10/8/18 - No changes x last visit    No gtts     OCT - OD  PED's with SRF  OS - SR Fibrosis - NO SRF      A/P    1. Wet AMD OS  S/p Avastin injections outside  Stable today with cicatrix  Observe    2. Wet AMD OD  S/p Avastin OD x 12  S/p Eylea OD x 9    Eylea OD    Alternate tx      AREDS/AG    3. DM  No significant DR  BS/BP/chol control    4. PCIOL OU      9-10 weeks OCT      Risks, benefits, and alternatives to treatment discussed in detail with the patient.  The patient voiced understanding and wished to proceed with the procedure    Injection Procedure Note:  Diagnosis: Wet AMD OD    Patient Identified and Time Out complete  Topical Proparacaine and Betadine.  Inject Eylea OD at 6:00 @ 3.5-4mm posterior to limbus  Post Operative Dx: Same  Complications: None  Follow up as above.

## 2019-01-11 NOTE — PROGRESS NOTES
HPI     Eye Problem    Additional comments: 11 week check           Comments   DLS 3/5/18- No changes x last visit - stable vision        OCT - OD  PED's with SRF  OS - SR Fibrosis - NO SRF      A/P    1. Wet AMD OS  S/p Avastin injections outside  Stable today with cicatrix  Observe    2. Wet AMD OD  S/p Avastin OD x 10  S/p Eylea OD x 8    Avastin OD    Alternate tx      AREDS/AG    3. DM  No significant DR  BS/BP/chol control    4. PCIOL OU      10-11 weeks OCT      Risks, benefits, and alternatives to treatment discussed in detail with the patient.  The patient voiced understanding and wished to proceed with the procedure    Injection Procedure Note:  Diagnosis: Wet AMD OD    Topical Proparacaine and Betadine.  Injec Avastin OD at 6:00 @ 3.5-4mm posterior to limbus  Post Operative Dx: Same  Complications: None  Follow up as above.   no

## 2019-01-15 ENCOUNTER — OFFICE VISIT (OUTPATIENT)
Dept: PODIATRY | Facility: CLINIC | Age: 79
End: 2019-01-15
Payer: MEDICARE

## 2019-01-15 VITALS — HEIGHT: 63 IN | BODY MASS INDEX: 31.53 KG/M2 | WEIGHT: 177.94 LBS

## 2019-01-15 DIAGNOSIS — E11.42 DIABETIC POLYNEUROPATHY ASSOCIATED WITH TYPE 2 DIABETES MELLITUS: Primary | ICD-10-CM

## 2019-01-15 DIAGNOSIS — M20.41 HAMMER TOES OF BOTH FEET: ICD-10-CM

## 2019-01-15 DIAGNOSIS — L84 CORN OR CALLUS: ICD-10-CM

## 2019-01-15 DIAGNOSIS — B35.1 ONYCHOMYCOSIS DUE TO DERMATOPHYTE: ICD-10-CM

## 2019-01-15 DIAGNOSIS — M20.42 HAMMER TOES OF BOTH FEET: ICD-10-CM

## 2019-01-15 PROCEDURE — 11721 PR DEBRIDEMENT OF NAILS, 6 OR MORE: ICD-10-PCS | Mod: S$PBB,59,Q9, | Performed by: PODIATRIST

## 2019-01-15 PROCEDURE — 99212 OFFICE O/P EST SF 10 MIN: CPT | Mod: PBBFAC,PN,25 | Performed by: PODIATRIST

## 2019-01-15 PROCEDURE — 99213 PR OFFICE/OUTPT VISIT, EST, LEVL III, 20-29 MIN: ICD-10-PCS | Mod: 25,S$PBB,, | Performed by: PODIATRIST

## 2019-01-15 PROCEDURE — 99213 OFFICE O/P EST LOW 20 MIN: CPT | Mod: 25,S$PBB,, | Performed by: PODIATRIST

## 2019-01-15 PROCEDURE — 11057 PR TRIM BENIGN HYPERKERATOTIC SKIN LESION,>4: ICD-10-PCS | Mod: S$PBB,Q9,, | Performed by: PODIATRIST

## 2019-01-15 PROCEDURE — 99999 PR PBB SHADOW E&M-EST. PATIENT-LVL II: ICD-10-PCS | Mod: PBBFAC,,, | Performed by: PODIATRIST

## 2019-01-15 PROCEDURE — 11721 DEBRIDE NAIL 6 OR MORE: CPT | Mod: PBBFAC,PN | Performed by: PODIATRIST

## 2019-01-15 PROCEDURE — 11057 PARNG/CUTG B9 HYPRKR LES >4: CPT | Mod: PBBFAC,PN | Performed by: PODIATRIST

## 2019-01-15 PROCEDURE — 11057 PARNG/CUTG B9 HYPRKR LES >4: CPT | Mod: S$PBB,Q9,, | Performed by: PODIATRIST

## 2019-01-15 PROCEDURE — 11721 DEBRIDE NAIL 6 OR MORE: CPT | Mod: S$PBB,59,Q9, | Performed by: PODIATRIST

## 2019-01-15 PROCEDURE — 99999 PR PBB SHADOW E&M-EST. PATIENT-LVL II: CPT | Mod: PBBFAC,,, | Performed by: PODIATRIST

## 2019-01-15 NOTE — PROGRESS NOTES
Patient ID: Rebeca Turner is a 78 y.o. female.    Chief Complaint: Diabetes Mellitus (Johan 12/14/18 a1c 6.2 12/14/18) and Diabetic Foot Exam    Rebeca is a 78 y.o. female who presents to the clinic for evaluation and treatment of diabetic feet. Rebeca has a past medical history of Anemia of chronic disease, CKD (chronic kidney disease) stage 3, GFR 30-59 ml/min, DM type 2 (diabetes mellitus, type 2), DVT (deep venous thrombosis), Dysplastic nevi, Fatty liver, GERD (gastroesophageal reflux disease), H/O esophagogastroduodenoscopy, History of endometrial cancer, HTN (hypertension), Hyperlipidemia, LVE (left ventricular enlargement), Macular degeneration, MR (mental retardation), Obesity, LUISANA (obstructive sleep apnea), Osteoporosis, S/P colonoscopy, SBO (small bowel obstruction), Seizure disorder, and Vitreous detachment. Patient relates no major problem with feet. Only complaints today consist of toenails and calluses in need of trimming.  Denies being painful with wearing shoe gear.  Regularly applies moisturizer to the calluses of bilateral foot.  Blood glucose continues to stay in the lower six range.  Denies any additional pedal complaints.    PCP: JUSTICE Prado MD    Date Last Seen by PCP: 12/14/18    Hemoglobin A1C   Date Value Ref Range Status   12/14/2018 6.2 (H) 4.0 - 5.6 % Final     Comment:     ADA Screening Guidelines:  5.7-6.4%  Consistent with prediabetes  >or=6.5%  Consistent with diabetes  High levels of fetal hemoglobin interfere with the HbA1C  assay. Heterozygous hemoglobin variants (HbS, HgC, etc)do  not significantly interfere with this assay.   However, presence of multiple variants may affect accuracy.     01/18/2018 6.5 (H) 4.0 - 5.6 % Final     Comment:     According to ADA guidelines, hemoglobin A1c <7.0% represents  optimal control in non-pregnant diabetic patients. Different  metrics may apply to specific patient populations.   Standards of Medical Care in Diabetes-2016.  For the  purpose of screening for the presence of diabetes:  <5.7%     Consistent with the absence of diabetes  5.7-6.4%  Consistent with increasing risk for diabetes   (prediabetes)  >or=6.5%  Consistent with diabetes  Currently, no consensus exists for use of hemoglobin A1c  for diagnosis of diabetes for children.  This Hemoglobin A1c assay has significant interference with fetal   hemoglobin   (HbF). The results are invalid for patients with abnormal amounts of   HbF,   including those with known Hereditary Persistence   of Fetal Hemoglobin. Heterozygous hemoglobin variants (HbAS, HbAC,   HbAD, HbAE, HbA2) do not significantly interfere with this assay;   however, presence of multiple variants in a sample may impact the %   interference.     11/02/2016 6.9 (H) 4.5 - 6.2 % Final     Comment:     According to ADA guidelines, hemoglobin A1C <7.0% represents  optimal control in non-pregnant diabetic patients.  Different  metrics may apply to specific populations.   Standards of Medical Care in Diabetes - 2016.  For the purpose of screening for the presence of diabetes:  <5.7%     Consistent with the absence of diabetes  5.7-6.4%  Consistent with increasing risk for diabetes   (prediabetes)  >or=6.5%  Consistent with diabetes  Currently no consensus exists for use of hemoglobin A1C  for diagnosis of diabetes for children.             Past Medical History:   Diagnosis Date    Anemia of chronic disease     hematology su neg    CKD (chronic kidney disease) stage 3, GFR 30-59 ml/min     DM type 2 (diabetes mellitus, type 2)     DVT (deep venous thrombosis)     x 2 both post-op with last 6/16    Dysplastic nevi     h/o    Fatty liver     noted on usg 9/15    GERD (gastroesophageal reflux disease)     H/O esophagogastroduodenoscopy     normal 11/10    History of endometrial cancer     stage 1;  s/p SENDY with BSO 10/06    HTN (hypertension)     Hyperlipidemia     LVE (left ventricular enlargement)     noted on 6/15 ECHO     Macular degeneration     exudative    MR (mental retardation)     Obesity     LUISANA (obstructive sleep apnea)     su in progress    Osteoporosis     osteopenia noted on 2/13 and 7/15 dexa    S/P colonoscopy     last 10/15;  next 10/19    SBO (small bowel obstruction)     h/o recurrent SBO;  s/p R hemicolectomy 12/10 and s/p incarcerated hernia repair with extensive scar tissue 6/16    Seizure disorder     Vitreous detachment        Past Surgical History:   Procedure Laterality Date    ABDOMINAL SURGERY  06/2016    Incarcerated incisional hernia, enterotomy x3, and extensive adhesions      CATARACT EXTRACTION      B 6/11    CHOLECYSTECTOMY      COLON SURGERY  12/6/2010  (Dinorah, Ms.)    R hemicolectomy 12/10 due to recurrent SBO from benign colon mass 12/2010.   Benign albeit large polyp.    COLONOSCOPY N/A 10/7/2015    Performed by Will Cr Jr., [facility] at [facility]    COLONOSCOPY W/ POLYPECTOMY  11/22/2010  Dinorah, Ms.    3 cm growth mid ascending colon.  TUBULOVILLOUS ADENOMA. 9 mm polyp mid transverse colon.  TUBULAR ADENOMA.  10 mm polyp distal descending colon.  TUBULOVILLOUS ADENOMA.     ESOPHAGOGASTRODUODENOSCOPY  11/16/2010    Normal EGD.    HERNIA REPAIR  8/2015    from incarcerated hernia    HYSTERECTOMY  10/17/2006    SENDY and BSO 10/06 for stage 1 endometrial cancer    MYRINGOTOMY W/ TUBES      bilateral    REPAIR-HERNIA-INCISIONAL  6/2/2016    [facility]    REPAIR-HERNIA-LAPAROSCOPIC-INCISIONAL N/A 6/2/2016    [facility]    TONSILLECTOMY         Family History   Problem Relation Age of Onset    Cancer Sister         details unknown    Heart attack Father     Pulmonary embolism Mother        Social History     Socioeconomic History    Marital status: Single     Spouse name: None    Number of children: None    Years of education: None    Highest education level: None   Social Needs    Financial resource  strain: None    Food insecurity - worry: None    Food insecurity - inability: None    Transportation needs - medical: None    Transportation needs - non-medical: None   Occupational History    None   Tobacco Use    Smoking status: Never Smoker    Smokeless tobacco: Never Used   Substance and Sexual Activity    Alcohol use: No     Alcohol/week: 0.0 oz    Drug use: No    Sexual activity: No   Other Topics Concern    None   Social History Narrative    Lives at [facility].       Current Outpatient Prescriptions   Medication Sig Dispense Refill    alendronate (FOSAMAX) 70 MG tablet TAKE ONE TABLET WEEKLY AS DIRECTED ON THURSDAY 4 tablet 0    calcium carbonate-vitamin D3 (CALCIUM 600 + D,3,) 600 mg calcium- 200 unit Cap Take 1 capsule by mouth 2 (two) times daily.      CRESTOR 10 mg tablet TAKE ONE TABLET BY MOUTH EVERY NIGHT AT BEDTIME 30 tablet 0    diltiaZEM (CARDIZEM CD) 360 MG 24 hr capsule       dimethicone-kelvin-A-C-E-aloe (GOLD BOND ULTIMATE DIABETICS') 3-30 % Crea Apply topically 2 (two) times daily. APPLY TO FEET      docusate sodium (COLACE) 100 MG capsule Take 100 mg by mouth 2 (two) times daily.      fish oil-omega-3 fatty acids 300-1,000 mg capsule Take 2 g by mouth once daily.      hydrALAZINE (APRESOLINE) 50 MG tablet Take 50 mg by mouth every 12 (twelve) hours.      hydroCHLOROthiazide (HYDRODIURIL) 25 MG tablet       iron-vitamin C 100-250 mg, ICAR-C, 100-250 mg Tab Take one tablet in the morning with an acidic drink before breakfast. 90 tablet 0    labetalol (NORMODYNE) 300 MG tablet TAKE ONE TABLET BY MOUTH TWICE DAILY FOR BLOOD PRESSURE 60 tablet 0    LANTUS SOLOSTAR 100 unit/mL (3 mL) InPn pen Inject 35 Units into the skin every evening.       lisinopril (PRINIVIL,ZESTRIL) 20 MG tablet       lutein 20 mg Cap TAKE ONE SOFTGEL BY MOUTH EVERY MORNING 30 each 0    multivitamin capsule Take 1 capsule by mouth once daily.      polyethylene glycol (GLYCOLAX) 17 gram/dose powder        TEGRETOL  mg 12 hr tablet TAKE ONE TABLET BY MOUTH ONCE DAILY SEIZURES (Patient taking differently: one tablet bid) 30 tablet 0     No current facility-administered medications for this visit.        Review of patient's allergies indicates:  No Known Allergies      Review of Systems   Constitution: Negative for chills and fever.   Cardiovascular: Negative for claudication and leg swelling.   Skin: Positive for color change, nail changes and suspicious lesions.   Musculoskeletal: Negative for arthritis, joint pain and joint swelling.   Gastrointestinal: Negative for nausea and vomiting.   Neurological: Positive for numbness. Negative for paresthesias.   Psychiatric/Behavioral: Negative for altered mental status.           Objective:      Physical Exam   Constitutional: She is oriented to person, place, and time. She appears well-developed and well-nourished. No distress.   Cardiovascular:   Pulses:       Dorsalis pedis pulses are 2+ on the right side, and 2+ on the left side.        Posterior tibial pulses are 1+ on the right side, and 1+ on the left side.   CFT <3 seconds bilateral.  Pedal hair growth decreased bilateral.  Varicosities noted to bilateral lower extremity.  Mild nonpitting edema noted to bilateral lower extremity.  Toes are cool to touch bilateral.     Musculoskeletal: She exhibits edema. She exhibits no tenderness.   Muscle strength 5/5 in all muscle groups bilateral.  No tenderness nor crepitation with ROM of foot/ankle joints bilateral.  No tenderness with palpation of bilateral foot and ankle.  Bilateral pes planus foot type.  Bilateral semi-rigid contracture of toes 2-5.   Neurological: She is alert and oriented to person, place, and time. She has normal strength. A sensory deficit is present.   Protective sensation per [place] monofilament decreased bilateral.    Vibratory sensation decreased bilateral.    Light touch intact bilateral.   Skin: Skin is warm, dry and intact.  Capillary refill takes less than 2 seconds. Lesion noted. No abrasion, no bruising, no burn, no ecchymosis, no laceration, no petechiae and no rash noted. She is not diaphoretic. No cyanosis or erythema. No pallor. Nails show no clubbing.   Pedal skin appears thin and atrophic bilateral.  Toenails x 10 appear thickened by 2 mm's, elongated by 2 mm's, and discolored with subungual debris.  Hyperkeratotic lesion noted to bilateral sub 1st, 2nd, and 3rd metatarsal heads.  Also, noted to the tips of toes 2-3 bilateral.   Multiple nevi noted to the dorsum of the Lt. Foot. No break in the adjacent skin integrity.              Assessment:       Encounter Diagnoses   Name Primary?    Diabetic polyneuropathy associated with type 2 diabetes mellitus Yes    Corn or callus     Hammer toes of both feet     Onychomycosis due to dermatophyte          Plan:       Rebeca was seen today for diabetes mellitus and diabetic foot exam.    Diagnoses and all orders for this visit:    Diabetic polyneuropathy associated with type 2 diabetes mellitus    Corn or callus    Hammer toes of both feet    Onychomycosis due to dermatophyte      I counseled the patient on her conditions, their implications and medical management.    Advised to continue wearing shoe gear that accommodates for digital deformities.    Shoe inspection. Diabetic Foot Education. Patient reminded of the importance of good nutrition and blood sugar control to help prevent podiatric complications of diabetes. Patient instructed on proper foot hygeine. We discussed wearing proper shoe gear, daily foot inspections, never walking without protective shoe gear, never putting sharp instruments to feet    With patient's permission, nails were aggressively reduced and debrided x 10 to their soft tissue attachment mechanically and with electric , removing all offending nail and debris.  Also, a sterile #15 scalpel was used to trim lesions x 10 down to smooth appearing skin  without incident.  Patient relates relief following the procedure. She will continue to monitor the areas daily, inspect her feet, wear protective shoe gear when ambulatory, moisturizer to maintain skin integrity and follow in this office in approximately 2-3 months, sooner p.r.n.    Follow-up in about 3 months (around 4/15/2019).    Sae Snowden DPM

## 2019-02-19 ENCOUNTER — LAB VISIT (OUTPATIENT)
Dept: LAB | Facility: HOSPITAL | Age: 79
End: 2019-02-19
Attending: INTERNAL MEDICINE
Payer: MEDICARE

## 2019-02-19 DIAGNOSIS — D64.9 CHRONIC ANEMIA: ICD-10-CM

## 2019-02-19 LAB
ALBUMIN SERPL BCP-MCNC: 3.4 G/DL
ALP SERPL-CCNC: 153 U/L
ALT SERPL W/O P-5'-P-CCNC: 25 U/L
ANION GAP SERPL CALC-SCNC: 8 MMOL/L
AST SERPL-CCNC: 16 U/L
BASOPHILS # BLD AUTO: 0.02 K/UL
BASOPHILS NFR BLD: 0.3 %
BILIRUB SERPL-MCNC: 0.3 MG/DL
BUN SERPL-MCNC: 25 MG/DL
CALCIUM SERPL-MCNC: 10.1 MG/DL
CHLORIDE SERPL-SCNC: 103 MMOL/L
CO2 SERPL-SCNC: 33 MMOL/L
CREAT SERPL-MCNC: 1 MG/DL
DIFFERENTIAL METHOD: ABNORMAL
EOSINOPHIL # BLD AUTO: 0.1 K/UL
EOSINOPHIL NFR BLD: 1.2 %
ERYTHROCYTE [DISTWIDTH] IN BLOOD BY AUTOMATED COUNT: 13.6 %
EST. GFR  (AFRICAN AMERICAN): >60 ML/MIN/1.73 M^2
EST. GFR  (NON AFRICAN AMERICAN): 54 ML/MIN/1.73 M^2
FERRITIN SERPL-MCNC: 50 NG/ML
GLUCOSE SERPL-MCNC: 179 MG/DL
HCT VFR BLD AUTO: 33.4 %
HGB BLD-MCNC: 10.6 G/DL
IRON SERPL-MCNC: 72 UG/DL
LYMPHOCYTES # BLD AUTO: 0.8 K/UL
LYMPHOCYTES NFR BLD: 10.9 %
MCH RBC QN AUTO: 29.8 PG
MCHC RBC AUTO-ENTMCNC: 31.7 G/DL
MCV RBC AUTO: 94 FL
MONOCYTES # BLD AUTO: 0.5 K/UL
MONOCYTES NFR BLD: 7 %
NEUTROPHILS # BLD AUTO: 5.5 K/UL
NEUTROPHILS NFR BLD: 80.6 %
PLATELET # BLD AUTO: 226 K/UL
PMV BLD AUTO: 9.8 FL
POTASSIUM SERPL-SCNC: 4.1 MMOL/L
PROT SERPL-MCNC: 6.3 G/DL
RBC # BLD AUTO: 3.56 M/UL
SATURATED IRON: 21 %
SODIUM SERPL-SCNC: 144 MMOL/L
TOTAL IRON BINDING CAPACITY: 339 UG/DL
TRANSFERRIN SERPL-MCNC: 229 MG/DL
WBC # BLD AUTO: 6.87 K/UL

## 2019-02-19 PROCEDURE — 85025 COMPLETE CBC W/AUTO DIFF WBC: CPT | Mod: PO

## 2019-02-19 PROCEDURE — 80053 COMPREHEN METABOLIC PANEL: CPT | Mod: PO

## 2019-02-19 PROCEDURE — 36415 COLL VENOUS BLD VENIPUNCTURE: CPT | Mod: PO

## 2019-02-19 PROCEDURE — 83540 ASSAY OF IRON: CPT

## 2019-02-19 PROCEDURE — 82728 ASSAY OF FERRITIN: CPT

## 2019-02-22 ENCOUNTER — OFFICE VISIT (OUTPATIENT)
Dept: HEMATOLOGY/ONCOLOGY | Facility: CLINIC | Age: 79
End: 2019-02-22
Payer: MEDICARE

## 2019-02-22 VITALS
DIASTOLIC BLOOD PRESSURE: 53 MMHG | BODY MASS INDEX: 31.56 KG/M2 | WEIGHT: 178.13 LBS | SYSTOLIC BLOOD PRESSURE: 168 MMHG | HEART RATE: 60 BPM | HEIGHT: 63 IN | RESPIRATION RATE: 20 BRPM | TEMPERATURE: 98 F

## 2019-02-22 DIAGNOSIS — D64.9 CHRONIC ANEMIA: ICD-10-CM

## 2019-02-22 DIAGNOSIS — N18.30 CKD (CHRONIC KIDNEY DISEASE) STAGE 3, GFR 30-59 ML/MIN: ICD-10-CM

## 2019-02-22 DIAGNOSIS — D50.9 IRON DEFICIENCY ANEMIA, UNSPECIFIED IRON DEFICIENCY ANEMIA TYPE: Primary | ICD-10-CM

## 2019-02-22 PROCEDURE — 99999 PR PBB SHADOW E&M-EST. PATIENT-LVL V: CPT | Mod: PBBFAC,,, | Performed by: NURSE PRACTITIONER

## 2019-02-22 PROCEDURE — 99215 OFFICE O/P EST HI 40 MIN: CPT | Mod: PBBFAC,PN | Performed by: NURSE PRACTITIONER

## 2019-02-22 PROCEDURE — 99999 PR PBB SHADOW E&M-EST. PATIENT-LVL V: ICD-10-PCS | Mod: PBBFAC,,, | Performed by: NURSE PRACTITIONER

## 2019-02-22 PROCEDURE — 99213 OFFICE O/P EST LOW 20 MIN: CPT | Mod: S$PBB,,, | Performed by: NURSE PRACTITIONER

## 2019-02-22 PROCEDURE — 99213 PR OFFICE/OUTPT VISIT, EST, LEVL III, 20-29 MIN: ICD-10-PCS | Mod: S$PBB,,, | Performed by: NURSE PRACTITIONER

## 2019-02-22 NOTE — PROGRESS NOTES
HISTORY OF PRESENT ILLNESS:  This is a 78-year-old mentally challenged white   female known to Dr. Voss for a diagnosis regarding hypochromic normocytic anemia   in association with elevated alkaline phosphatase levels.  She tried a course of   ICAR-C followed by MVI with iron.    She presents to the clinic today with her sitter as she resides in a group home in   [facility].  She is upbeat and talkative.  She denies any fatigue, weakness,   headaches, blurred vision, melena, pica, abdominal discomfort, nausea, vomiting,   constipation, diarrhea, fevers, chills, etc.  No other new complaints or pertinent findings   on a 14-point review of systems.    PHYSICAL EXAMINATION:  GENERAL:  Well-developed, well-nourished elderly white female, mentally   challenged, in no acute distress.  Alert and oriented to person and place.  VITAL SIGNS:  Weight:  Gain of 3 pounds in 6 months   Wt Readings from Last 3 Encounters:   02/22/19 80.8 kg (178 lb 2.1 oz)   01/15/19 80.7 kg (177 lb 14.6 oz)   11/13/18 80.7 kg (178 lb)     Temp Readings from Last 3 Encounters:   02/22/19 98.4 °F (36.9 °C)   08/23/18 97.6 °F (36.4 °C) (Oral)   05/22/18 98 °F (36.7 °C)     BP Readings from Last 3 Encounters:   02/22/19 (!) 168/53   01/07/19 (!) 167/58   11/13/18 (!) 165/72     Pulse Readings from Last 3 Encounters:   02/22/19 60   01/07/19 62   11/13/18 62     HEENT:  Normocephalic, atraumatic.  Oral mucosa pink and moist.  Lips without   lesions.  Tongue midline.  Oropharynx clear.  Nonicteric sclerae.   NECK:  Supple, no adenopathy.  No carotid bruits, thyromegaly or thyroid nodule.  HEART:  Regular rate and rhythm without murmur, gallop or rub.   LUNGS:  Clear to auscultation bilaterally.  Normal respiratory effort.   ABDOMEN:  Soft, nontender, nondistended with positive normoactive bowel sounds,   no hepatosplenomegaly.  EXTREMITIES:  No cyanosis, clubbing or edema.  Distal pulses are intact.     LABORATORY:   Lab Results   Component Value  Date    WBC 6.87 02/19/2019    HGB 10.6 (L) 02/19/2019    HCT 33.4 (L) 02/19/2019    MCV 94 02/19/2019     02/19/2019     Differential remarkable for 80.6% grans, 10.9% lymph  Hemoglobin improved from 9.8 to 10.6    Lab Results   Component Value Date    IRON 72 02/19/2019    TIBC 339 02/19/2019    FERRITIN 50 02/19/2019     Iron stores improved    CMP  Sodium   Date Value Ref Range Status   02/19/2019 144 136 - 145 mmol/L Final     Potassium   Date Value Ref Range Status   02/19/2019 4.1 3.5 - 5.1 mmol/L Final     Chloride   Date Value Ref Range Status   02/19/2019 103 95 - 110 mmol/L Final     CO2   Date Value Ref Range Status   02/19/2019 33 (H) 23 - 29 mmol/L Final     Glucose   Date Value Ref Range Status   02/19/2019 179 (H) 70 - 110 mg/dL Final     BUN, Bld   Date Value Ref Range Status   02/19/2019 25 (H) 8 - 23 mg/dL Final     Creatinine   Date Value Ref Range Status   02/19/2019 1.0 0.5 - 1.4 mg/dL Final     Calcium   Date Value Ref Range Status   02/19/2019 10.1 8.7 - 10.5 mg/dL Final     Total Protein   Date Value Ref Range Status   02/19/2019 6.3 6.0 - 8.4 g/dL Final     Albumin   Date Value Ref Range Status   02/19/2019 3.4 (L) 3.5 - 5.2 g/dL Final     Total Bilirubin   Date Value Ref Range Status   02/19/2019 0.3 0.1 - 1.0 mg/dL Final     Comment:     For infants and newborns, interpretation of results should be based  on gestational age, weight and in agreement with clinical  observations.  Premature Infant recommended reference ranges:  Up to 24 hours.............<8.0 mg/dL  Up to 48 hours............<12.0 mg/dL  3-5 days..................<15.0 mg/dL  6-29 days.................<15.0 mg/dL       Alkaline Phosphatase   Date Value Ref Range Status   02/19/2019 153 (H) 55 - 135 U/L Final     AST   Date Value Ref Range Status   02/19/2019 16 10 - 40 U/L Final     ALT   Date Value Ref Range Status   02/19/2019 25 10 - 44 U/L Final     Anion Gap   Date Value Ref Range Status   02/19/2019 8 8 - 16  mmol/L Final     eGFR if    Date Value Ref Range Status   02/19/2019 >60 >60 mL/min/1.73 m^2 Final     eGFR if non    Date Value Ref Range Status   02/19/2019 54 (A) >60 mL/min/1.73 m^2 Final     Comment:     Calculation used to obtain the estimated glomerular filtration  rate (eGFR) is the CKD-EPI equation.        IMPRESSION:  1.  Anemia of chronic disease, improved, stable  2.  CKD III.  3.  Fatty infiltration of the liver.  4.  Elevated alk phos - stable.  5.  HTN - follow with primary    PLAN:    1.  Continue MVI with low iron i daily.  2.  Return to clinic in 6 months with interval CBC, CMP prior.    Assessment/plan reviewed and approved by Dr. Voss.

## 2019-03-18 ENCOUNTER — TELEPHONE (OUTPATIENT)
Dept: OPHTHALMOLOGY | Facility: CLINIC | Age: 79
End: 2019-03-18

## 2019-03-19 ENCOUNTER — TELEPHONE (OUTPATIENT)
Dept: OPHTHALMOLOGY | Facility: CLINIC | Age: 79
End: 2019-03-19

## 2019-03-25 ENCOUNTER — TELEPHONE (OUTPATIENT)
Dept: OPHTHALMOLOGY | Facility: CLINIC | Age: 79
End: 2019-03-25

## 2019-03-25 NOTE — TELEPHONE ENCOUNTER
----- Message from Luli Jose sent at 3/25/2019 12:10 PM CDT -----  Contact: Gricel Ag [facility]    Reason for call: Needs to rescehdule appointment for the see the doctor        Communication Preference: [phone]    Additional Information:     Principal Discharge DX:	Non-intractable vomiting with nausea, unspecified vomiting type

## 2019-03-27 ENCOUNTER — HOSPITAL ENCOUNTER (OUTPATIENT)
Dept: RADIOLOGY | Facility: HOSPITAL | Age: 79
Discharge: HOME | End: 2019-03-27
Attending: INTERNAL MEDICINE
Payer: MEDICARE

## 2019-03-27 DIAGNOSIS — M54.50 LOW BACK PAIN: ICD-10-CM

## 2019-03-27 PROCEDURE — 72100 X-RAY EXAM L-S SPINE 2/3 VWS: CPT | Mod: 26,,, | Performed by: RADIOLOGY

## 2019-03-27 PROCEDURE — 72100 X-RAY EXAM L-S SPINE 2/3 VWS: CPT | Mod: TC,FY,PO

## 2019-03-27 PROCEDURE — 72100 XR LUMBAR SPINE AP AND LATERAL: ICD-10-PCS | Mod: 26,,, | Performed by: RADIOLOGY

## 2019-03-28 ENCOUNTER — TELEPHONE (OUTPATIENT)
Dept: OPHTHALMOLOGY | Facility: CLINIC | Age: 79
End: 2019-03-28

## 2019-03-28 NOTE — TELEPHONE ENCOUNTER
----- Message from Guerda Corey sent at 3/28/2019 10:24 AM CDT -----  Contact: Gricel Fisher with [facility] Fisher with [facility] needs first available appointment due to reschedule procedure missed on 03/18/19. Please call Gricel at [phone]. Thanks!

## 2019-04-01 ENCOUNTER — TELEPHONE (OUTPATIENT)
Dept: NEUROSURGERY | Facility: CLINIC | Age: 79
End: 2019-04-01

## 2019-04-01 NOTE — TELEPHONE ENCOUNTER
Spoke to patient regarding scheduled appointment with Taylor Cota. Date, time, and location confirmed,

## 2019-04-01 NOTE — TELEPHONE ENCOUNTER
----- Message from Rodger Kowalski sent at 4/1/2019  2:52 PM CDT -----  Type:  Sooner Apoointment Request    Caller is requesting a sooner appointment.  Caller declined first available appointment listed below.  Caller will not accept being placed on the waitlist and is requesting a message be sent to doctor.    Name of Caller:  Medical assistant- Gricel Fisher -[phone]  When is the first available appointment?    Symptoms:  Back pain  Best Call Back Number:    Additional Information:

## 2019-04-12 ENCOUNTER — OFFICE VISIT (OUTPATIENT)
Dept: SPINE | Facility: CLINIC | Age: 79
End: 2019-04-12
Payer: MEDICARE

## 2019-04-12 VITALS
BODY MASS INDEX: 32.32 KG/M2 | HEIGHT: 63 IN | WEIGHT: 182.44 LBS | HEART RATE: 59 BPM | DIASTOLIC BLOOD PRESSURE: 59 MMHG | SYSTOLIC BLOOD PRESSURE: 175 MMHG

## 2019-04-12 DIAGNOSIS — M54.6 ACUTE MIDLINE THORACIC BACK PAIN: Primary | ICD-10-CM

## 2019-04-12 PROCEDURE — 99999 PR PBB SHADOW E&M-EST. PATIENT-LVL IV: ICD-10-PCS | Mod: PBBFAC,,, | Performed by: PHYSICIAN ASSISTANT

## 2019-04-12 PROCEDURE — 99214 OFFICE O/P EST MOD 30 MIN: CPT | Mod: PBBFAC,PN | Performed by: PHYSICIAN ASSISTANT

## 2019-04-12 PROCEDURE — 99203 PR OFFICE/OUTPT VISIT, NEW, LEVL III, 30-44 MIN: ICD-10-PCS | Mod: S$PBB,,, | Performed by: PHYSICIAN ASSISTANT

## 2019-04-12 PROCEDURE — 99999 PR PBB SHADOW E&M-EST. PATIENT-LVL IV: CPT | Mod: PBBFAC,,, | Performed by: PHYSICIAN ASSISTANT

## 2019-04-12 PROCEDURE — 99203 OFFICE O/P NEW LOW 30 MIN: CPT | Mod: S$PBB,,, | Performed by: PHYSICIAN ASSISTANT

## 2019-04-12 NOTE — PROGRESS NOTES
Neurosurgery History & Physical    Patient ID: Rebeca Turner is a 78 y.o. female.    Chief Complaint   Patient presents with    Low-back Pain     She has had low back pain since falling 2 weeks ago. Pain comes and goes. Tylenol helps pain. Pain is worse first thing in the morning.       Review of Systems   Constitutional: Negative for activity change, appetite change, chills, fever and unexpected weight change.   HENT: Negative for tinnitus, trouble swallowing and voice change.    Respiratory: Negative for apnea, cough, chest tightness and shortness of breath.    Cardiovascular: Negative for chest pain and palpitations.   Gastrointestinal: Negative for constipation, diarrhea, nausea and vomiting.   Genitourinary: Negative for difficulty urinating, dysuria, frequency and urgency.   Musculoskeletal: Positive for back pain and myalgias. Negative for gait problem, neck pain and neck stiffness.   Skin: Negative for wound.   Neurological: Negative for dizziness, tremors, seizures, facial asymmetry, speech difficulty, weakness, light-headedness, numbness and headaches.   Psychiatric/Behavioral: Negative for confusion and decreased concentration.       Past Medical History:   Diagnosis Date    Anemia of chronic disease     hematology su neg    CKD (chronic kidney disease) stage 3, GFR 30-59 ml/min     DM type 2 (diabetes mellitus, type 2)     DVT (deep venous thrombosis)     x 2 both post-op with last 6/16    Dysplastic nevi     h/o    Fatty liver     noted on usg 9/15    GERD (gastroesophageal reflux disease)     H/O esophagogastroduodenoscopy     normal 11/10    History of endometrial cancer     stage 1;  s/p SENDY with BSO 10/06    HTN (hypertension)     Hyperlipidemia     LVE (left ventricular enlargement)     noted on 6/15 ECHO    Macular degeneration     exudative    MR (mental retardation)     Obesity     LUISANA (obstructive sleep apnea)     su in progress    Osteoporosis     osteopenia noted on 2/13  and 7/15 dexa    S/P colonoscopy     last 10/15;  next 10/19    SBO (small bowel obstruction)     h/o recurrent SBO;  s/p R hemicolectomy 12/10 and s/p incarcerated hernia repair with extensive scar tissue 6/16    Seizure disorder     Vitreous detachment      Social History     Socioeconomic History    Marital status: Single     Spouse name: Not on file    Number of children: Not on file    Years of education: Not on file    Highest education level: Not on file   Occupational History    Not on file   Social Needs    Financial resource strain: Not on file    Food insecurity:     Worry: Not on file     Inability: Not on file    Transportation needs:     Medical: Not on file     Non-medical: Not on file   Tobacco Use    Smoking status: Never Smoker    Smokeless tobacco: Never Used   Substance and Sexual Activity    Alcohol use: No     Alcohol/week: 0.0 oz    Drug use: No    Sexual activity: Never   Lifestyle    Physical activity:     Days per week: Not on file     Minutes per session: Not on file    Stress: Not on file   Relationships    Social connections:     Talks on phone: Not on file     Gets together: Not on file     Attends Jain service: Not on file     Active member of club or organization: Not on file     Attends meetings of clubs or organizations: Not on file     Relationship status: Not on file   Other Topics Concern    Not on file   Social History Narrative    Lives at [facility].     Family History   Problem Relation Age of Onset    Cancer Sister         details unknown    Heart attack Father     Pulmonary embolism Mother      Review of patient's allergies indicates:  No Known Allergies    Current Outpatient Medications:     acetaminophen (TYLENOL) 500 MG tablet, Take 500 mg by mouth every 6 (six) hours as needed for Pain., Disp: , Rfl:     alendronate (FOSAMAX) 70 MG tablet, TAKE ONE TABLET WEEKLY AS DIRECTED ON THURSDAY, Disp: 4 tablet, Rfl: 0    aspirin 81 MG Chew, Take 81 mg  "by mouth once daily., Disp: , Rfl:     BD AUTOSHIELD DUO PEN NEEDLE 30 gauge x 3/16" Ndle, , Disp: , Rfl:     calcium carbonate-vitamin D3 (CALCIUM 600 + D,3,) 600 mg calcium- 200 unit Cap, Take 1 capsule by mouth 2 (two) times daily., Disp: , Rfl:     carBAMazepine (TEGRETOL XR) 100 MG 12 hr tablet, Take 100 mg by mouth 2 (two) times daily., Disp: , Rfl:     cetirizine (ZYRTEC) 10 MG tablet, Take 10 mg by mouth once daily., Disp: , Rfl:     CRESTOR 10 mg tablet, TAKE ONE TABLET BY MOUTH EVERY NIGHT AT BEDTIME, Disp: 30 tablet, Rfl: 0    diltiaZEM (CARDIZEM CD) 360 MG 24 hr capsule, Take 360 mg by mouth once daily. , Disp: , Rfl:     dimethicone-kelvin-A-C-E-aloe (GOLD BOND ULTIMATE DIABETICS') 3-30 % Crea, Apply topically 2 (two) times daily. APPLY TO FEET, Disp: , Rfl:     docusate sodium (COLACE) 100 MG capsule, Take 100 mg by mouth 2 (two) times daily., Disp: , Rfl:     fish oil-omega-3 fatty acids 300-1,000 mg capsule, Take 2 g by mouth once daily., Disp: , Rfl:     hydrALAZINE (APRESOLINE) 50 MG tablet, Take 50 mg by mouth every 12 (twelve) hours., Disp: , Rfl:     hydroCHLOROthiazide (HYDRODIURIL) 25 MG tablet, Take 25 mg by mouth once daily. , Disp: , Rfl:     iron-vitamin C 100-250 mg, ICAR-C, 100-250 mg Tab, Take one tablet in the morning with an acidic drink before breakfast., Disp: 90 tablet, Rfl: 0    labetalol (NORMODYNE) 300 MG tablet, TAKE ONE TABLET BY MOUTH TWICE DAILY FOR BLOOD PRESSURE, Disp: 60 tablet, Rfl: 0    LANTUS SOLOSTAR 100 unit/mL (3 mL) InPn pen, Inject 35 Units into the skin every evening. , Disp: , Rfl:     LEVEMIR FLEXTOUCH U-100 INSULN 100 unit/mL (3 mL) InPn pen, 35 Units every evening. , Disp: , Rfl:     lisinopril (PRINIVIL,ZESTRIL) 20 MG tablet, Take 20 mg by mouth 2 (two) times daily. , Disp: , Rfl:     lutein 20 mg Cap, TAKE ONE SOFTGEL BY MOUTH EVERY MORNING, Disp: 30 each, Rfl: 0    multivitamin capsule, Take 1 capsule by mouth once daily., Disp: , Rfl:     " "polyethylene glycol (GLYCOLAX) 17 gram/dose powder, Take by mouth. 1/2 capful of purple top- in 8 ounces of liquid, Disp: , Rfl:     rosuvastatin (CRESTOR) 10 MG tablet, Take 10 mg by mouth once daily., Disp: , Rfl:     TEGRETOL  mg 12 hr tablet, TAKE ONE TABLET BY MOUTH ONCE DAILY SEIZURES (Patient taking differently: one tablet bid), Disp: 30 tablet, Rfl: 0    Vitals:    04/12/19 1432   BP: (!) 175/59   BP Location: Right arm   Patient Position: Sitting   BP Method: Medium (Automatic)   Pulse: (!) 59   Weight: 82.7 kg (182 lb 6.9 oz)   Height: 5' 3" (1.6 m)       Physical Exam   Constitutional: She is oriented to person, place, and time. She appears well-developed and well-nourished.   HENT:   Head: Normocephalic and atraumatic.   Eyes: Pupils are equal, round, and reactive to light.   Neck: Normal range of motion. Neck supple.   Cardiovascular: Normal rate.   Pulmonary/Chest: Effort normal.   Musculoskeletal: Normal range of motion. She exhibits no edema.   Neurological: She is alert and oriented to person, place, and time. She has a normal Finger-Nose-Finger Test, a normal Heel to Shin Test, a normal Romberg Test and a normal Tandem Gait Test. Gait normal.   Reflex Scores:       Tricep reflexes are 2+ on the right side and 2+ on the left side.       Bicep reflexes are 2+ on the right side and 2+ on the left side.       Brachioradialis reflexes are 2+ on the right side and 2+ on the left side.       Patellar reflexes are 2+ on the right side and 2+ on the left side.       Achilles reflexes are 2+ on the right side and 2+ on the left side.  Skin: Skin is warm, dry and intact.   Psychiatric: She has a normal mood and affect. Her speech is normal and behavior is normal. Judgment and thought content normal.   Nursing note and vitals reviewed.      Neurologic Exam     Mental Status   Oriented to person, place, and time.   Oriented to person.   Oriented to place.   Oriented to time.   Follows 3 step commands. "   Attention: normal. Concentration: normal.   Speech: speech is normal   Level of consciousness: alert  Knowledge: consistent with education.   Able to name object. Able to read. Able to repeat. Able to write. Normal comprehension.     Cranial Nerves     CN II   Visual acuity: normal  Right visual field deficit: none  Left visual field deficit: none     CN III, IV, VI   Pupils are equal, round, and reactive to light.  Right pupil: Size: 3 mm. Shape: regular. Reactivity: brisk. Consensual response: intact.   Left pupil: Size: 3 mm. Shape: regular. Reactivity: brisk. Consensual response: intact.   CN III: no CN III palsy  CN VI: no CN VI palsy  Nystagmus: none   Diplopia: none  Ophthalmoparesis: none  Conjugate gaze: present    CN V   Right facial sensation deficit: none  Left facial sensation deficit: none    CN VII   Right facial weakness: none  Left facial weakness: none    CN VIII   Hearing: intact    CN IX, X   CN IX normal.   CN X normal.     CN XI   Right sternocleidomastoid strength: normal  Left sternocleidomastoid strength: normal  Right trapezius strength: normal  Left trapezius strength: normal    CN XII   Fasciculations: absent  Tongue deviation: none    Motor Exam   Muscle bulk: normal  Overall muscle tone: normal  Right arm pronator drift: absent  Left arm pronator drift: absent    Strength   Right neck flexion: 5/5  Left neck flexion: 5/5  Right neck extension: 5/5  Left neck extension: 5/5  Right deltoid: 5/5  Left deltoid: 5/5  Right biceps: 5/5  Left biceps: 5/5  Right triceps: 5/5  Left triceps: 5/5  Right wrist flexion: 5/5  Left wrist flexion: 5/5  Right wrist extension: 5/5  Left wrist extension: 5/5  Right interossei: 5/5  Left interossei: 5/5  Right abdominals: 5/5  Left abdominals: 5/5  Right iliopsoas: 5/5  Left iliopsoas: 5/5  Right quadriceps: 5/5  Left quadriceps: 5/5  Right hamstrin/5  Left hamstrin/5  Right glutei: 5/5  Left glutei: 5/5  Right anterior tibial: 5/5  Left anterior  tibial: 5/5  Right posterior tibial: 5/5  Left posterior tibial: 5/5  Right peroneal: 5/5  Left peroneal: 5/5  Right gastroc: 5/5  Left gastroc: 5/5No tenderness to percussion over the spine.     Sensory Exam   Right arm light touch: normal  Left arm light touch: normal  Right leg light touch: normal  Left leg light touch: normal  Right arm vibration: normal  Left arm vibration: normal  Right arm pinprick: normal  Left arm pinprick: normal    Gait, Coordination, and Reflexes     Gait  Gait: normal    Coordination   Romberg: negative  Finger to nose coordination: normal  Heel to shin coordination: normal  Tandem walking coordination: normal    Tremor   Resting tremor: absent  Intention tremor: absent  Action tremor: absent    Reflexes   Right brachioradialis: 2+  Left brachioradialis: 2+  Right biceps: 2+  Left biceps: 2+  Right triceps: 2+  Left triceps: 2+  Right patellar: 2+  Left patellar: 2+  Right achilles: 2+  Left achilles: 2+  Right Babb: absent  Left Babb: absent  Right ankle clonus: absent  Left ankle clonus: absent      Provider dictation:  78-year-old obese female with anemia, chronic kidney disease, diabetes, GERD, history of seizures, mental retardation, hypertension is self-referred for evaluation of back pain.  She fell 2 weeks ago in the restroom requiring help and assistance to get back to her feet.  Since then she has felt intermittent thoracic region back pain that has been worse in the morning.  Pain is felt along the axis of the upper to midthoracic region.  She denies any chest wall radiculopathy.  She denies any lower back pain and lower extremity radicular symptoms.  She denies bowel bladder dysfunction.  She takes Tylenol as needed for pain.  Tylenol completely resolves pain when needed.  She has not had any other treatments.  Oswestry score: 17%.  PHQ:  0.    On exam she has no tenderness to percussion along the spine.  She has 5/5 strength in the upper and lower extremities with 2+  muscle stretch reflexes throughout.  She has no sensory deficits.    I reviewed an x-ray of the lumbar spine revealing osteopenic bones with multilevel degenerative changes most significant at L2-3.  There is no evidence of fracture.  Grade 1 retrolisthesis is seen of L1 on L2.  There is no thoracic imaging.    Ms. Turner has acute onset thoracic pain after recent fall.  She has no radicular symptoms and sings of neurologic compression on exam.  Pain is most likely myofascial in nature due to soft tissue injury associated with the recent fall.  It should go on to improve with time.  My suspicion for fracture is low because she has no tenderness to percussion and pain level is 0 at this time.  She has not had thoracic imaging and with a low suspicion for fracture we will hold on imaging.  However is pain worsens and does not continue to improve I do recommend obtiaing imaging and/ or considering PT.  At this time recommend increasing activity as tolerated with frequent walking and stretching  Follow up as needed.    Visit Diagnosis:  Acute midline thoracic back pain        Total time spent counseling greater than fifty percent of total visit time.  Counseling included discussion regarding imaging findings, diagnosis possibilities, treatment options, risks and benefits.   The patient had many questions regarding the options and long-term effects.

## 2019-04-29 ENCOUNTER — PROCEDURE VISIT (OUTPATIENT)
Dept: OPHTHALMOLOGY | Facility: CLINIC | Age: 79
End: 2019-04-29
Attending: OPHTHALMOLOGY
Payer: MEDICARE

## 2019-04-29 VITALS — SYSTOLIC BLOOD PRESSURE: 184 MMHG | DIASTOLIC BLOOD PRESSURE: 61 MMHG | HEART RATE: 63 BPM

## 2019-04-29 DIAGNOSIS — H43.393 VITREOUS FLOATERS OF BOTH EYES: ICD-10-CM

## 2019-04-29 DIAGNOSIS — H35.3222 EXUDATIVE AGE-RELATED MACULAR DEGENERATION OF LEFT EYE WITH INACTIVE CHOROIDAL NEOVASCULARIZATION: ICD-10-CM

## 2019-04-29 DIAGNOSIS — H35.3211 EXUDATIVE AGE-RELATED MACULAR DEGENERATION OF RIGHT EYE WITH ACTIVE CHOROIDAL NEOVASCULARIZATION: Primary | ICD-10-CM

## 2019-04-29 PROCEDURE — 67028 PR INJECT INTRAVITREAL PHARMCOLOGIC: ICD-10-PCS | Mod: S$PBB,RT,, | Performed by: OPHTHALMOLOGY

## 2019-04-29 PROCEDURE — 92134 POSTERIOR SEGMENT OCT RETINA (OCULAR COHERENCE TOMOGRAPHY)-BOTH EYES: ICD-10-PCS | Mod: 26,S$PBB,, | Performed by: OPHTHALMOLOGY

## 2019-04-29 PROCEDURE — 92014 COMPRE OPH EXAM EST PT 1/>: CPT | Mod: 25,S$PBB,, | Performed by: OPHTHALMOLOGY

## 2019-04-29 PROCEDURE — 92134 CPTRZ OPH DX IMG PST SGM RTA: CPT | Mod: PBBFAC,PO | Performed by: OPHTHALMOLOGY

## 2019-04-29 PROCEDURE — 92014 PR EYE EXAM, EST PATIENT,COMPREHESV: ICD-10-PCS | Mod: 25,S$PBB,, | Performed by: OPHTHALMOLOGY

## 2019-04-29 PROCEDURE — 67028 INJECTION EYE DRUG: CPT | Mod: S$PBB,RT,, | Performed by: OPHTHALMOLOGY

## 2019-04-29 PROCEDURE — 67028 INJECTION EYE DRUG: CPT | Mod: PBBFAC,PO,RT | Performed by: OPHTHALMOLOGY

## 2019-04-29 PROCEDURE — C9257 BEVACIZUMAB INJECTION: HCPCS | Mod: PBBFAC,JG,PO | Performed by: OPHTHALMOLOGY

## 2019-04-29 RX ADMIN — BEVACIZUMAB 1.25 MG: 100 INJECTION, SOLUTION INTRAVENOUS at 03:04

## 2019-04-29 NOTE — PATIENT INSTRUCTIONS

## 2019-04-29 NOTE — PROGRESS NOTES
HPI     9-10 wk/OCT  DLS-1/07/2019 Dr. Sue     A couple weeks ago fell without using walker and BS was high 150's just   pulled muscles no other injuries.   Denies changes or pain in eyes    (-)Flashes (-)Floaters  (-)Photophobia  (-)Glare      No gtts         OCT - OD  PED's with SRF  OS - SR Fibrosis - NO SRF      A/P    1. Wet AMD OS  S/p Avastin injections outside  Stable today with cicatrix  Observe    2. Wet AMD OD  S/p Avastin OD x 12  S/p Eylea OD x 10    Avastin OD    Alternate tx      AREDS/AG    3. DM  No significant DR  BS/BP/chol control    4. PCIOL OU      12 weeks OCT      Risks, benefits, and alternatives to treatment discussed in detail with the patient.  The patient voiced understanding and wished to proceed with the procedure    Injection Procedure Note:  Diagnosis: Wet AMD OD    Patient Identified and Time Out complete  Topical Proparacaine and Betadine.  Inject Avastin OD at 6:00 @ 3.5-4mm posterior to limbus  Post Operative Dx: Same  Complications: None  Follow up as above.

## 2019-05-02 ENCOUNTER — OFFICE VISIT (OUTPATIENT)
Dept: PODIATRY | Facility: CLINIC | Age: 79
End: 2019-05-02
Payer: MEDICARE

## 2019-05-02 VITALS — WEIGHT: 182.31 LBS | HEIGHT: 63 IN | BODY MASS INDEX: 32.3 KG/M2

## 2019-05-02 DIAGNOSIS — M20.41 HAMMER TOES OF BOTH FEET: ICD-10-CM

## 2019-05-02 DIAGNOSIS — M20.42 HAMMER TOES OF BOTH FEET: ICD-10-CM

## 2019-05-02 DIAGNOSIS — B35.1 ONYCHOMYCOSIS DUE TO DERMATOPHYTE: ICD-10-CM

## 2019-05-02 DIAGNOSIS — L84 CORN OR CALLUS: ICD-10-CM

## 2019-05-02 DIAGNOSIS — E11.42 DIABETIC POLYNEUROPATHY ASSOCIATED WITH TYPE 2 DIABETES MELLITUS: Primary | ICD-10-CM

## 2019-05-02 PROCEDURE — 99999 PR PBB SHADOW E&M-EST. PATIENT-LVL II: CPT | Mod: PBBFAC,,, | Performed by: PODIATRIST

## 2019-05-02 PROCEDURE — 99212 OFFICE O/P EST SF 10 MIN: CPT | Mod: PBBFAC,PN | Performed by: PODIATRIST

## 2019-05-02 PROCEDURE — 11056 PARNG/CUTG B9 HYPRKR LES 2-4: CPT | Mod: PBBFAC,PN | Performed by: PODIATRIST

## 2019-05-02 PROCEDURE — 99499 NO LOS: ICD-10-PCS | Mod: S$PBB,,, | Performed by: PODIATRIST

## 2019-05-02 PROCEDURE — 11721 PR DEBRIDEMENT OF NAILS, 6 OR MORE: ICD-10-PCS | Mod: S$PBB,59,Q9, | Performed by: PODIATRIST

## 2019-05-02 PROCEDURE — 99499 UNLISTED E&M SERVICE: CPT | Mod: S$PBB,,, | Performed by: PODIATRIST

## 2019-05-02 PROCEDURE — 11721 DEBRIDE NAIL 6 OR MORE: CPT | Mod: PBBFAC,PN,59 | Performed by: PODIATRIST

## 2019-05-02 PROCEDURE — 11721 DEBRIDE NAIL 6 OR MORE: CPT | Mod: S$PBB,59,Q9, | Performed by: PODIATRIST

## 2019-05-02 PROCEDURE — 99999 PR PBB SHADOW E&M-EST. PATIENT-LVL II: ICD-10-PCS | Mod: PBBFAC,,, | Performed by: PODIATRIST

## 2019-05-02 PROCEDURE — 11056 PARNG/CUTG B9 HYPRKR LES 2-4: CPT | Mod: S$PBB,Q9,, | Performed by: PODIATRIST

## 2019-05-02 PROCEDURE — 11056 PR TRIM BENIGN HYPERKERATOTIC SKIN LESION,2-4: ICD-10-PCS | Mod: S$PBB,Q9,, | Performed by: PODIATRIST

## 2019-05-02 NOTE — PROGRESS NOTES
Patient ID: Rebeca Turner is a 78 y.o. female.    Chief Complaint: Diabetes Mellitus (Johan 4/19 6.2 12/14/18) and Diabetic Foot Exam    Rebeca is a 78 y.o. female who presents to the clinic for evaluation and treatment of diabetic feet. Rebeca has a past medical history of Anemia of chronic disease, CKD (chronic kidney disease) stage 3, GFR 30-59 ml/min, DM type 2 (diabetes mellitus, type 2), DVT (deep venous thrombosis), Dysplastic nevi, Fatty liver, GERD (gastroesophageal reflux disease), H/O esophagogastroduodenoscopy, History of endometrial cancer, HTN (hypertension), Hyperlipidemia, LVE (left ventricular enlargement), Macular degeneration, MR (mental retardation), Obesity, LUISANA (obstructive sleep apnea), Osteoporosis, S/P colonoscopy, SBO (small bowel obstruction), Seizure disorder, and Vitreous detachment. Patient relates no major problem with feet. Only complaints today consist of toenails and calluses in need of trimming.  Denies being painful with wearing shoe gear.  Continues to apply moisturizer to the areas of callus build up daily.   Continues to maintain tight blood glucose.  Denies any additional pedal complaints.    PCP: JUSTICE Prado MD    Date Last Seen by PCP: 4/19    Hemoglobin A1C   Date Value Ref Range Status   12/14/2018 6.2 (H) 4.0 - 5.6 % Final     Comment:     ADA Screening Guidelines:  5.7-6.4%  Consistent with prediabetes  >or=6.5%  Consistent with diabetes  High levels of fetal hemoglobin interfere with the HbA1C  assay. Heterozygous hemoglobin variants (HbS, HgC, etc)do  not significantly interfere with this assay.   However, presence of multiple variants may affect accuracy.     01/18/2018 6.5 (H) 4.0 - 5.6 % Final     Comment:     According to ADA guidelines, hemoglobin A1c <7.0% represents  optimal control in non-pregnant diabetic patients. Different  metrics may apply to specific patient populations.   Standards of Medical Care in Diabetes-2016.  For the purpose of screening  for the presence of diabetes:  <5.7%     Consistent with the absence of diabetes  5.7-6.4%  Consistent with increasing risk for diabetes   (prediabetes)  >or=6.5%  Consistent with diabetes  Currently, no consensus exists for use of hemoglobin A1c  for diagnosis of diabetes for children.  This Hemoglobin A1c assay has significant interference with fetal   hemoglobin   (HbF). The results are invalid for patients with abnormal amounts of   HbF,   including those with known Hereditary Persistence   of Fetal Hemoglobin. Heterozygous hemoglobin variants (HbAS, HbAC,   HbAD, HbAE, HbA2) do not significantly interfere with this assay;   however, presence of multiple variants in a sample may impact the %   interference.     11/02/2016 6.9 (H) 4.5 - 6.2 % Final     Comment:     According to ADA guidelines, hemoglobin A1C <7.0% represents  optimal control in non-pregnant diabetic patients.  Different  metrics may apply to specific populations.   Standards of Medical Care in Diabetes - 2016.  For the purpose of screening for the presence of diabetes:  <5.7%     Consistent with the absence of diabetes  5.7-6.4%  Consistent with increasing risk for diabetes   (prediabetes)  >or=6.5%  Consistent with diabetes  Currently no consensus exists for use of hemoglobin A1C  for diagnosis of diabetes for children.             Past Medical History:   Diagnosis Date    Anemia of chronic disease     hematology su neg    CKD (chronic kidney disease) stage 3, GFR 30-59 ml/min     DM type 2 (diabetes mellitus, type 2)     DVT (deep venous thrombosis)     x 2 both post-op with last 6/16    Dysplastic nevi     h/o    Fatty liver     noted on usg 9/15    GERD (gastroesophageal reflux disease)     H/O esophagogastroduodenoscopy     normal 11/10    History of endometrial cancer     stage 1;  s/p SENDY with BSO 10/06    HTN (hypertension)     Hyperlipidemia     LVE (left ventricular enlargement)     noted on 6/15 ECHO    Macular  degeneration     exudative    MR (mental retardation)     Obesity     LUISANA (obstructive sleep apnea)     su in progress    Osteoporosis     osteopenia noted on 2/13 and 7/15 dexa    S/P colonoscopy     last 10/15;  next 10/19    SBO (small bowel obstruction)     h/o recurrent SBO;  s/p R hemicolectomy 12/10 and s/p incarcerated hernia repair with extensive scar tissue 6/16    Seizure disorder     Vitreous detachment        Past Surgical History:   Procedure Laterality Date    ABDOMINAL SURGERY  06/2016    Incarcerated incisional hernia, enterotomy x3, and extensive adhesions      CATARACT EXTRACTION      B 6/11    CHOLECYSTECTOMY      COLON SURGERY  12/6/2010  (Dinorah, Ms.)    R hemicolectomy 12/10 due to recurrent SBO from benign colon mass 12/2010.   Benign albeit large polyp.    COLONOSCOPY N/A 10/7/2015    Performed by Will Cr Jr., [facility] at [facility]    COLONOSCOPY W/ POLYPECTOMY  11/22/2010  Dinorah, Ms.    3 cm growth mid ascending colon.  TUBULOVILLOUS ADENOMA. 9 mm polyp mid transverse colon.  TUBULAR ADENOMA.  10 mm polyp distal descending colon.  TUBULOVILLOUS ADENOMA.     ESOPHAGOGASTRODUODENOSCOPY  11/16/2010    Normal EGD.    HERNIA REPAIR  8/2015    from incarcerated hernia    HYSTERECTOMY  10/17/2006    SENDY and BSO 10/06 for stage 1 endometrial cancer    MYRINGOTOMY W/ TUBES      bilateral    REPAIR-HERNIA-INCISIONAL  6/2/2016    [facility]    REPAIR-HERNIA-LAPAROSCOPIC-INCISIONAL N/A 6/2/2016    [facility]    TONSILLECTOMY         Family History   Problem Relation Age of Onset    Cancer Sister         details unknown    Heart attack Father     Pulmonary embolism Mother        Social History     Socioeconomic History    Marital status: Single     Spouse name: Not on file    Number of children: Not on file    Years of education: Not on file    Highest education level: Not on file   Occupational History     Not on file   Social Needs    Financial resource strain: Not on file    Food insecurity:     Worry: Not on file     Inability: Not on file    Transportation needs:     Medical: Not on file     Non-medical: Not on file   Tobacco Use    Smoking status: Never Smoker    Smokeless tobacco: Never Used   Substance and Sexual Activity    Alcohol use: No     Alcohol/week: 0.0 oz    Drug use: No    Sexual activity: Never   Lifestyle    Physical activity:     Days per week: Not on file     Minutes per session: Not on file    Stress: Not on file   Relationships    Social connections:     Talks on phone: Not on file     Gets together: Not on file     Attends Mandaen service: Not on file     Active member of club or organization: Not on file     Attends meetings of clubs or organizations: Not on file     Relationship status: Not on file   Other Topics Concern    Not on file   Social History Narrative    Lives at [facility].       Current Outpatient Prescriptions   Medication Sig Dispense Refill    alendronate (FOSAMAX) 70 MG tablet TAKE ONE TABLET WEEKLY AS DIRECTED ON THURSDAY 4 tablet 0    calcium carbonate-vitamin D3 (CALCIUM 600 + D,3,) 600 mg calcium- 200 unit Cap Take 1 capsule by mouth 2 (two) times daily.      CRESTOR 10 mg tablet TAKE ONE TABLET BY MOUTH EVERY NIGHT AT BEDTIME 30 tablet 0    diltiaZEM (CARDIZEM CD) 360 MG 24 hr capsule       dimethicone-kelvin-A-C-E-aloe (GOLD BOND ULTIMATE DIABETICS') 3-30 % Crea Apply topically 2 (two) times daily. APPLY TO FEET      docusate sodium (COLACE) 100 MG capsule Take 100 mg by mouth 2 (two) times daily.      fish oil-omega-3 fatty acids 300-1,000 mg capsule Take 2 g by mouth once daily.      hydrALAZINE (APRESOLINE) 50 MG tablet Take 50 mg by mouth every 12 (twelve) hours.      hydroCHLOROthiazide (HYDRODIURIL) 25 MG tablet       iron-vitamin C 100-250 mg, ICAR-C, 100-250 mg Tab Take one tablet in the morning with an acidic drink before breakfast. 90  tablet 0    labetalol (NORMODYNE) 300 MG tablet TAKE ONE TABLET BY MOUTH TWICE DAILY FOR BLOOD PRESSURE 60 tablet 0    LANTUS SOLOSTAR 100 unit/mL (3 mL) InPn pen Inject 35 Units into the skin every evening.       lisinopril (PRINIVIL,ZESTRIL) 20 MG tablet       lutein 20 mg Cap TAKE ONE SOFTGEL BY MOUTH EVERY MORNING 30 each 0    multivitamin capsule Take 1 capsule by mouth once daily.      polyethylene glycol (GLYCOLAX) 17 gram/dose powder       TEGRETOL  mg 12 hr tablet TAKE ONE TABLET BY MOUTH ONCE DAILY SEIZURES (Patient taking differently: one tablet bid) 30 tablet 0     No current facility-administered medications for this visit.        Review of patient's allergies indicates:  No Known Allergies      Review of Systems   Constitution: Negative for chills and fever.   Cardiovascular: Negative for claudication and leg swelling.   Skin: Positive for color change, nail changes and suspicious lesions.   Musculoskeletal: Negative for arthritis, joint pain and joint swelling.   Gastrointestinal: Negative for nausea and vomiting.   Neurological: Positive for numbness. Negative for paresthesias.   Psychiatric/Behavioral: Negative for altered mental status.           Objective:      Physical Exam   Constitutional: She is oriented to person, place, and time. She appears well-developed and well-nourished. No distress.   Cardiovascular:   Pulses:       Dorsalis pedis pulses are 2+ on the right side, and 2+ on the left side.        Posterior tibial pulses are 1+ on the right side, and 1+ on the left side.   CFT <3 seconds bilateral.  Pedal hair growth decreased bilateral.  Varicosities noted to bilateral lower extremity.  Mild nonpitting edema noted to bilateral lower extremity.  Toes are cool to touch bilateral.     Musculoskeletal: She exhibits edema. She exhibits no tenderness.   Muscle strength 5/5 in all muscle groups bilateral.  No tenderness nor crepitation with ROM of foot/ankle joints bilateral.  No  tenderness with palpation of bilateral foot and ankle.  Bilateral pes planus foot type.  Bilateral semi-rigid contracture of toes 2-5.   Neurological: She is alert and oriented to person, place, and time. She has normal strength. A sensory deficit is present.   Protective sensation per [place] monofilament decreased bilateral.    Vibratory sensation decreased bilateral.    Light touch intact bilateral.   Skin: Skin is warm, dry and intact. Capillary refill takes less than 2 seconds. Lesion noted. No abrasion, no bruising, no burn, no ecchymosis, no laceration, no petechiae and no rash noted. She is not diaphoretic. No cyanosis or erythema. No pallor. Nails show no clubbing.   Pedal skin appears thin and atrophic bilateral.  Toenails x 10 appear thickened by 2 mm's, elongated by 2 mm's, and discolored with subungual debris.  Hyperkeratotic lesion noted to bilateral 3rd and 4th toes at the distal tip.  Multiple nevi noted to the dorsum of the Lt. Foot. No break in the adjacent skin integrity.              Assessment:       Encounter Diagnoses   Name Primary?    Diabetic polyneuropathy associated with type 2 diabetes mellitus Yes    Corn or callus     Hammer toes of both feet     Onychomycosis due to dermatophyte          Plan:       Rebeca was seen today for diabetes mellitus and diabetic foot exam.    Diagnoses and all orders for this visit:    Diabetic polyneuropathy associated with type 2 diabetes mellitus    Corn or callus    Hammer toes of both feet    Onychomycosis due to dermatophyte      I counseled the patient on her conditions, their implications and medical management.    Advised to continue wearing shoe gear that accommodates for digital deformities.    Shoe inspection. Diabetic Foot Education. Patient reminded of the importance of good nutrition and blood sugar control to help prevent podiatric complications of diabetes. Patient instructed on proper foot hygeine. We discussed wearing proper  shoe gear, daily foot inspections, never walking without protective shoe gear, never putting sharp instruments to feet    With patient's permission, nails were aggressively reduced and debrided x 10 to their soft tissue attachment mechanically and with electric , removing all offending nail and debris.  Also, a sterile #15 scalpel was used to trim lesions x 4 down to smooth appearing skin without incident.  Patient relates relief following the procedure. She will continue to monitor the areas daily, inspect her feet, wear protective shoe gear when ambulatory, moisturizer to maintain skin integrity and follow in this office in approximately 2-3 months, sooner p.r.n.    Follow up in about 3 months (around 8/2/2019).    Sae Snowden DPM

## 2019-06-05 ENCOUNTER — LAB VISIT (OUTPATIENT)
Dept: LAB | Facility: HOSPITAL | Age: 79
End: 2019-06-05
Attending: INTERNAL MEDICINE
Payer: MEDICARE

## 2019-06-05 DIAGNOSIS — G40.909 EPILEPSY: Primary | ICD-10-CM

## 2019-06-05 DIAGNOSIS — E11.9 DIABETES MELLITUS WITHOUT COMPLICATION: ICD-10-CM

## 2019-06-05 LAB
CARBAMAZEPINE SERPL-MCNC: 6 UG/ML (ref 4–12)
ESTIMATED AVG GLUCOSE: 154 MG/DL (ref 68–131)
HBA1C MFR BLD HPLC: 7 % (ref 4–5.6)
TSH SERPL DL<=0.005 MIU/L-ACNC: 1.92 UIU/ML (ref 0.4–4)

## 2019-06-05 PROCEDURE — 80156 ASSAY CARBAMAZEPINE TOTAL: CPT

## 2019-06-05 PROCEDURE — 83036 HEMOGLOBIN GLYCOSYLATED A1C: CPT

## 2019-06-05 PROCEDURE — 36415 COLL VENOUS BLD VENIPUNCTURE: CPT | Mod: PO

## 2019-06-05 PROCEDURE — 84443 ASSAY THYROID STIM HORMONE: CPT

## 2019-07-29 ENCOUNTER — PROCEDURE VISIT (OUTPATIENT)
Dept: OPHTHALMOLOGY | Facility: CLINIC | Age: 79
End: 2019-07-29
Payer: MEDICARE

## 2019-07-29 DIAGNOSIS — H35.3211 EXUDATIVE AGE-RELATED MACULAR DEGENERATION OF RIGHT EYE WITH ACTIVE CHOROIDAL NEOVASCULARIZATION: Primary | ICD-10-CM

## 2019-07-29 DIAGNOSIS — H35.3222 EXUDATIVE AGE-RELATED MACULAR DEGENERATION OF LEFT EYE WITH INACTIVE CHOROIDAL NEOVASCULARIZATION: ICD-10-CM

## 2019-07-29 PROCEDURE — 92134 CPTRZ OPH DX IMG PST SGM RTA: CPT | Mod: PBBFAC,PO | Performed by: OPHTHALMOLOGY

## 2019-07-29 PROCEDURE — 67028 INJECTION EYE DRUG: CPT | Mod: S$PBB,RT,, | Performed by: OPHTHALMOLOGY

## 2019-07-29 PROCEDURE — 67028 PR INJECT INTRAVITREAL PHARMCOLOGIC: ICD-10-PCS | Mod: S$PBB,RT,, | Performed by: OPHTHALMOLOGY

## 2019-07-29 PROCEDURE — 92134 POSTERIOR SEGMENT OCT RETINA (OCULAR COHERENCE TOMOGRAPHY)-BOTH EYES: ICD-10-PCS | Mod: 26,S$PBB,, | Performed by: OPHTHALMOLOGY

## 2019-07-29 PROCEDURE — 92014 COMPRE OPH EXAM EST PT 1/>: CPT | Mod: 25,S$PBB,, | Performed by: OPHTHALMOLOGY

## 2019-07-29 PROCEDURE — 92014 PR EYE EXAM, EST PATIENT,COMPREHESV: ICD-10-PCS | Mod: 25,S$PBB,, | Performed by: OPHTHALMOLOGY

## 2019-07-29 PROCEDURE — 67028 INJECTION EYE DRUG: CPT | Mod: PBBFAC,PO,RT | Performed by: OPHTHALMOLOGY

## 2019-07-29 RX ADMIN — AFLIBERCEPT 2 MG: 40 INJECTION, SOLUTION INTRAVITREAL at 02:07

## 2019-07-29 NOTE — PROGRESS NOTES
HPI     12 wk/OCT  DLS-04/29/2019 Dr. Sue     Denies changes or pain in eyes currently doing well.     (-)Flashes (-)Floaters  (-)Photophobia  (-)Glare      No gtts         OCT - OD  PED's with SRF  OS - SR Fibrosis - NO SRF      A/P    1. Wet AMD OS  S/p Avastin injections outside  Stable today with cicatrix  Observe    2. Wet AMD OD  S/p Avastin OD x 13  S/p Eylea OD x 10    Eylea OD    Alternate tx      AREDS/AG    3. DM  No significant DR  BS/BP/chol control    4. PCIOL OU      12 weeks OCT      Risks, benefits, and alternatives to treatment discussed in detail with the patient.  The patient voiced understanding and wished to proceed with the procedure    Injection Procedure Note:  Diagnosis: Wet AMD OD    Patient Identified and Time Out complete  Topical Proparacaine and Betadine.  Inject Eylea OD at 6:00 @ 3.5-4mm posterior to limbus  Post Operative Dx: Same  Complications: None  Follow up as above.

## 2019-07-30 NOTE — PATIENT INSTRUCTIONS

## 2019-08-06 ENCOUNTER — OFFICE VISIT (OUTPATIENT)
Dept: PODIATRY | Facility: CLINIC | Age: 79
End: 2019-08-06
Payer: MEDICARE

## 2019-08-06 VITALS — WEIGHT: 184.63 LBS | HEIGHT: 63 IN | BODY MASS INDEX: 32.71 KG/M2

## 2019-08-06 DIAGNOSIS — L84 CORN OR CALLUS: ICD-10-CM

## 2019-08-06 DIAGNOSIS — M20.41 HAMMER TOES OF BOTH FEET: ICD-10-CM

## 2019-08-06 DIAGNOSIS — B35.1 ONYCHOMYCOSIS DUE TO DERMATOPHYTE: ICD-10-CM

## 2019-08-06 DIAGNOSIS — E11.621 DIABETIC ULCER OF TOE OF RIGHT FOOT ASSOCIATED WITH TYPE 2 DIABETES MELLITUS, LIMITED TO BREAKDOWN OF SKIN: ICD-10-CM

## 2019-08-06 DIAGNOSIS — M20.42 HAMMER TOES OF BOTH FEET: ICD-10-CM

## 2019-08-06 DIAGNOSIS — E11.42 DIABETIC POLYNEUROPATHY ASSOCIATED WITH TYPE 2 DIABETES MELLITUS: Primary | ICD-10-CM

## 2019-08-06 DIAGNOSIS — L97.511 DIABETIC ULCER OF TOE OF RIGHT FOOT ASSOCIATED WITH TYPE 2 DIABETES MELLITUS, LIMITED TO BREAKDOWN OF SKIN: ICD-10-CM

## 2019-08-06 PROCEDURE — 87076 CULTURE ANAEROBE IDENT EACH: CPT

## 2019-08-06 PROCEDURE — 99212 OFFICE O/P EST SF 10 MIN: CPT | Mod: PBBFAC,PN,25 | Performed by: PODIATRIST

## 2019-08-06 PROCEDURE — 99499 UNLISTED E&M SERVICE: CPT | Mod: S$PBB,,, | Performed by: PODIATRIST

## 2019-08-06 PROCEDURE — 97597 DBRDMT OPN WND 1ST 20 CM/<: CPT | Mod: PBBFAC,PN | Performed by: PODIATRIST

## 2019-08-06 PROCEDURE — 99499 NO LOS: ICD-10-PCS | Mod: S$PBB,,, | Performed by: PODIATRIST

## 2019-08-06 PROCEDURE — 87077 CULTURE AEROBIC IDENTIFY: CPT

## 2019-08-06 PROCEDURE — 87186 SC STD MICRODIL/AGAR DIL: CPT

## 2019-08-06 PROCEDURE — 99999 PR PBB SHADOW E&M-EST. PATIENT-LVL II: CPT | Mod: PBBFAC,,, | Performed by: PODIATRIST

## 2019-08-06 PROCEDURE — 97597 WOUND DEBRIDEMENT: ICD-10-PCS | Mod: S$PBB,,, | Performed by: PODIATRIST

## 2019-08-06 PROCEDURE — 99999 PR PBB SHADOW E&M-EST. PATIENT-LVL II: ICD-10-PCS | Mod: PBBFAC,,, | Performed by: PODIATRIST

## 2019-08-06 PROCEDURE — 87070 CULTURE OTHR SPECIMN AEROBIC: CPT

## 2019-08-06 PROCEDURE — 87075 CULTR BACTERIA EXCEPT BLOOD: CPT

## 2019-08-06 NOTE — PROGRESS NOTES
Patient ID: Rebeca Turner is a 79 y.o. female.    Chief Complaint: Diabetes Mellitus (Johan july 19 th) and Diabetic Foot Exam    Rebeca is a 79 y.o. female who presents to the clinic for evaluation and treatment of diabetic feet. Rebeca has a past medical history of Anemia of chronic disease, CKD (chronic kidney disease) stage 3, GFR 30-59 ml/min, DM type 2 (diabetes mellitus, type 2), DVT (deep venous thrombosis), Dysplastic nevi, Fatty liver, GERD (gastroesophageal reflux disease), H/O esophagogastroduodenoscopy, History of endometrial cancer, HTN (hypertension), Hyperlipidemia, LVE (left ventricular enlargement), Macular degeneration, MR (mental retardation), Obesity, LUISANA (obstructive sleep apnea), Osteoporosis, S/P colonoscopy, SBO (small bowel obstruction), Seizure disorder, and Vitreous detachment. Patient's chief complaint consists of mild pain from the Rt. 2nd toe.  States pain symptoms are localized to the tip of the digit.  Describes pain as sharp and rates as a 0/10.  Symptoms are exacerbated with weight bearing and directly applied pressure to the toe.  Symptoms are alleviated with rest.  Has not attempted to address this issue outside of applying moisturizer to the site.  Denies noticing drainage from the digit.  Denies any additional pedal complaints.    PCP: JUSTICE Prado MD    Date Last Seen by PCP: July 2019    Hemoglobin A1C   Date Value Ref Range Status   06/05/2019 7.0 (H) 4.0 - 5.6 % Final     Comment:     ADA Screening Guidelines:  5.7-6.4%  Consistent with prediabetes  >or=6.5%  Consistent with diabetes  High levels of fetal hemoglobin interfere with the HbA1C  assay. Heterozygous hemoglobin variants (HbS, HgC, etc)do  not significantly interfere with this assay.   However, presence of multiple variants may affect accuracy.     12/14/2018 6.2 (H) 4.0 - 5.6 % Final     Comment:     ADA Screening Guidelines:  5.7-6.4%  Consistent with prediabetes  >or=6.5%  Consistent with diabetes  High  levels of fetal hemoglobin interfere with the HbA1C  assay. Heterozygous hemoglobin variants (HbS, HgC, etc)do  not significantly interfere with this assay.   However, presence of multiple variants may affect accuracy.     01/18/2018 6.5 (H) 4.0 - 5.6 % Final     Comment:     According to ADA guidelines, hemoglobin A1c <7.0% represents  optimal control in non-pregnant diabetic patients. Different  metrics may apply to specific patient populations.   Standards of Medical Care in Diabetes-2016.  For the purpose of screening for the presence of diabetes:  <5.7%     Consistent with the absence of diabetes  5.7-6.4%  Consistent with increasing risk for diabetes   (prediabetes)  >or=6.5%  Consistent with diabetes  Currently, no consensus exists for use of hemoglobin A1c  for diagnosis of diabetes for children.  This Hemoglobin A1c assay has significant interference with fetal   hemoglobin   (HbF). The results are invalid for patients with abnormal amounts of   HbF,   including those with known Hereditary Persistence   of Fetal Hemoglobin. Heterozygous hemoglobin variants (HbAS, HbAC,   HbAD, HbAE, HbA2) do not significantly interfere with this assay;   however, presence of multiple variants in a sample may impact the %   interference.             Past Medical History:   Diagnosis Date    Anemia of chronic disease     hematology su neg    CKD (chronic kidney disease) stage 3, GFR 30-59 ml/min     DM type 2 (diabetes mellitus, type 2)     DVT (deep venous thrombosis)     x 2 both post-op with last 6/16    Dysplastic nevi     h/o    Fatty liver     noted on usg 9/15    GERD (gastroesophageal reflux disease)     H/O esophagogastroduodenoscopy     normal 11/10    History of endometrial cancer     stage 1;  s/p SENDY with BSO 10/06    HTN (hypertension)     Hyperlipidemia     LVE (left ventricular enlargement)     noted on 6/15 ECHO    Macular degeneration     exudative    MR (mental retardation)     Obesity      LUISANA (obstructive sleep apnea)     su in progress    Osteoporosis     osteopenia noted on 2/13 and 7/15 dexa    S/P colonoscopy     last 10/15;  next 10/19    SBO (small bowel obstruction)     h/o recurrent SBO;  s/p R hemicolectomy 12/10 and s/p incarcerated hernia repair with extensive scar tissue 6/16    Seizure disorder     Vitreous detachment        Past Surgical History:   Procedure Laterality Date    ABDOMINAL SURGERY  06/2016    Incarcerated incisional hernia, enterotomy x3, and extensive adhesions      CATARACT EXTRACTION      B 6/11    CHOLECYSTECTOMY      COLON SURGERY  12/6/2010  ([place], Ms.)    R hemicolectomy 12/10 due to recurrent SBO from benign colon mass 12/2010.   Benign albeit large polyp.    COLONOSCOPY N/A 10/7/2015    Performed by Will Cr [facility]    COLONOSCOPY W/ POLYPECTOMY  11/22/2010  [place], Ms.    3 cm growth mid ascending colon.  TUBULOVILLOUS ADENOMA. 9 mm polyp mid transverse colon.  TUBULAR ADENOMA.  10 mm polyp distal descending colon.  TUBULOVILLOUS ADENOMA.     ESOPHAGOGASTRODUODENOSCOPY  11/16/2010    Normal EGD.    HERNIA REPAIR  8/2015    from incarcerated hernia    HYSTERECTOMY  10/17/2006    SENDY and BSO 10/06 for stage 1 endometrial cancer    MYRINGOTOMY W/ TUBES      bilateral    REPAIR-HERNIA-INCISIONAL  6/2/2016    [facility]    REPAIR-HERNIA-LAPAROSCOPIC-INCISIONAL N/A 6/2/2016    [facility]    TONSILLECTOMY         Family History   Problem Relation Age of Onset    Cancer Sister         details unknown    Heart attack Father     Pulmonary embolism Mother        Social History     Socioeconomic History    Marital status: Single     Spouse name: Not on file    Number of children: Not on file    Years of education: Not on file    Highest education level: Not on file   Occupational History    Not on file   Social Needs    Financial resource strain: Not on  file    Food insecurity:     Worry: Not on file     Inability: Not on file    Transportation needs:     Medical: Not on file     Non-medical: Not on file   Tobacco Use    Smoking status: Never Smoker    Smokeless tobacco: Never Used   Substance and Sexual Activity    Alcohol use: No     Alcohol/week: 0.0 oz    Drug use: No    Sexual activity: Never   Lifestyle    Physical activity:     Days per week: Not on file     Minutes per session: Not on file    Stress: Not on file   Relationships    Social connections:     Talks on phone: Not on file     Gets together: Not on file     Attends Islam service: Not on file     Active member of club or organization: Not on file     Attends meetings of clubs or organizations: Not on file     Relationship status: Not on file   Other Topics Concern    Not on file   Social History Narrative    Lives at [facility].       Current Outpatient Prescriptions   Medication Sig Dispense Refill    alendronate (FOSAMAX) 70 MG tablet TAKE ONE TABLET WEEKLY AS DIRECTED ON THURSDAY 4 tablet 0    calcium carbonate-vitamin D3 (CALCIUM 600 + D,3,) 600 mg calcium- 200 unit Cap Take 1 capsule by mouth 2 (two) times daily.      CRESTOR 10 mg tablet TAKE ONE TABLET BY MOUTH EVERY NIGHT AT BEDTIME 30 tablet 0    diltiaZEM (CARDIZEM CD) 360 MG 24 hr capsule       dimethicone-kelvin-A-C-E-aloe (GOLD BOND ULTIMATE DIABETICS') 3-30 % Crea Apply topically 2 (two) times daily. APPLY TO FEET      docusate sodium (COLACE) 100 MG capsule Take 100 mg by mouth 2 (two) times daily.      fish oil-omega-3 fatty acids 300-1,000 mg capsule Take 2 g by mouth once daily.      hydrALAZINE (APRESOLINE) 50 MG tablet Take 50 mg by mouth every 12 (twelve) hours.      hydroCHLOROthiazide (HYDRODIURIL) 25 MG tablet       iron-vitamin C 100-250 mg, ICAR-C, 100-250 mg Tab Take one tablet in the morning with an acidic drink before breakfast. 90 tablet 0    labetalol (NORMODYNE) 300 MG tablet TAKE ONE TABLET BY  MOUTH TWICE DAILY FOR BLOOD PRESSURE 60 tablet 0    LANTUS SOLOSTAR 100 unit/mL (3 mL) InPn pen Inject 35 Units into the skin every evening.       lisinopril (PRINIVIL,ZESTRIL) 20 MG tablet       lutein 20 mg Cap TAKE ONE SOFTGEL BY MOUTH EVERY MORNING 30 each 0    multivitamin capsule Take 1 capsule by mouth once daily.      polyethylene glycol (GLYCOLAX) 17 gram/dose powder       TEGRETOL  mg 12 hr tablet TAKE ONE TABLET BY MOUTH ONCE DAILY SEIZURES (Patient taking differently: one tablet bid) 30 tablet 0     No current facility-administered medications for this visit.        Review of patient's allergies indicates:  No Known Allergies      Review of Systems   Constitution: Negative for chills and fever.   Cardiovascular: Negative for claudication and leg swelling.   Skin: Positive for color change, nail changes and suspicious lesions.   Musculoskeletal: Negative for arthritis, joint pain and joint swelling.   Gastrointestinal: Negative for nausea and vomiting.   Neurological: Positive for numbness. Negative for paresthesias.   Psychiatric/Behavioral: Negative for altered mental status.           Objective:      Physical Exam   Constitutional: She is oriented to person, place, and time. She appears well-developed and well-nourished. No distress.   Cardiovascular:   Pulses:       Dorsalis pedis pulses are 2+ on the right side, and 2+ on the left side.        Posterior tibial pulses are 1+ on the right side, and 1+ on the left side.   CFT <3 seconds bilateral.  Pedal hair growth decreased bilateral.  Varicosities noted to bilateral lower extremity.  Mild nonpitting edema noted to bilateral lower extremity.  Toes are cool to touch bilateral.     Musculoskeletal: She exhibits edema and tenderness.   Muscle strength 5/5 in all muscle groups bilateral.  No tenderness nor crepitation with ROM of foot/ankle joints bilateral.  Pain with palpation to the wound of the Rt. 2nd toe. Bilateral pes planus foot type.   Bilateral semi-rigid contracture of toes 2-5.   Neurological: She is alert and oriented to person, place, and time. She has normal strength. A sensory deficit is present.   Protective sensation per Lincoln-Julianne monofilament decreased bilateral.    Vibratory sensation decreased bilateral.    Light touch intact bilateral.   Skin: Skin is warm and dry. Capillary refill takes less than 2 seconds. Lesion noted. No abrasion, no bruising, no burn, no ecchymosis, no laceration, no petechiae and no rash noted. She is not diaphoretic. No cyanosis or erythema. No pallor. Nails show no clubbing.   Location: Open wound noted to the distal tip of the Rt. 2nd toe.  Base: Down to dermis and comprised of fibrin.  Drainage: None  Minda wound: Devoid of erythema, edema, fluctuance, purulence, and malodor.   Pre-debridement measurement: 0.5 x 0.2 x 0.1cm  Post-debridement measurement: 0.7 x 0.4 x 0.1cm             Assessment:       Encounter Diagnoses   Name Primary?    Diabetic polyneuropathy associated with type 2 diabetes mellitus Yes    Hammer toes of both feet     Onychomycosis due to dermatophyte     Corn or callus     Diabetic ulcer of toe of right foot associated with type 2 diabetes mellitus, limited to breakdown of skin          Plan:       Rebeca was seen today for diabetes mellitus and diabetic foot exam.    Diagnoses and all orders for this visit:    Diabetic polyneuropathy associated with type 2 diabetes mellitus    Hammer toes of both feet    Onychomycosis due to dermatophyte    Corn or callus    Diabetic ulcer of toe of right foot associated with type 2 diabetes mellitus, limited to breakdown of skin  -     Aerobic culture  -     Culture, Anaerobic  -     Wound Debridement      I counseled the patient on her conditions, their implications and medical management.    Discussed with patient the associated risks and benefits associated with a selective excisional debridement of the Rt. 2nd toe.   Consent was read,  signed, and witnessed.  See attached procedure note.      Wound cultures were obtained.    The wound base was covered with iodosorb ointment.  The site was then offloaded with a toe sulcus roll, and a football dressing was applied.    Advised to keep the dressing CDI x 1 week.    Advised to rest and elevate the affected extremity.    Instructed to minimize weight bearing to facilitate wound healing.    Advised to ambulate only in the postoperative shoe/boot.    Discussed optimal hydration and protein consumption and how they facilitate wound healing.      Will trim calluses and toenails once the wound of the Rt. 2nd toe has been resolved.    RTC in 1 week for a wound check.    Sae Snowden DPM

## 2019-08-07 NOTE — PROCEDURES
"Wound Debridement  Date/Time: 8/6/2019 9:28 PM  Performed by: Sae Snowden DPM  Authorized by: Sae Snowden DPM     Time out: Immediately prior to procedure a "time out" was called to verify the correct patient, procedure, equipment, support staff and site/side marked as required.    Consent Done?:  Yes (Written)    Preparation: Patient was prepped and draped in usual sterile fashion    Local anesthesia used?: No      Wound Details:    Location:  Right foot    Location:  Right 2nd Toe    Type of Debridement:  Excisional       Length (cm):  0.5       Area (sq cm):  0.1       Width (cm):  0.2       Percent Debrided (%):  100       Depth (cm):  0.1       Total Area Debrided (sq cm):  0.1    Depth of debridement:  Epidermis/Dermis    Tissue debrided:  Dermis    Devitalized tissue debrided:  Fibrin and Callus    Instruments:  Blade    Bleeding:  Minimal  Hemostasis Achieved: Yes    Method Used:  Pressure  Patient tolerance:  Patient tolerated the procedure well with no immediate complications      "

## 2019-08-10 LAB — BACTERIA SPEC AEROBE CULT: ABNORMAL

## 2019-08-12 LAB — BACTERIA SPEC ANAEROBE CULT: ABNORMAL

## 2019-08-14 ENCOUNTER — OFFICE VISIT (OUTPATIENT)
Dept: PODIATRY | Facility: CLINIC | Age: 79
End: 2019-08-14
Payer: MEDICARE

## 2019-08-14 VITALS
BODY MASS INDEX: 32.15 KG/M2 | DIASTOLIC BLOOD PRESSURE: 69 MMHG | HEART RATE: 67 BPM | HEIGHT: 63 IN | SYSTOLIC BLOOD PRESSURE: 180 MMHG | RESPIRATION RATE: 14 BRPM | WEIGHT: 181.44 LBS

## 2019-08-14 DIAGNOSIS — E11.42 DIABETIC POLYNEUROPATHY ASSOCIATED WITH TYPE 2 DIABETES MELLITUS: ICD-10-CM

## 2019-08-14 DIAGNOSIS — Z87.2 HEALED ULCER OF RIGHT FOOT ON EXAMINATION: Primary | ICD-10-CM

## 2019-08-14 PROCEDURE — 99999 PR PBB SHADOW E&M-EST. PATIENT-LVL IV: CPT | Mod: PBBFAC,,, | Performed by: PODIATRIST

## 2019-08-14 PROCEDURE — 99999 PR PBB SHADOW E&M-EST. PATIENT-LVL IV: ICD-10-PCS | Mod: PBBFAC,,, | Performed by: PODIATRIST

## 2019-08-14 PROCEDURE — 99214 OFFICE O/P EST MOD 30 MIN: CPT | Mod: PBBFAC,PN | Performed by: PODIATRIST

## 2019-08-14 PROCEDURE — 99212 PR OFFICE/OUTPT VISIT, EST, LEVL II, 10-19 MIN: ICD-10-PCS | Mod: S$PBB,,, | Performed by: PODIATRIST

## 2019-08-14 PROCEDURE — 99212 OFFICE O/P EST SF 10 MIN: CPT | Mod: S$PBB,,, | Performed by: PODIATRIST

## 2019-08-14 NOTE — PROGRESS NOTES
Patient ID: Rebeca Turner is a 79 y.o. female.    Chief Complaint: Foot Ulcer    Patient presents to clinic for a 1 week wound check regarding the Rt. 2nd toe.  Denies experiencing pain from the digit with today's exam.  Has kept the previous clinic dressing clean, dry, and intact x 1 week with minimal ambulation in the postoperative shoe.  Denies having N/V/F/C/D.  Denies any additional pedal complaints.    Hemoglobin A1C   Date Value Ref Range Status   06/05/2019 7.0 (H) 4.0 - 5.6 % Final     Comment:     ADA Screening Guidelines:  5.7-6.4%  Consistent with prediabetes  >or=6.5%  Consistent with diabetes  High levels of fetal hemoglobin interfere with the HbA1C  assay. Heterozygous hemoglobin variants (HbS, HgC, etc)do  not significantly interfere with this assay.   However, presence of multiple variants may affect accuracy.     12/14/2018 6.2 (H) 4.0 - 5.6 % Final     Comment:     ADA Screening Guidelines:  5.7-6.4%  Consistent with prediabetes  >or=6.5%  Consistent with diabetes  High levels of fetal hemoglobin interfere with the HbA1C  assay. Heterozygous hemoglobin variants (HbS, HgC, etc)do  not significantly interfere with this assay.   However, presence of multiple variants may affect accuracy.     01/18/2018 6.5 (H) 4.0 - 5.6 % Final     Comment:     According to ADA guidelines, hemoglobin A1c <7.0% represents  optimal control in non-pregnant diabetic patients. Different  metrics may apply to specific patient populations.   Standards of Medical Care in Diabetes-2016.  For the purpose of screening for the presence of diabetes:  <5.7%     Consistent with the absence of diabetes  5.7-6.4%  Consistent with increasing risk for diabetes   (prediabetes)  >or=6.5%  Consistent with diabetes  Currently, no consensus exists for use of hemoglobin A1c  for diagnosis of diabetes for children.  This Hemoglobin A1c assay has significant interference with fetal   hemoglobin   (HbF). The results are invalid for  patients with abnormal amounts of   HbF,   including those with known Hereditary Persistence   of Fetal Hemoglobin. Heterozygous hemoglobin variants (HbAS, HbAC,   HbAD, HbAE, HbA2) do not significantly interfere with this assay;   however, presence of multiple variants in a sample may impact the %   interference.             Past Medical History:   Diagnosis Date    Anemia of chronic disease     hematology su neg    CKD (chronic kidney disease) stage 3, GFR 30-59 ml/min     DM type 2 (diabetes mellitus, type 2)     DVT (deep venous thrombosis)     x 2 both post-op with last 6/16    Dysplastic nevi     h/o    Fatty liver     noted on usg 9/15    GERD (gastroesophageal reflux disease)     H/O esophagogastroduodenoscopy     normal 11/10    History of endometrial cancer     stage 1;  s/p SENDY with BSO 10/06    HTN (hypertension)     Hyperlipidemia     LVE (left ventricular enlargement)     noted on 6/15 ECHO    Macular degeneration     exudative    MR (mental retardation)     Obesity     LUISANA (obstructive sleep apnea)     su in progress    Osteoporosis     osteopenia noted on 2/13 and 7/15 dexa    S/P colonoscopy     last 10/15;  next 10/19    SBO (small bowel obstruction)     h/o recurrent SBO;  s/p R hemicolectomy 12/10 and s/p incarcerated hernia repair with extensive scar tissue 6/16    Seizure disorder     Vitreous detachment        Past Surgical History:   Procedure Laterality Date    ABDOMINAL SURGERY  06/2016    Incarcerated incisional hernia, enterotomy x3, and extensive adhesions      CATARACT EXTRACTION      B 6/11    CHOLECYSTECTOMY      COLON SURGERY  12/6/2010  (Ms Dinorah.)    R hemicolectomy 12/10 due to recurrent SBO from benign colon mass 12/2010.   Benign albeit large polyp.    COLONOSCOPY N/A 10/7/2015    [facility] Cr [facility]    COLONOSCOPY W/ POLYPECTOMY  11/22/2010  Ms Dinorah.    3 cm growth mid ascending colon.  TUBULOVILLOUS ADENOMA. 9 mm  polyp mid transverse colon.  TUBULAR ADENOMA.  10 mm polyp distal descending colon.  TUBULOVILLOUS ADENOMA.     ESOPHAGOGASTRODUODENOSCOPY  11/16/2010    Normal EGD.    HERNIA REPAIR  8/2015    from incarcerated hernia    HYSTERECTOMY  10/17/2006    SENDY and BSO 10/06 for stage 1 endometrial cancer    MYRINGOTOMY W/ TUBES      bilateral    REPAIR-HERNIA-INCISIONAL  6/2/2016    [facility]    REPAIR-HERNIA-LAPAROSCOPIC-INCISIONAL N/A 6/2/2016    [facility] at [facility] OR    TONSILLECTOMY         Family History   Problem Relation Age of Onset    Cancer Sister         details unknown    Heart attack Father     Pulmonary embolism Mother        Social History     Socioeconomic History    Marital status: Single     Spouse name: Not on file    Number of children: Not on file    Years of education: Not on file    Highest education level: Not on file   Occupational History    Not on file   Social Needs    Financial resource strain: Not on file    Food insecurity:     Worry: Not on file     Inability: Not on file    Transportation needs:     Medical: Not on file     Non-medical: Not on file   Tobacco Use    Smoking status: Never Smoker    Smokeless tobacco: Never Used   Substance and Sexual Activity    Alcohol use: No     Alcohol/week: 0.0 oz    Drug use: No    Sexual activity: Never   Lifestyle    Physical activity:     Days per week: Not on file     Minutes per session: Not on file    Stress: Not on file   Relationships    Social connections:     Talks on phone: Not on file     Gets together: Not on file     Attends Hoahaoism service: Not on file     Active member of club or organization: Not on file     Attends meetings of clubs or organizations: Not on file     Relationship status: Not on file   Other Topics Concern    Not on file   Social History Narrative    Lives at [facility].       Current Outpatient Prescriptions   Medication Sig Dispense  Refill    alendronate (FOSAMAX) 70 MG tablet TAKE ONE TABLET WEEKLY AS DIRECTED ON THURSDAY 4 tablet 0    calcium carbonate-vitamin D3 (CALCIUM 600 + D,3,) 600 mg calcium- 200 unit Cap Take 1 capsule by mouth 2 (two) times daily.      CRESTOR 10 mg tablet TAKE ONE TABLET BY MOUTH EVERY NIGHT AT BEDTIME 30 tablet 0    diltiaZEM (CARDIZEM CD) 360 MG 24 hr capsule       dimethicone-kelvin-A-C-E-aloe (GOLD BOND ULTIMATE DIABETICS') 3-30 % Crea Apply topically 2 (two) times daily. APPLY TO FEET      docusate sodium (COLACE) 100 MG capsule Take 100 mg by mouth 2 (two) times daily.      fish oil-omega-3 fatty acids 300-1,000 mg capsule Take 2 g by mouth once daily.      hydrALAZINE (APRESOLINE) 50 MG tablet Take 50 mg by mouth every 12 (twelve) hours.      hydroCHLOROthiazide (HYDRODIURIL) 25 MG tablet       iron-vitamin C 100-250 mg, ICAR-C, 100-250 mg Tab Take one tablet in the morning with an acidic drink before breakfast. 90 tablet 0    labetalol (NORMODYNE) 300 MG tablet TAKE ONE TABLET BY MOUTH TWICE DAILY FOR BLOOD PRESSURE 60 tablet 0    LANTUS SOLOSTAR 100 unit/mL (3 mL) InPn pen Inject 35 Units into the skin every evening.       lisinopril (PRINIVIL,ZESTRIL) 20 MG tablet       lutein 20 mg Cap TAKE ONE SOFTGEL BY MOUTH EVERY MORNING 30 each 0    multivitamin capsule Take 1 capsule by mouth once daily.      polyethylene glycol (GLYCOLAX) 17 gram/dose powder       TEGRETOL  mg 12 hr tablet TAKE ONE TABLET BY MOUTH ONCE DAILY SEIZURES (Patient taking differently: one tablet bid) 30 tablet 0     No current facility-administered medications for this visit.        Review of patient's allergies indicates:  No Known Allergies      Review of Systems   Constitution: Negative for chills and fever.   Cardiovascular: Negative for claudication and leg swelling.   Skin: Positive for color change and nail changes. Negative for suspicious lesions.   Musculoskeletal: Negative for arthritis, joint pain and  joint swelling.   Gastrointestinal: Negative for nausea and vomiting.   Neurological: Positive for numbness. Negative for paresthesias.   Psychiatric/Behavioral: Negative for altered mental status.           Objective:      Physical Exam   Constitutional: She is oriented to person, place, and time. She appears well-developed and well-nourished. No distress.   Cardiovascular:   Pulses:       Dorsalis pedis pulses are 2+ on the right side, and 2+ on the left side.        Posterior tibial pulses are 1+ on the right side, and 1+ on the left side.   CFT <3 seconds bilateral.  Pedal hair growth decreased bilateral.  Varicosities noted to bilateral lower extremity.  Mild nonpitting edema noted to bilateral lower extremity.  Toes are cool to touch bilateral.     Musculoskeletal: She exhibits edema. She exhibits no tenderness.   Muscle strength 5/5 in all muscle groups bilateral.  No tenderness nor crepitation with ROM of foot/ankle joints bilateral.  No pain with palpation of bilateral foot and ankle.  Bilateral pes planus foot type.  Bilateral semi-rigid contracture of toes 2-5.   Neurological: She is alert and oriented to person, place, and time. She has normal strength. A sensory deficit is present.   Protective sensation per [place]ne monofilament decreased bilateral.    Vibratory sensation decreased bilateral.    Light touch intact bilateral.   Skin: Skin is warm, dry and intact. Capillary refill takes less than 2 seconds. No abrasion, no bruising, no burn, no ecchymosis, no laceration, no lesion, no petechiae and no rash noted. She is not diaphoretic. No cyanosis or erythema. No pallor. Nails show no clubbing.   Fragile epithelium noted to the distal tip of the Rt. 2nd toe.               Assessment:       Encounter Diagnoses   Name Primary?    Diabetic polyneuropathy associated with type 2 diabetes mellitus     Healed ulcer of right foot on examination Yes         Plan:       Rebeca was seen today for foot  ulcer.    Diagnoses and all orders for this visit:    Healed ulcer of right foot on examination    Diabetic polyneuropathy associated with type 2 diabetes mellitus      I counseled the patient on her conditions, their implications and medical management.    No wound debridement performed, as the site is fully epithelialized.      No oral antibiotics warranted, as the iodosorb ointment resolved the infection.    The prior wound site was offloaded with a toe sulcus roll, and a football dressing was applied to allow the skin at this site a chance to further mature.    Advised to keep the dressing CDI x 1 week.    Advised to ambulate only in the postoperative shoe/boot.    RTC in 1 week for a follow up.    Sae Snowden DPM

## 2019-08-21 ENCOUNTER — OFFICE VISIT (OUTPATIENT)
Dept: PODIATRY | Facility: CLINIC | Age: 79
End: 2019-08-21
Payer: MEDICARE

## 2019-08-21 VITALS
BODY MASS INDEX: 32.07 KG/M2 | WEIGHT: 181 LBS | SYSTOLIC BLOOD PRESSURE: 175 MMHG | HEART RATE: 63 BPM | HEIGHT: 63 IN | DIASTOLIC BLOOD PRESSURE: 55 MMHG

## 2019-08-21 DIAGNOSIS — E11.42 DIABETIC POLYNEUROPATHY ASSOCIATED WITH TYPE 2 DIABETES MELLITUS: ICD-10-CM

## 2019-08-21 DIAGNOSIS — M20.42 HAMMER TOES OF BOTH FEET: ICD-10-CM

## 2019-08-21 DIAGNOSIS — Z87.2 HEALED ULCER OF RIGHT FOOT ON EXAMINATION: Primary | ICD-10-CM

## 2019-08-21 DIAGNOSIS — M20.41 HAMMER TOES OF BOTH FEET: ICD-10-CM

## 2019-08-21 PROCEDURE — 99999 PR PBB SHADOW E&M-EST. PATIENT-LVL IV: CPT | Mod: PBBFAC,,, | Performed by: PODIATRIST

## 2019-08-21 PROCEDURE — 99212 PR OFFICE/OUTPT VISIT, EST, LEVL II, 10-19 MIN: ICD-10-PCS | Mod: S$PBB,,, | Performed by: PODIATRIST

## 2019-08-21 PROCEDURE — 99212 OFFICE O/P EST SF 10 MIN: CPT | Mod: S$PBB,,, | Performed by: PODIATRIST

## 2019-08-21 PROCEDURE — 99214 OFFICE O/P EST MOD 30 MIN: CPT | Mod: PBBFAC,PN | Performed by: PODIATRIST

## 2019-08-21 PROCEDURE — 99999 PR PBB SHADOW E&M-EST. PATIENT-LVL IV: ICD-10-PCS | Mod: PBBFAC,,, | Performed by: PODIATRIST

## 2019-08-21 NOTE — PROGRESS NOTES
Patient ID: Rebeca Turner is a 79 y.o. female.    Chief Complaint: Wound Check (R foot wound Dr Prado 10/2018 7.0 6/2019)    Patient presents to clinic for a 1 week wound check regarding the Rt. 2nd toe.  Denies experiencing pain from the digit with today's exam.  Has kept the previous clinic dressing clean, dry, and intact x 1 week with minimal ambulation in the postoperative shoe.  Denies having N/V/F/C/D.  Denies any additional pedal complaints.    Hemoglobin A1C   Date Value Ref Range Status   06/05/2019 7.0 (H) 4.0 - 5.6 % Final     Comment:     ADA Screening Guidelines:  5.7-6.4%  Consistent with prediabetes  >or=6.5%  Consistent with diabetes  High levels of fetal hemoglobin interfere with the HbA1C  assay. Heterozygous hemoglobin variants (HbS, HgC, etc)do  not significantly interfere with this assay.   However, presence of multiple variants may affect accuracy.     12/14/2018 6.2 (H) 4.0 - 5.6 % Final     Comment:     ADA Screening Guidelines:  5.7-6.4%  Consistent with prediabetes  >or=6.5%  Consistent with diabetes  High levels of fetal hemoglobin interfere with the HbA1C  assay. Heterozygous hemoglobin variants (HbS, HgC, etc)do  not significantly interfere with this assay.   However, presence of multiple variants may affect accuracy.     01/18/2018 6.5 (H) 4.0 - 5.6 % Final     Comment:     According to ADA guidelines, hemoglobin A1c <7.0% represents  optimal control in non-pregnant diabetic patients. Different  metrics may apply to specific patient populations.   Standards of Medical Care in Diabetes-2016.  For the purpose of screening for the presence of diabetes:  <5.7%     Consistent with the absence of diabetes  5.7-6.4%  Consistent with increasing risk for diabetes   (prediabetes)  >or=6.5%  Consistent with diabetes  Currently, no consensus exists for use of hemoglobin A1c  for diagnosis of diabetes for children.  This Hemoglobin A1c assay has significant interference with fetal    hemoglobin   (HbF). The results are invalid for patients with abnormal amounts of   HbF,   including those with known Hereditary Persistence   of Fetal Hemoglobin. Heterozygous hemoglobin variants (HbAS, HbAC,   HbAD, HbAE, HbA2) do not significantly interfere with this assay;   however, presence of multiple variants in a sample may impact the %   interference.             Past Medical History:   Diagnosis Date    Anemia of chronic disease     hematology su neg    CKD (chronic kidney disease) stage 3, GFR 30-59 ml/min     DM type 2 (diabetes mellitus, type 2)     DVT (deep venous thrombosis)     x 2 both post-op with last 6/16    Dysplastic nevi     h/o    Fatty liver     noted on usg 9/15    GERD (gastroesophageal reflux disease)     H/O esophagogastroduodenoscopy     normal 11/10    History of endometrial cancer     stage 1;  s/p SENDY with BSO 10/06    HTN (hypertension)     Hyperlipidemia     LVE (left ventricular enlargement)     noted on 6/15 ECHO    Macular degeneration     exudative    MR (mental retardation)     Obesity     LUISANA (obstructive sleep apnea)     su in progress    Osteoporosis     osteopenia noted on 2/13 and 7/15 dexa    S/P colonoscopy     last 10/15;  next 10/19    SBO (small bowel obstruction)     h/o recurrent SBO;  s/p R hemicolectomy 12/10 and s/p incarcerated hernia repair with extensive scar tissue 6/16    Seizure disorder     Vitreous detachment        Past Surgical History:   Procedure Laterality Date    ABDOMINAL SURGERY  06/2016    Incarcerated incisional hernia, enterotomy x3, and extensive adhesions      CATARACT EXTRACTION      B 6/11    CHOLECYSTECTOMY      COLON SURGERY  12/6/2010  (Ms. Dinorah)    R hemicolectomy 12/10 due to recurrent SBO from benign colon mass 12/2010.   Benign albeit large polyp.    COLONOSCOPY N/A 10/7/2015    [facility]formed by Will Cr [facility] ENDO    COLONOSCOPY W/ POLYPECTOMY  11/22/2010  Ms Dinorah.    3 cm growth  mid ascending colon.  TUBULOVILLOUS ADENOMA. 9 mm polyp mid transverse colon.  TUBULAR ADENOMA.  10 mm polyp distal descending colon.  TUBULOVILLOUS ADENOMA.     ESOPHAGOGASTRODUODENOSCOPY  11/16/2010    Normal EGD.    HERNIA REPAIR  8/2015    from incarcerated hernia    HYSTERECTOMY  10/17/2006    SENDY and BSO 10/06 for stage 1 endometrial cancer    MYRINGOTOMY W/ TUBES      bilateral    REPAIR-HERNIA-INCISIONAL  6/2/2016    [facility]    REPAIR-HERNIA-LAPAROSCOPIC-INCISIONAL N/A 6/2/2016    Per[facility] by [facility] at [facility] OR    TONSILLECTOMY         Family History   Problem Relation Age of Onset    Cancer Sister         details unknown    Heart attack Father     Pulmonary embolism Mother        Social History     Socioeconomic History    Marital status: Single     Spouse name: Not on file    Number of children: Not on file    Years of education: Not on file    Highest education level: Not on file   Occupational History    Not on file   Social Needs    Financial resource strain: Not on file    Food insecurity:     Worry: Not on file     Inability: Not on file    Transportation needs:     Medical: Not on file     Non-medical: Not on file   Tobacco Use    Smoking status: Never Smoker    Smokeless tobacco: Never Used   Substance and Sexual Activity    Alcohol use: No     Alcohol/week: 0.0 oz    Drug use: No    Sexual activity: Never   Lifestyle    Physical activity:     Days per week: Not on file     Minutes per session: Not on file    Stress: Not on file   Relationships    Social connections:     Talks on phone: Not on file     Gets together: Not on file     Attends Yarsani service: Not on file     Active member of club or organization: Not on file     Attends meetings of clubs or organizations: Not on file     Relationship status: Not on file   Other Topics Concern    Not on file   Social History Narrative    Lives [facility].       Current  Outpatient Prescriptions   Medication Sig Dispense Refill    alendronate (FOSAMAX) 70 MG tablet TAKE ONE TABLET WEEKLY AS DIRECTED ON THURSDAY 4 tablet 0    calcium carbonate-vitamin D3 (CALCIUM 600 + D,3,) 600 mg calcium- 200 unit Cap Take 1 capsule by mouth 2 (two) times daily.      CRESTOR 10 mg tablet TAKE ONE TABLET BY MOUTH EVERY NIGHT AT BEDTIME 30 tablet 0    diltiaZEM (CARDIZEM CD) 360 MG 24 hr capsule       dimethicone-kelvin-A-C-E-aloe (GOLD BOND ULTIMATE DIABETICS') 3-30 % Crea Apply topically 2 (two) times daily. APPLY TO FEET      docusate sodium (COLACE) 100 MG capsule Take 100 mg by mouth 2 (two) times daily.      fish oil-omega-3 fatty acids 300-1,000 mg capsule Take 2 g by mouth once daily.      hydrALAZINE (APRESOLINE) 50 MG tablet Take 50 mg by mouth every 12 (twelve) hours.      hydroCHLOROthiazide (HYDRODIURIL) 25 MG tablet       iron-vitamin C 100-250 mg, ICAR-C, 100-250 mg Tab Take one tablet in the morning with an acidic drink before breakfast. 90 tablet 0    labetalol (NORMODYNE) 300 MG tablet TAKE ONE TABLET BY MOUTH TWICE DAILY FOR BLOOD PRESSURE 60 tablet 0    LANTUS SOLOSTAR 100 unit/mL (3 mL) InPn pen Inject 35 Units into the skin every evening.       lisinopril (PRINIVIL,ZESTRIL) 20 MG tablet       lutein 20 mg Cap TAKE ONE SOFTGEL BY MOUTH EVERY MORNING 30 each 0    multivitamin capsule Take 1 capsule by mouth once daily.      polyethylene glycol (GLYCOLAX) 17 gram/dose powder       TEGRETOL  mg 12 hr tablet TAKE ONE TABLET BY MOUTH ONCE DAILY SEIZURES (Patient taking differently: one tablet bid) 30 tablet 0     No current facility-administered medications for this visit.        Review of patient's allergies indicates:  No Known Allergies      Review of Systems   Constitution: Negative for chills and fever.   Cardiovascular: Negative for claudication and leg swelling.   Skin: Positive for color change and nail changes. Negative for suspicious lesions.    Musculoskeletal: Negative for arthritis, joint pain and joint swelling.   Gastrointestinal: Negative for nausea and vomiting.   Neurological: Positive for numbness. Negative for paresthesias.   Psychiatric/Behavioral: Negative for altered mental status.           Objective:      Physical Exam   Constitutional: She is oriented to person, place, and time. She appears well-developed and well-nourished. No distress.   Cardiovascular:   Pulses:       Dorsalis pedis pulses are 2+ on the right side, and 2+ on the left side.        Posterior tibial pulses are 1+ on the right side, and 1+ on the left side.   CFT <3 seconds bilateral.  Pedal hair growth decreased bilateral.  Varicosities noted to bilateral lower extremity.  Mild nonpitting edema noted to bilateral lower extremity.  Toes are cool to touch bilateral.     Musculoskeletal: She exhibits edema. She exhibits no tenderness.   Muscle strength 5/5 in all muscle groups bilateral.  No tenderness nor crepitation with ROM of foot/ankle joints bilateral.  No pain with palpation of bilateral foot and ankle.  Bilateral pes planus foot type.  Bilateral semi-rigid contracture of toes 2-5.   Neurological: She is alert and oriented to person, place, and time. She has normal strength. A sensory deficit is present.   Protective sensation per [place]-Julianne monofilament decreased bilateral.    Vibratory sensation decreased bilateral.    Light touch intact bilateral.   Skin: Skin is warm, dry and intact. Capillary refill takes less than 2 seconds. No abrasion, no bruising, no burn, no ecchymosis, no laceration, no lesion, no petechiae and no rash noted. She is not diaphoretic. No cyanosis or erythema. No pallor. Nails show no clubbing.   Mature epithelium noted to the distal tip of the Rt. 2nd toe.               Assessment:       Encounter Diagnoses   Name Primary?    Healed ulcer of right foot on examination Yes    Diabetic polyneuropathy associated with type 2 diabetes  mellitus     Hammer toes of both feet          Plan:       Rebeca was seen today for wound check.    Diagnoses and all orders for this visit:    Healed ulcer of right foot on examination    Diabetic polyneuropathy associated with type 2 diabetes mellitus    Hammer toes of both feet      I counseled the patient on her conditions, their implications and medical management.    No wound debridement performed, as the site is fully epithelialized.      Fitted and dispensed a crest pad to offload the former ulcerative site.  Advised to wear the pad with all ambulation.    Discussed applying lotion to the tips of all toes once daily to minimize callus build up to the digits.    May resume her normal bathing routine.    May ambulate to tolerance in casual shoe gear.    RTC in 3 months for her routine exam.    Sae Snowden DPM

## 2019-08-22 ENCOUNTER — LAB VISIT (OUTPATIENT)
Dept: LAB | Facility: HOSPITAL | Age: 79
End: 2019-08-22
Attending: INTERNAL MEDICINE
Payer: MEDICARE

## 2019-08-22 DIAGNOSIS — I10 HYPERTENSION: Primary | ICD-10-CM

## 2019-08-22 LAB
BASOPHILS # BLD AUTO: 0.04 K/UL (ref 0–0.2)
BASOPHILS NFR BLD: 0.6 % (ref 0–1.9)
DIFFERENTIAL METHOD: ABNORMAL
EOSINOPHIL # BLD AUTO: 0.2 K/UL (ref 0–0.5)
EOSINOPHIL NFR BLD: 2.4 % (ref 0–8)
ERYTHROCYTE [DISTWIDTH] IN BLOOD BY AUTOMATED COUNT: 13.4 % (ref 11.5–14.5)
HCT VFR BLD AUTO: 33.1 % (ref 37–48.5)
HGB BLD-MCNC: 10.4 G/DL (ref 12–16)
IMM GRANULOCYTES # BLD AUTO: 0.02 K/UL (ref 0–0.04)
IMM GRANULOCYTES NFR BLD AUTO: 0.3 % (ref 0–0.5)
LYMPHOCYTES # BLD AUTO: 0.8 K/UL (ref 1–4.8)
LYMPHOCYTES NFR BLD: 12.5 % (ref 18–48)
MCH RBC QN AUTO: 29.3 PG (ref 27–31)
MCHC RBC AUTO-ENTMCNC: 31.4 G/DL (ref 32–36)
MCV RBC AUTO: 93 FL (ref 82–98)
MONOCYTES # BLD AUTO: 0.7 K/UL (ref 0.3–1)
MONOCYTES NFR BLD: 10.1 % (ref 4–15)
NEUTROPHILS # BLD AUTO: 4.9 K/UL (ref 1.8–7.7)
NEUTROPHILS NFR BLD: 74.1 % (ref 38–73)
NRBC BLD-RTO: 0 /100 WBC
PLATELET # BLD AUTO: 258 K/UL (ref 150–350)
PMV BLD AUTO: 10.3 FL (ref 9.2–12.9)
RBC # BLD AUTO: 3.55 M/UL (ref 4–5.4)
WBC # BLD AUTO: 6.62 K/UL (ref 3.9–12.7)

## 2019-08-22 PROCEDURE — 36415 COLL VENOUS BLD VENIPUNCTURE: CPT | Mod: PO

## 2019-08-22 PROCEDURE — 85025 COMPLETE CBC W/AUTO DIFF WBC: CPT

## 2019-08-23 ENCOUNTER — TELEPHONE (OUTPATIENT)
Dept: HEMATOLOGY/ONCOLOGY | Facility: CLINIC | Age: 79
End: 2019-08-23

## 2019-08-23 DIAGNOSIS — D64.9 CHRONIC ANEMIA: Primary | ICD-10-CM

## 2019-08-23 NOTE — TELEPHONE ENCOUNTER
Attempted to call pt's [facility] home and let staff know of canceled appt.  No answer, LM.  Eder w on nursing director's vm to rtn call re pt.  Office number provided.

## 2019-08-23 NOTE — TELEPHONE ENCOUNTER
Nellie Monsalve, nursing director at [facility].  Informed of lab results, and MALINDA Ellis's instructions to continue Icar-c and MVI.  Instructed to rtc in 6mths for iron stuidies.  Gricel verbalized understanding.  Appts made and verified.

## 2019-10-14 ENCOUNTER — PROCEDURE VISIT (OUTPATIENT)
Dept: OPHTHALMOLOGY | Facility: CLINIC | Age: 79
End: 2019-10-14
Payer: MEDICARE

## 2019-10-14 VITALS — HEART RATE: 64 BPM | DIASTOLIC BLOOD PRESSURE: 69 MMHG | SYSTOLIC BLOOD PRESSURE: 185 MMHG

## 2019-10-14 DIAGNOSIS — H35.3222 EXUDATIVE AGE-RELATED MACULAR DEGENERATION OF LEFT EYE WITH INACTIVE CHOROIDAL NEOVASCULARIZATION: ICD-10-CM

## 2019-10-14 DIAGNOSIS — H35.3211 EXUDATIVE AGE-RELATED MACULAR DEGENERATION OF RIGHT EYE WITH ACTIVE CHOROIDAL NEOVASCULARIZATION: Primary | ICD-10-CM

## 2019-10-14 PROCEDURE — 67028 PR INJECT INTRAVITREAL PHARMCOLOGIC: ICD-10-PCS | Mod: S$PBB,RT,, | Performed by: OPHTHALMOLOGY

## 2019-10-14 PROCEDURE — 92014 PR EYE EXAM, EST PATIENT,COMPREHESV: ICD-10-PCS | Mod: 25,S$PBB,, | Performed by: OPHTHALMOLOGY

## 2019-10-14 PROCEDURE — 67028 INJECTION EYE DRUG: CPT | Mod: S$PBB,RT,, | Performed by: OPHTHALMOLOGY

## 2019-10-14 PROCEDURE — 92014 COMPRE OPH EXAM EST PT 1/>: CPT | Mod: 25,S$PBB,, | Performed by: OPHTHALMOLOGY

## 2019-10-14 PROCEDURE — 92134 CPTRZ OPH DX IMG PST SGM RTA: CPT | Mod: PBBFAC,PO | Performed by: OPHTHALMOLOGY

## 2019-10-14 PROCEDURE — C9257 BEVACIZUMAB INJECTION: HCPCS | Mod: PBBFAC,JG,PO | Performed by: OPHTHALMOLOGY

## 2019-10-14 PROCEDURE — 67028 INJECTION EYE DRUG: CPT | Mod: PBBFAC,PO,RT | Performed by: OPHTHALMOLOGY

## 2019-10-14 PROCEDURE — 92134 POSTERIOR SEGMENT OCT RETINA (OCULAR COHERENCE TOMOGRAPHY)-BOTH EYES: ICD-10-PCS | Mod: 26,S$PBB,, | Performed by: OPHTHALMOLOGY

## 2019-10-14 RX ORDER — SPIRONOLACTONE 25 MG
1 TABLET ORAL EVERY MORNING
COMMUNITY

## 2019-10-14 RX ADMIN — BEVACIZUMAB 1.25 MG: 100 INJECTION, SOLUTION INTRAVENOUS at 12:10

## 2019-10-14 NOTE — PROGRESS NOTES
HPI     12 wk/OCT  DLS: 07/29/2019 Dr. Sue     Patient states she is having trouble seeing at near.     No gtts         OCT - OD  PED's with SRF - improving  OS - SR Fibrosis - NO SRF      A/P    1. Wet AMD OS  S/p Avastin injections outside  Stable today with cicatrix  Observe    2. Wet AMD OD  S/p Avastin OD x 13  S/p Eylea OD x 11    Avastin OD    Alternate tx      AREDS/AG    3. DM  No significant DR  BS/BP/chol control    4. PCIOL OU      10 weeks OCT      Risks, benefits, and alternatives to treatment discussed in detail with the patient.  The patient voiced understanding and wished to proceed with the procedure    Injection Procedure Note:  Diagnosis: Wet AMD OD    Patient Identified and Time Out complete  Topical Proparacaine and Betadine.  Inject Avastin OD at 6:00 @ 3.5-4mm posterior to limbus  Post Operative Dx: Same  Complications: None  Follow up as above.

## 2019-10-14 NOTE — PATIENT INSTRUCTIONS
POST INTRAVITREAL INJECTION PATIENT INSTRUCTIONS   Below are some guidelines for what to expect after your treatment and additional care instructions.   * you may experience mild discomfort in your eye after the treatment. Your eye usually feels better within 24 to 48 hours after the procedure.   * you have been given drops that numb the surface of your eye.   DO NOT rub or touch your eye, DO NOT wear contacts until numbness goes away.   * you may experience small spots that appear in your field of vision, these are usually caused by an air bubble or from the medication. It taked a few hours or days for these to go away.   * use of sunglasses will help reduce light sensitivity and glare.   * DO NOT swim   for at least 3 hours after the injection   * If you have a gritty, burning, irritating or stinging feeling in the injected eye. This may be a result of the antiseptic used. Use artifical tears or eye lubricant ( from over- the- counter from your local pharmacy ). If you have some at home already please check the expiration date, so not to use anything contaminated in your eye. A cool pack over your eye brow above the injected eye may also relieve discomfort.   Call us right away or go to the Emergency Department if you have a dramatic decrease in vision or extreme pain in the eye.   [facility] [phone]

## 2019-10-22 ENCOUNTER — OFFICE VISIT (OUTPATIENT)
Dept: GASTROENTEROLOGY | Facility: CLINIC | Age: 79
End: 2019-10-22
Payer: MEDICARE

## 2019-10-22 VITALS
HEART RATE: 59 BPM | RESPIRATION RATE: 20 BRPM | BODY MASS INDEX: 32.07 KG/M2 | DIASTOLIC BLOOD PRESSURE: 60 MMHG | SYSTOLIC BLOOD PRESSURE: 180 MMHG | WEIGHT: 181 LBS | HEIGHT: 63 IN

## 2019-10-22 DIAGNOSIS — F79 MENTALLY CHALLENGED: ICD-10-CM

## 2019-10-22 DIAGNOSIS — D63.8 ANEMIA OF CHRONIC DISEASE: ICD-10-CM

## 2019-10-22 DIAGNOSIS — Z86.010 HISTORY OF COLON POLYPS: ICD-10-CM

## 2019-10-22 DIAGNOSIS — Z87.19 HISTORY OF CONSTIPATION: ICD-10-CM

## 2019-10-22 DIAGNOSIS — Z87.19 HISTORY OF SMALL BOWEL OBSTRUCTION: ICD-10-CM

## 2019-10-22 DIAGNOSIS — Z12.11 SCREENING FOR COLON CANCER: Primary | ICD-10-CM

## 2019-10-22 DIAGNOSIS — R03.0 ELEVATED BLOOD PRESSURE READING: ICD-10-CM

## 2019-10-22 PROCEDURE — 99215 OFFICE O/P EST HI 40 MIN: CPT | Mod: PBBFAC,PO | Performed by: NURSE PRACTITIONER

## 2019-10-22 PROCEDURE — 99999 PR PBB SHADOW E&M-EST. PATIENT-LVL V: CPT | Mod: PBBFAC,,, | Performed by: NURSE PRACTITIONER

## 2019-10-22 PROCEDURE — 99214 OFFICE O/P EST MOD 30 MIN: CPT | Mod: S$PBB,,, | Performed by: NURSE PRACTITIONER

## 2019-10-22 PROCEDURE — 99214 PR OFFICE/OUTPT VISIT, EST, LEVL IV, 30-39 MIN: ICD-10-PCS | Mod: S$PBB,,, | Performed by: NURSE PRACTITIONER

## 2019-10-22 PROCEDURE — 99999 PR PBB SHADOW E&M-EST. PATIENT-LVL V: ICD-10-PCS | Mod: PBBFAC,,, | Performed by: NURSE PRACTITIONER

## 2019-10-22 RX ORDER — CARBOXYMETHYLCELLULOSE SODIUM 5 MG/ML
1 SOLUTION/ DROPS OPHTHALMIC 3 TIMES DAILY PRN
COMMUNITY

## 2019-10-22 NOTE — PROGRESS NOTES
Subjective:       Patient ID: Rebeca Turner is a 79 y.o. female, Body mass index is 32.06 kg/m².    Chief Complaint: Follow-up      Patient is new to me. Established patient of Dr. Cr.    Here for screening colonoscopy. Accompanied by michelle, whom assisted with interview. Patient resides in a group home. Feeling well, no complaints. Last c-scope done 10/7/15, two adenomatous polyps removed. Repeat recommended in 4-5 years. Denies hematochezia, melena, abdominal pain, constipation, diarrhea, heartburn, dysphagia, fever, or unexpected weight loss. Bowel movements are once per day. Takes Colace BID and Miralax once daily for history of constipation.     Review of Systems   Constitutional: Negative for appetite change, fever and unexpected weight change.   HENT: Negative for trouble swallowing.    Respiratory: Negative for cough and shortness of breath.    Cardiovascular: Negative for chest pain.   Gastrointestinal: Negative for abdominal pain, blood in stool, constipation, diarrhea, nausea and vomiting.   Genitourinary: Negative for difficulty urinating and dysuria.   Musculoskeletal: Negative for gait problem.   Skin: Negative for rash.   Neurological: Negative for speech difficulty.   Psychiatric/Behavioral: Negative for confusion.       Past Medical History:   Diagnosis Date    Anemia of chronic disease     hematology su neg    CKD (chronic kidney disease) stage 3, GFR 30-59 ml/min     Colon polyp     DM type 2 (diabetes mellitus, type 2)     DVT (deep venous thrombosis)     x 2 both post-op with last 6/16    Dysplastic nevi     h/o    Fatty liver     noted on usg 9/15    GERD (gastroesophageal reflux disease)     H/O esophagogastroduodenoscopy     normal 11/10    History of endometrial cancer     stage 1;  s/p SENDY with BSO 10/06    HTN (hypertension)     Hyperlipidemia     LVE (left ventricular enlargement)     noted on 6/15 ECHO    Macular degeneration     exudative    MR (mental retardation)      Obesity     LUISANA (obstructive sleep apnea)     su in progress    Osteoporosis     osteopenia noted on 2/13 and 7/15 dexa    S/P colonoscopy     last 10/15;  next 10/19    SBO (small bowel obstruction)     h/o recurrent SBO;  s/p R hemicolectomy 12/10 and s/p incarcerated hernia repair with extensive scar tissue 6/16    Seizure disorder     Vitreous detachment       Past Surgical History:   Procedure Laterality Date    ABDOMINAL SURGERY  06/2016    Incarcerated incisional hernia, enterotomy x3, and extensive adhesions      CATARACT EXTRACTION      B 6/11    CHOLECYSTECTOMY      COLON SURGERY  12/6/2010  (Dinorah, Ms.)    R hemicolectomy 12/10 due to recurrent SBO from benign colon mass 12/2010.   Benign albeit large polyp.    COLONOSCOPY N/A 10/7/2015    Dr. Cr; polyps removed; diverticulosis; internal hemorrhoids; repeat in 4-5 years    COLONOSCOPY W/ POLYPECTOMY  11/22/2010  [place], Ms.    3 cm growth mid ascending colon.  TUBULOVILLOUS ADENOMA. 9 mm polyp mid transverse colon.  TUBULAR ADENOMA.  10 mm polyp distal descending colon.  TUBULOVILLOUS ADENOMA.     ESOPHAGOGASTRODUODENOSCOPY  11/16/2010    Normal EGD.    HERNIA REPAIR  8/2015    from incarcerated hernia    HYSTERECTOMY  10/17/2006    SENDY and BSO 10/06 for stage 1 endometrial cancer    MYRINGOTOMY W/ TUBES      bilateral    TONSILLECTOMY        Family History   Problem Relation Age of Onset    Cancer Sister         details unknown    Heart attack Father     Pulmonary embolism Mother       Wt Readings from Last 10 Encounters:   10/22/19 82.1 kg (181 lb)   08/21/19 82.1 kg (181 lb)   08/14/19 82.3 kg (181 lb 7 oz)   08/06/19 83.7 kg (184 lb 9.6 oz)   05/02/19 82.7 kg (182 lb 5.1 oz)   04/12/19 82.7 kg (182 lb 6.9 oz)   02/22/19 80.8 kg (178 lb 2.1 oz)   01/15/19 80.7 kg (177 lb 14.6 oz)   11/13/18 80.7 kg (178 lb)   10/22/18 79.8 kg (176 lb)     Lab Results   Component Value Date    WBC 6.62 08/22/2019    HGB 10.4 (L) 08/22/2019     HCT 33.1 (L) 08/22/2019    MCV 93 08/22/2019     08/22/2019     CMP  Sodium   Date Value Ref Range Status   02/19/2019 144 136 - 145 mmol/L Final     Potassium   Date Value Ref Range Status   02/19/2019 4.1 3.5 - 5.1 mmol/L Final     Chloride   Date Value Ref Range Status   02/19/2019 103 95 - 110 mmol/L Final     CO2   Date Value Ref Range Status   02/19/2019 33 (H) 23 - 29 mmol/L Final     Glucose   Date Value Ref Range Status   02/19/2019 179 (H) 70 - 110 mg/dL Final     BUN, Bld   Date Value Ref Range Status   02/19/2019 25 (H) 8 - 23 mg/dL Final     Creatinine   Date Value Ref Range Status   02/19/2019 1.0 0.5 - 1.4 mg/dL Final     Calcium   Date Value Ref Range Status   02/19/2019 10.1 8.7 - 10.5 mg/dL Final     Total Protein   Date Value Ref Range Status   02/19/2019 6.3 6.0 - 8.4 g/dL Final     Albumin   Date Value Ref Range Status   02/19/2019 3.4 (L) 3.5 - 5.2 g/dL Final     Total Bilirubin   Date Value Ref Range Status   02/19/2019 0.3 0.1 - 1.0 mg/dL Final     Comment:     For infants and newborns, interpretation of results should be based  on gestational age, weight and in agreement with clinical  observations.  Premature Infant recommended reference ranges:  Up to 24 hours.............<8.0 mg/dL  Up to 48 hours............<12.0 mg/dL  3-5 days..................<15.0 mg/dL  6-29 days.................<15.0 mg/dL       Alkaline Phosphatase   Date Value Ref Range Status   02/19/2019 153 (H) 55 - 135 U/L Final     AST   Date Value Ref Range Status   02/19/2019 16 10 - 40 U/L Final     ALT   Date Value Ref Range Status   02/19/2019 25 10 - 44 U/L Final     Anion Gap   Date Value Ref Range Status   02/19/2019 8 8 - 16 mmol/L Final     eGFR if    Date Value Ref Range Status   02/19/2019 >60 >60 mL/min/1.73 m^2 Final     eGFR if non    Date Value Ref Range Status   02/19/2019 54 (A) >60 mL/min/1.73 m^2 Final     Comment:     Calculation used to obtain the estimated  glomerular filtration  rate (eGFR) is the CKD-EPI equation.          Lab Results   Component Value Date    IRON 72 02/19/2019    TIBC 339 02/19/2019    FERRITIN 50 02/19/2019          Reviewed prior medical records including endoscopy history (see surgical history).     Objective:      Physical Exam   Constitutional: She is oriented to person, place, and time. She appears well-developed and well-nourished.   HENT:   Head: Normocephalic.   Eyes: Pupils are equal, round, and reactive to light.   Neck: Normal range of motion.   Cardiovascular: Normal rate, regular rhythm and normal heart sounds.   No murmur heard.  Pulmonary/Chest: Breath sounds normal. No respiratory distress. She has no wheezes.   Abdominal: Soft. Bowel sounds are normal. She exhibits no distension and no mass. There is no tenderness. There is no guarding.   Obese abdomen   Musculoskeletal: Normal range of motion.   Neurological: She is alert and oriented to person, place, and time.   Skin: Skin is warm and dry. No rash noted.   Non jaundiced   Psychiatric: She has a normal mood and affect. Her speech is normal.         Assessment:       1. Screening for colon cancer    2. History of colon polyps    3. Anemia of chronic disease    4. Mentally challenged    5. History of small bowel obstruction    6. History of constipation    7. Elevated blood pressure reading           Plan:   All diagnoses and orders for this visit:    Screening for colon cancer & History of colon polyps   - Schedule Colonoscopy, discussed procedure with the patient, including risks and benefits, patient verbalized understanding    Anemia of chronic disease   - Follow-up with hematology for continued evaluation and management    Mentally challenged    History of small bowel obstruction    History of constipation   - Recommend daily exercise as tolerated, adequate water intake (six 8-oz glasses of water daily), and high fiber diet. OTC fiber supplements are recommended if diet does  not reach daily fiber goal (20-30 grams daily), such as Metamucil, Citrucel, or FiberCon (take as directed, separate from other oral medications by >2 hours).   - Continue taking an OTC stool softener such as Colace as directed to avoid hard stools and straining with bowel movements PRN   - Continue taking OTC MiraLax once daily (17g PO) as directed    Elevated blood pressure reading   - Follow-up with PCP for continued evaluation and management    If no improvement in symptoms or symptoms worsen, call/follow-up at clinic or go to ER

## 2019-10-22 NOTE — PATIENT INSTRUCTIONS
Colorectal Cancer Screening    Colorectal cancer (cancer in the colon or rectum) is a leading cause of cancer deaths in the [place] But it doesnt have to be. When this cancer is found and removed early, the chances of a full recovery are very good. Because colorectal cancer rarely causes symptoms in its early stages, screening for the disease is important. Its even more crucial if you have risk factors for the disease. Learn more about colorectal cancer and its risk factors. Then talk to your healthcare provider about being screened. You could be saving your own life.  Risk factors for colorectal cancer  Your risk of having colorectal cancer increases if you:  · Are 50 years of age or older  · Have a family history or personal history of colorectal cancer or polyps  · Have a personal history of type 2 diabetes, Crohns disease, or ulcerative colitis  · Have an inherited genetic syndrome like Hernandez syndrome (also known as HNPCC) or familial adenomatous polyposis (FAP)  · Are very overweight  · Are not physically active  · Smoke  · Drink a lot of alcohol  · Eat a lot of red or processed meat  The colon and rectum  Waste from food you eat enters the colon from the small intestine. As it travels through the colon, the waste (stool) loses water and becomes more solid. Intestinal muscles push it toward the sigmoid--the last section of the colon. Stool then moves into the rectum, where its stored until its ready to leave the body during a bowel movement.  How cancer develops  Polyps are growths that form on the inner lining of the colon or rectum. Most are benign, which means they arent cancerous. But over time, some polyps can become cancer (malignant). This happens when cells in these polyps begin growing abnormally. In time, malignant cells invade more and more of the colon and rectum. The cancer may also spread to nearby organs or lymph nodes or to other parts of the body. Finding and removing polyps can help  prevent cancer from ever forming.  Your screening  Screening means looking for a health problem before you have symptoms. During screening for colorectal cancer, your healthcare provider will ask about your health history, examine you, and do one or more tests.  History and exam  The history and exam involve the following:  · Health history. Your healthcare provider will ask about your health history. Mention if a family member has had colon cancer or polyps. Also mention any health problems you have had in the past.  · Digital rectal exam (JUWAN). During a JUWAN, the healthcare provider inserts a lubricated gloved finger into the rectum. The test is painless and takes less than a minute. Healthcare providers agree that this test alone is not enough to screen for colorectal cancer.  Screening test choices  Fecal occult blood test (FOBT) or fecal immunochemical test (FIT)  These tests check for occult blood in stool (blood you cant see). Hidden blood may be a sign of colon polyps or cancer. A small sample of stool is tested for blood in a laboratory. Most often, you collect this sample at home using a kit your healthcare provider gives you. Follow the instructions carefully for using this kit. You might need to avoid certain foods and medicines before the test, as directed.  Barium enema with contrast (double-contrast barium enema)  This test uses X-rays to provide images of the entire colon and rectum. The day before this test, you will need to do a bowel prep to clean out the colon and rectum. A bowel prep is a liquid diet plus strong laxatives or enemas. You will be awake for the test, but you may be given medicine to help you relax. At the start of the test, a radiologist (a healthcare provider who specializes in imaging tests) places a soft tube into the rectum. The tube is used to fill the colon with a contrast liquid (barium) and air. This can be uncomfortable for some people. The liquid helps the colon show up  clearly on the X-rays. Because the test uses X-rays, it exposes you to a small amount of radiation.  Virtual colonoscopy  This exam is also called a CT colonography. It uses a series of X-ray photographs to create a 3-D view of the colon and rectum. The day before the test, you will need to do a bowel prep to clean out your colon. Your healthcare provider will give you instructions on how to do this. During the procedure, you will lie on a table that is part of a special X-ray machine called a CT scanner. A small tube will be placed into your rectum to fill the colon and rectum with air. This can be uncomfortable for some people. Then, the table will move into the machine and pictures will be taken of your colon and rectum. A computer will combine these photos to create a 3-D picture. Because the test uses X-rays, it exposes you to a small amount of radiation.  Scope exams  Here are two types of scope exams:  · Colonoscopy. This test can be used to find and remove polyps anywhere in the colon or rectum. The day before the test, you will do a bowel prep. This is a liquid diet plus a strong laxative solution or an enema. The bowel prep will cleanse your colon. You will be given instructions for this. Just before the test, you are given a medicine to make you sleepy. Then, a long, flexible, lighted tube called a colonoscope is gently inserted into the rectum and guided through the entire colon. Images of the colon are viewed on a video screen. Any polyps that are found are removed and sent to a lab for testing. If a polyp cant be removed, a sample of tissue is taken and the polyp might be removed later during surgery. You will need to bring someone with you to drive you home after this test.   · Sigmoidoscopy. This test is similar to colonoscopy, but focuses only on the sigmoid colon and rectum. As with colonoscopy, bowel prep must be done the day before this test. It might not need to be as complete as the bowel prep  for a colonoscopy. You are awake during the procedure, but you may be given medicine to help you relax. During the test, the healthcare provider guides a thin, flexible, lighted tube called a sigmoidoscope through your rectum and lower colon. The images are displayed on a video screen. Polyps are removed, if possible, and sent to a lab for testing.  Colonoscopy is the only screening test that lets your healthcare provider see the entire colon and rectum. This test also lets your healthcare provider remove any pieces of tissue that need to be looked at by a lab. If something suspicious is found using any other tests, you will likely need a colonoscopy.     When to call your healthcare provider after a test  Call your healthcare provider if you have any of the following after any screening test:  · Bleeding  · Fever of 100.4°F (38°C) or higher, or as directed by your healthcare provider  · Abdominal pain  · Vomiting   Date Last Reviewed: 11/4/2015  © 3369-7762 [email or web address]. [address] [place] [address], [place] [address]. All rights reserved. This information is not intended as a substitute for professional medical care. Always follow your healthcare professional's instructions.

## 2019-10-22 NOTE — LETTER
October 22, 2019      Provider Glenn Aguilar - Gastroenterology  [address]  [place] [address]  Phone: [phone]          Patient: Rebeca Turner   MR Number: [id number]   YOB: 1940   Date of Visit: 10/22/2019       Dear Provider Glenn:    Thank you for referring Rebeca Turner to me for evaluation. Attached you will find relevant portions of my assessment and plan of care.    If you have questions, please do not hesitate to call me. I look forward to following Rebeca Turner along with you.    Sincerely,    Kailey Lima, NP    Enclosure  CC:  No Recipients    If you would like to receive this communication electronically, please contact [email or web address] or [phone] to request more information on "[employer] Link access.    For providers and/or their staff who would like to refer a patient to [facility], please contact us through our one-stop-shop provider referral line, [facility] , at [phone].    If you feel you have received this communication in error or would no longer like to receive these types of communications, please e-mail [email or web address]

## 2019-11-06 ENCOUNTER — TELEPHONE (OUTPATIENT)
Dept: NEUROLOGY | Facility: CLINIC | Age: 79
End: 2019-11-06

## 2019-11-06 NOTE — TELEPHONE ENCOUNTER
----- Message from Amada Bui sent at 11/6/2019  2:21 PM CST -----  Contact: Gricel- Medical Assistant   Gricel- Medical Assistant called, she need to schedule a follow up visit. Please call back at  [phone].

## 2019-11-06 NOTE — TELEPHONE ENCOUNTER
Spoke with Gricel and informed booked out until January. Pt added to wait list. Will call when January schedule opens. Verbalized understanding.

## 2019-11-07 ENCOUNTER — OFFICE VISIT (OUTPATIENT)
Dept: OTOLARYNGOLOGY | Facility: CLINIC | Age: 79
End: 2019-11-07
Payer: MEDICARE

## 2019-11-07 VITALS — HEIGHT: 63 IN | WEIGHT: 180.75 LBS | BODY MASS INDEX: 32.03 KG/M2

## 2019-11-07 DIAGNOSIS — H90.3 BILATERAL SENSORINEURAL HEARING LOSS: Primary | ICD-10-CM

## 2019-11-07 DIAGNOSIS — H61.23 BILATERAL IMPACTED CERUMEN: ICD-10-CM

## 2019-11-07 DIAGNOSIS — Z97.4 WEARS HEARING AID IN BOTH EARS: ICD-10-CM

## 2019-11-07 DIAGNOSIS — H93.293 IMPAIRED AUDITORY DISCRIMINATION, BILATERAL: ICD-10-CM

## 2019-11-07 PROCEDURE — 69210 REMOVE IMPACTED EAR WAX UNI: CPT | Mod: 50,PBBFAC,PO | Performed by: NURSE PRACTITIONER

## 2019-11-07 PROCEDURE — 69210 REMOVE IMPACTED EAR WAX UNI: CPT | Mod: S$PBB,,, | Performed by: NURSE PRACTITIONER

## 2019-11-07 PROCEDURE — 99499 NO LOS: ICD-10-PCS | Mod: S$PBB,,, | Performed by: NURSE PRACTITIONER

## 2019-11-07 PROCEDURE — 99213 OFFICE O/P EST LOW 20 MIN: CPT | Mod: PBBFAC,PO,25 | Performed by: NURSE PRACTITIONER

## 2019-11-07 PROCEDURE — 99999 PR PBB SHADOW E&M-EST. PATIENT-LVL III: CPT | Mod: PBBFAC,,, | Performed by: NURSE PRACTITIONER

## 2019-11-07 PROCEDURE — 69210 PR REMOVAL IMPACTED CERUMEN REQUIRING INSTRUMENTATION, UNILATERAL: ICD-10-PCS | Mod: S$PBB,,, | Performed by: NURSE PRACTITIONER

## 2019-11-07 PROCEDURE — 99999 PR PBB SHADOW E&M-EST. PATIENT-LVL III: ICD-10-PCS | Mod: PBBFAC,,, | Performed by: NURSE PRACTITIONER

## 2019-11-07 PROCEDURE — 99499 UNLISTED E&M SERVICE: CPT | Mod: S$PBB,,, | Performed by: NURSE PRACTITIONER

## 2019-11-07 RX ORDER — INSULIN ADMIN. SUPPLIES
INSULIN PEN (EA) SUBCUTANEOUS
COMMUNITY
Start: 2019-08-29 | End: 2021-09-19 | Stop reason: CLARIF

## 2019-11-07 NOTE — PROGRESS NOTES
Subjective:       Patient ID: Rebeca Turner is a 79 y.o. female.    Chief Complaint: No chief complaint on file.    HPI   Patient resides at [facility] and returns today for her annual audiogram. She wears bilateral hearing aids but she is not wearing them because they are currently broken. She has h/o moderately-severe to profound predominantly sensorineural hearing loss. She has worn full-shell behind-the-ear (BTE) hearing aids for many years. She denies current ENT symptoms or complaints.     Review of Systems   Constitutional: Negative.    HENT: Positive for hearing loss.    Eyes: Negative.    Respiratory: Negative.    Cardiovascular: Negative.    Gastrointestinal: Negative for abdominal pain, nausea and vomiting.   Musculoskeletal: Negative.    Skin: Negative.    Neurological: Negative.    Psychiatric/Behavioral: Negative.        Objective:      Physical Exam   Constitutional: She is oriented to person, place, and time. Vital signs are normal. She appears well-developed and well-nourished. She is cooperative. She does not appear ill. No distress.   HENT:   Head: Normocephalic and atraumatic.   Right Ear: Hearing, tympanic membrane, external ear and ear canal normal. Tympanic membrane is not erythematous. No middle ear effusion.   Left Ear: Hearing, tympanic membrane, external ear and ear canal normal. Tympanic membrane is not erythematous.  No middle ear effusion.   Nose: Nose normal. No mucosal edema or rhinorrhea.   Mouth/Throat: Uvula is midline, oropharynx is clear and moist and mucous membranes are normal.     SEPARATE PROCEDURE IN OFFICE:   Procedure: Removal of impacted cerumen, bilateral   Pre Procedure Diagnosis: Cerumen Impaction   Post Procedure Diagnosis: Cerumen Impaction   Verbal informed consent in regards to risk of trauma to ear canal, ear drum or hearing, discomfort during procedure and/or inability to remove cerumen impaction in one session or unforeseen events or  complications.   No anesthesia.     Procedure in detail:   Ear canal visualized bilateral with appropriate size ear speculum utilizing Operating Head Binocular Otomicroscope   Utilizing the following: Ring curet, alligator forceps AU. The impacted cerumen of the ear canals was removed atraumatically. The TM and EAC were then inspected and found to be clear of wax. See description of TMs/EACs in PE above.   Complications: No   Condition: Improved/Good     Eyes: Pupils are equal, round, and reactive to light. Conjunctivae, EOM and lids are normal. Right eye exhibits no discharge. Left eye exhibits no discharge. No scleral icterus.   Neck: Trachea normal and normal range of motion. Neck supple. No tracheal deviation present.   Cardiovascular: Normal rate.   Pulmonary/Chest: Effort normal. No stridor. No respiratory distress. She has no wheezes.   Musculoskeletal: Normal range of motion.   Ambulates with rolling walker   Neurological: She is alert and oriented to person, place, and time. She has normal strength. Coordination and gait normal.   Skin: Skin is warm, dry and intact. No lesion and no rash noted. She is not diaphoretic. No cyanosis. No pallor.   Psychiatric: She has a normal mood and affect. Her speech is normal and behavior is normal. Judgment and thought content normal. Cognition and memory are normal.   Nursing note and vitals reviewed.    Assessment:     Cerumen impactions removed AU    H/o moderately-severe to profound sensorineural hearing loss with impaired auditory discrimination AU  Wears hearing aids bilaterally, but they are currently broken  Plan:     Recommend continued returning to where hearing aids were purchased and have them repaired or serviced ASAP    Recommend annual audiogram for hearing aid adjustments  Return to clinic in one year and as needed for ENT concerns

## 2019-11-08 ENCOUNTER — LAB VISIT (OUTPATIENT)
Dept: LAB | Facility: HOSPITAL | Age: 79
End: 2019-11-08
Attending: INTERNAL MEDICINE
Payer: MEDICARE

## 2019-11-08 DIAGNOSIS — E78.5 HYPERLIPEMIA: ICD-10-CM

## 2019-11-08 DIAGNOSIS — I10 ESSENTIAL HYPERTENSION, MALIGNANT: Primary | ICD-10-CM

## 2019-11-08 DIAGNOSIS — E11.9 DIABETES MELLITUS WITHOUT COMPLICATION: ICD-10-CM

## 2019-11-08 LAB
ALBUMIN SERPL BCP-MCNC: 3.4 G/DL (ref 3.5–5.2)
ALP SERPL-CCNC: 166 U/L (ref 55–135)
ALT SERPL W/O P-5'-P-CCNC: 16 U/L (ref 10–44)
ANION GAP SERPL CALC-SCNC: 8 MMOL/L (ref 8–16)
AST SERPL-CCNC: 14 U/L (ref 10–40)
BASOPHILS # BLD AUTO: 0.03 K/UL (ref 0–0.2)
BASOPHILS NFR BLD: 0.5 % (ref 0–1.9)
BILIRUB SERPL-MCNC: 0.3 MG/DL (ref 0.1–1)
BUN SERPL-MCNC: 23 MG/DL (ref 8–23)
CALCIUM SERPL-MCNC: 9.6 MG/DL (ref 8.7–10.5)
CHLORIDE SERPL-SCNC: 104 MMOL/L (ref 95–110)
CHOLEST SERPL-MCNC: 121 MG/DL (ref 120–199)
CHOLEST/HDLC SERPL: 2.9 {RATIO} (ref 2–5)
CO2 SERPL-SCNC: 30 MMOL/L (ref 23–29)
CREAT SERPL-MCNC: 1 MG/DL (ref 0.5–1.4)
DIFFERENTIAL METHOD: ABNORMAL
EOSINOPHIL # BLD AUTO: 0.2 K/UL (ref 0–0.5)
EOSINOPHIL NFR BLD: 3.3 % (ref 0–8)
ERYTHROCYTE [DISTWIDTH] IN BLOOD BY AUTOMATED COUNT: 13.4 % (ref 11.5–14.5)
EST. GFR  (AFRICAN AMERICAN): >60 ML/MIN/1.73 M^2
EST. GFR  (NON AFRICAN AMERICAN): 53.7 ML/MIN/1.73 M^2
ESTIMATED AVG GLUCOSE: 148 MG/DL (ref 68–131)
GLUCOSE SERPL-MCNC: 107 MG/DL (ref 70–110)
HBA1C MFR BLD HPLC: 6.8 % (ref 4–5.6)
HCT VFR BLD AUTO: 35.6 % (ref 37–48.5)
HDLC SERPL-MCNC: 42 MG/DL (ref 40–75)
HDLC SERPL: 34.7 % (ref 20–50)
HGB BLD-MCNC: 11 G/DL (ref 12–16)
IMM GRANULOCYTES # BLD AUTO: 0.01 K/UL (ref 0–0.04)
IMM GRANULOCYTES NFR BLD AUTO: 0.2 % (ref 0–0.5)
LDLC SERPL CALC-MCNC: 53.2 MG/DL (ref 63–159)
LYMPHOCYTES # BLD AUTO: 0.8 K/UL (ref 1–4.8)
LYMPHOCYTES NFR BLD: 14.6 % (ref 18–48)
MCH RBC QN AUTO: 29.9 PG (ref 27–31)
MCHC RBC AUTO-ENTMCNC: 30.9 G/DL (ref 32–36)
MCV RBC AUTO: 97 FL (ref 82–98)
MONOCYTES # BLD AUTO: 0.5 K/UL (ref 0.3–1)
MONOCYTES NFR BLD: 8.5 % (ref 4–15)
NEUTROPHILS # BLD AUTO: 4.2 K/UL (ref 1.8–7.7)
NEUTROPHILS NFR BLD: 72.9 % (ref 38–73)
NONHDLC SERPL-MCNC: 79 MG/DL
NRBC BLD-RTO: 0 /100 WBC
PLATELET # BLD AUTO: 250 K/UL (ref 150–350)
PMV BLD AUTO: 10.2 FL (ref 9.2–12.9)
POTASSIUM SERPL-SCNC: 4.1 MMOL/L (ref 3.5–5.1)
PROT SERPL-MCNC: 6.8 G/DL (ref 6–8.4)
RBC # BLD AUTO: 3.68 M/UL (ref 4–5.4)
SODIUM SERPL-SCNC: 142 MMOL/L (ref 136–145)
TRIGL SERPL-MCNC: 129 MG/DL (ref 30–150)
TSH SERPL DL<=0.005 MIU/L-ACNC: 1.46 UIU/ML (ref 0.4–4)
WBC # BLD AUTO: 5.75 K/UL (ref 3.9–12.7)

## 2019-11-08 PROCEDURE — 85025 COMPLETE CBC W/AUTO DIFF WBC: CPT

## 2019-11-08 PROCEDURE — 83036 HEMOGLOBIN GLYCOSYLATED A1C: CPT

## 2019-11-08 PROCEDURE — 84443 ASSAY THYROID STIM HORMONE: CPT

## 2019-11-08 PROCEDURE — 80061 LIPID PANEL: CPT

## 2019-11-08 PROCEDURE — 36415 COLL VENOUS BLD VENIPUNCTURE: CPT | Mod: PO

## 2019-11-08 PROCEDURE — 80053 COMPREHEN METABOLIC PANEL: CPT

## 2019-11-19 ENCOUNTER — TELEPHONE (OUTPATIENT)
Dept: NEUROLOGY | Facility: CLINIC | Age: 79
End: 2019-11-19

## 2019-11-19 NOTE — TELEPHONE ENCOUNTER
Called pt to schedule f/u appt from wait list;no answer;left vmail for pt to call; ph number provided.

## 2019-12-05 ENCOUNTER — OFFICE VISIT (OUTPATIENT)
Dept: PODIATRY | Facility: CLINIC | Age: 79
End: 2019-12-05
Payer: MEDICARE

## 2019-12-05 VITALS — HEIGHT: 63 IN | BODY MASS INDEX: 32.03 KG/M2 | WEIGHT: 180.75 LBS

## 2019-12-05 DIAGNOSIS — E11.42 DIABETIC POLYNEUROPATHY ASSOCIATED WITH TYPE 2 DIABETES MELLITUS: Primary | ICD-10-CM

## 2019-12-05 DIAGNOSIS — B35.1 ONYCHOMYCOSIS DUE TO DERMATOPHYTE: ICD-10-CM

## 2019-12-05 DIAGNOSIS — M20.42 HAMMER TOES OF BOTH FEET: ICD-10-CM

## 2019-12-05 DIAGNOSIS — M20.41 HAMMER TOES OF BOTH FEET: ICD-10-CM

## 2019-12-05 DIAGNOSIS — L84 CORN OR CALLUS: ICD-10-CM

## 2019-12-05 PROCEDURE — 11057 PARNG/CUTG B9 HYPRKR LES >4: CPT | Mod: Q9,S$PBB,, | Performed by: PODIATRIST

## 2019-12-05 PROCEDURE — 11721 DEBRIDE NAIL 6 OR MORE: CPT | Mod: 59,Q9,S$PBB, | Performed by: PODIATRIST

## 2019-12-05 PROCEDURE — 99999 PR PBB SHADOW E&M-EST. PATIENT-LVL II: CPT | Mod: PBBFAC,,, | Performed by: PODIATRIST

## 2019-12-05 PROCEDURE — 11057 PARNG/CUTG B9 HYPRKR LES >4: CPT | Mod: PBBFAC,PN | Performed by: PODIATRIST

## 2019-12-05 PROCEDURE — 11721 PR DEBRIDEMENT OF NAILS, 6 OR MORE: ICD-10-PCS | Mod: 59,Q9,S$PBB, | Performed by: PODIATRIST

## 2019-12-05 PROCEDURE — 99999 PR PBB SHADOW E&M-EST. PATIENT-LVL II: ICD-10-PCS | Mod: PBBFAC,,, | Performed by: PODIATRIST

## 2019-12-05 PROCEDURE — 99499 UNLISTED E&M SERVICE: CPT | Mod: S$PBB,,, | Performed by: PODIATRIST

## 2019-12-05 PROCEDURE — 11721 DEBRIDE NAIL 6 OR MORE: CPT | Mod: PBBFAC,PN | Performed by: PODIATRIST

## 2019-12-05 PROCEDURE — 11057 PR TRIM BENIGN HYPERKERATOTIC SKIN LESION,>4: ICD-10-PCS | Mod: Q9,S$PBB,, | Performed by: PODIATRIST

## 2019-12-05 PROCEDURE — 99212 OFFICE O/P EST SF 10 MIN: CPT | Mod: PBBFAC,PN,25 | Performed by: PODIATRIST

## 2019-12-05 PROCEDURE — 99499 NO LOS: ICD-10-PCS | Mod: S$PBB,,, | Performed by: PODIATRIST

## 2019-12-05 NOTE — PROGRESS NOTES
Patient ID: Rebeca Turner is a 79 y.o. female.    Chief Complaint: Diabetes Mellitus (6.8 11/8/19 Johan  11/22/19) and Diabetic Foot Exam    Rebeca is a 79 y.o. female who presents to the clinic for evaluation and treatment of diabetic feet. Rebeca has a past medical history of Anemia of chronic disease, CKD (chronic kidney disease) stage 3, GFR 30-59 ml/min, Colon polyp, DM type 2 (diabetes mellitus, type 2), DVT (deep venous thrombosis), Dysplastic nevi, Fatty liver, GERD (gastroesophageal reflux disease), H/O esophagogastroduodenoscopy, History of endometrial cancer, HTN (hypertension), Hyperlipidemia, LVE (left ventricular enlargement), Macular degeneration, MR (mental retardation), Obesity, LUISANA (obstructive sleep apnea), Osteoporosis, S/P colonoscopy, SBO (small bowel obstruction), Seizure disorder, and Vitreous detachment. Patient relates no major problem with feet. Only complaints today consist of toenails and calluses in need of trimming.  Denies being painful with wearing shoe gear.  Continues to apply moisturizer to areas of callus build up daily.   Continues to maintain tight blood glucose.  Denies any additional pedal complaints.    PCP: JUSTICE Prado MD    Date Last Seen by PCP: 11/19    Hemoglobin A1C   Date Value Ref Range Status   11/08/2019 6.8 (H) 4.0 - 5.6 % Final     Comment:     ADA Screening Guidelines:  5.7-6.4%  Consistent with prediabetes  >or=6.5%  Consistent with diabetes  High levels of fetal hemoglobin interfere with the HbA1C  assay. Heterozygous hemoglobin variants (HbS, HgC, etc)do  not significantly interfere with this assay.   However, presence of multiple variants may affect accuracy.     06/05/2019 7.0 (H) 4.0 - 5.6 % Final     Comment:     ADA Screening Guidelines:  5.7-6.4%  Consistent with prediabetes  >or=6.5%  Consistent with diabetes  High levels of fetal hemoglobin interfere with the HbA1C  assay. Heterozygous hemoglobin variants (HbS, HgC, etc)do  not significantly  interfere with this assay.   However, presence of multiple variants may affect accuracy.     12/14/2018 6.2 (H) 4.0 - 5.6 % Final     Comment:     ADA Screening Guidelines:  5.7-6.4%  Consistent with prediabetes  >or=6.5%  Consistent with diabetes  High levels of fetal hemoglobin interfere with the HbA1C  assay. Heterozygous hemoglobin variants (HbS, HgC, etc)do  not significantly interfere with this assay.   However, presence of multiple variants may affect accuracy.             Past Medical History:   Diagnosis Date    Anemia of chronic disease     hematology su neg    CKD (chronic kidney disease) stage 3, GFR 30-59 ml/min     Colon polyp     DM type 2 (diabetes mellitus, type 2)     DVT (deep venous thrombosis)     x 2 both post-op with last 6/16    Dysplastic nevi     h/o    Fatty liver     noted on usg 9/15    GERD (gastroesophageal reflux disease)     H/O esophagogastroduodenoscopy     normal 11/10    History of endometrial cancer     stage 1;  s/p SENDY with BSO 10/06    HTN (hypertension)     Hyperlipidemia     LVE (left ventricular enlargement)     noted on 6/15 ECHO    Macular degeneration     exudative    MR (mental retardation)     Obesity     LUISANA (obstructive sleep apnea)     su in progress    Osteoporosis     osteopenia noted on 2/13 and 7/15 dexa    S/P colonoscopy     last 10/15;  next 10/19    SBO (small bowel obstruction)     h/o recurrent SBO;  s/p R hemicolectomy 12/10 and s/p incarcerated hernia repair with extensive scar tissue 6/16    Seizure disorder     Vitreous detachment        Past Surgical History:   Procedure Laterality Date    ABDOMINAL SURGERY  06/2016    Incarcerated incisional hernia, enterotomy x3, and extensive adhesions      CATARACT EXTRACTION      B 6/11    CHOLECYSTECTOMY      COLON SURGERY  12/6/2010  (Ms Dinorah.)    R hemicolectomy 12/10 due to recurrent SBO from benign colon mass 12/2010.   Benign albeit large polyp.    COLONOSCOPY N/A 10/7/2015     Dr. Cr; polyps removed; diverticulosis; internal hemorrhoids; repeat in 4-5 years    COLONOSCOPY W/ POLYPECTOMY  11/22/2010  Dinorah, Ms.    3 cm growth mid ascending colon.  TUBULOVILLOUS ADENOMA. 9 mm polyp mid transverse colon.  TUBULAR ADENOMA.  10 mm polyp distal descending colon.  TUBULOVILLOUS ADENOMA.     ESOPHAGOGASTRODUODENOSCOPY  11/16/2010    Normal EGD.    HERNIA REPAIR  8/2015    from incarcerated hernia    HYSTERECTOMY  10/17/2006    SENDY and BSO 10/06 for stage 1 endometrial cancer    MYRINGOTOMY W/ TUBES      bilateral    TONSILLECTOMY         Family History   Problem Relation Age of Onset    Cancer Sister         details unknown    Heart attack Father     Pulmonary embolism Mother        Social History     Socioeconomic History    Marital status: Single     Spouse name: Not on file    Number of children: Not on file    Years of education: Not on file    Highest education level: Not on file   Occupational History    Not on file   Social Needs    Financial resource strain: Not on file    Food insecurity:     Worry: Not on file     Inability: Not on file    Transportation needs:     Medical: Not on file     Non-medical: Not on file   Tobacco Use    Smoking status: Never Smoker    Smokeless tobacco: Never Used   Substance and Sexual Activity    Alcohol use: No     Alcohol/week: 0.0 standard drinks    Drug use: No    Sexual activity: Never   Lifestyle    Physical activity:     Days per week: Not on file     Minutes per session: Not on file    Stress: Not on file   Relationships    Social connections:     Talks on phone: Not on file     Gets together: Not on file     Attends Hinduism service: Not on file     Active member of club or organization: Not on file     Attends meetings of clubs or organizations: Not on file     Relationship status: Not on file   Other Topics Concern    Not on file   Social History Narrative    Lives at [facility].       Current Outpatient Prescriptions    Medication Sig Dispense Refill    alendronate (FOSAMAX) 70 MG tablet TAKE ONE TABLET WEEKLY AS DIRECTED ON THURSDAY 4 tablet 0    calcium carbonate-vitamin D3 (CALCIUM 600 + D,3,) 600 mg calcium- 200 unit Cap Take 1 capsule by mouth 2 (two) times daily.      CRESTOR 10 mg tablet TAKE ONE TABLET BY MOUTH EVERY NIGHT AT BEDTIME 30 tablet 0    diltiaZEM (CARDIZEM CD) 360 MG 24 hr capsule       dimethicone-kelvin-A-C-E-aloe (GOLD BOND ULTIMATE DIABETICS') 3-30 % Crea Apply topically 2 (two) times daily. APPLY TO FEET      docusate sodium (COLACE) 100 MG capsule Take 100 mg by mouth 2 (two) times daily.      fish oil-omega-3 fatty acids 300-1,000 mg capsule Take 2 g by mouth once daily.      hydrALAZINE (APRESOLINE) 50 MG tablet Take 50 mg by mouth every 12 (twelve) hours.      hydroCHLOROthiazide (HYDRODIURIL) 25 MG tablet       iron-vitamin C 100-250 mg, ICAR-C, 100-250 mg Tab Take one tablet in the morning with an acidic drink before breakfast. 90 tablet 0    labetalol (NORMODYNE) 300 MG tablet TAKE ONE TABLET BY MOUTH TWICE DAILY FOR BLOOD PRESSURE 60 tablet 0    LANTUS SOLOSTAR 100 unit/mL (3 mL) InPn pen Inject 35 Units into the skin every evening.       lisinopril (PRINIVIL,ZESTRIL) 20 MG tablet       lutein 20 mg Cap TAKE ONE SOFTGEL BY MOUTH EVERY MORNING 30 each 0    multivitamin capsule Take 1 capsule by mouth once daily.      polyethylene glycol (GLYCOLAX) 17 gram/dose powder       TEGRETOL  mg 12 hr tablet TAKE ONE TABLET BY MOUTH ONCE DAILY SEIZURES (Patient taking differently: one tablet bid) 30 tablet 0     No current facility-administered medications for this visit.        Review of patient's allergies indicates:  No Known Allergies      Review of Systems   Constitution: Negative for chills and fever.   Cardiovascular: Negative for claudication and leg swelling.   Skin: Positive for color change, nail changes and suspicious lesions.   Musculoskeletal: Negative for arthritis, joint  pain and joint swelling.   Gastrointestinal: Negative for nausea and vomiting.   Neurological: Positive for numbness. Negative for paresthesias.   Psychiatric/Behavioral: Negative for altered mental status.           Objective:      Physical Exam   Constitutional: She is oriented to person, place, and time. She appears well-developed and well-nourished. No distress.   Cardiovascular:   Pulses:       Dorsalis pedis pulses are 2+ on the right side, and 2+ on the left side.        Posterior tibial pulses are 1+ on the right side, and 1+ on the left side.   CFT <3 seconds bilateral.  Pedal hair growth decreased bilateral.  Varicosities noted to bilateral lower extremity.  Mild nonpitting edema noted to bilateral lower extremity.  Toes are cool to touch bilateral.     Musculoskeletal: She exhibits edema. She exhibits no tenderness.   Muscle strength 5/5 in all muscle groups bilateral.  No tenderness nor crepitation with ROM of foot/ankle joints bilateral.  No tenderness with palpation of bilateral foot and ankle.  Bilateral pes planus foot type.  Bilateral semi-rigid contracture of toes 2-5.   Neurological: She is alert and oriented to person, place, and time. She has normal strength. A sensory deficit is present.   Protective sensation per [place] monofilament decreased bilateral.    Vibratory sensation decreased bilateral.    Light touch intact bilateral.   Skin: Skin is warm, dry and intact. Capillary refill takes less than 2 seconds. Lesion noted. No abrasion, no bruising, no burn, no ecchymosis, no laceration, no petechiae and no rash noted. She is not diaphoretic. No cyanosis or erythema. No pallor. Nails show no clubbing.   Pedal skin appears thin and atrophic bilateral.  Toenails x 10 appear thickened by 2 mm's, elongated by 4 mm's, and discolored with subungual debris.  Hyperkeratotic lesion noted to bilateral 3rd and 4th toes at the distal tip as well as to the Lt. 3rd and 4th sub met heads.  Multiple  nevi noted to the dorsum of the Lt. Foot. No break in the adjacent skin integrity.              Assessment:       Encounter Diagnoses   Name Primary?    Diabetic polyneuropathy associated with type 2 diabetes mellitus Yes    Hammer toes of both feet     Onychomycosis due to dermatophyte     Corn or callus          Plan:       Rebeca was seen today for diabetes mellitus and diabetic foot exam.    Diagnoses and all orders for this visit:    Diabetic polyneuropathy associated with type 2 diabetes mellitus    Hammer toes of both feet    Onychomycosis due to dermatophyte    Corn or callus      I counseled the patient on her conditions, their implications and medical management.    Advised to continue wearing shoe gear that accommodates for digital deformities.    To continue wearing the crest pad to offload the tips of the lesser digits on the Rt. Foot.    Shoe inspection. Diabetic Foot Education. Patient reminded of the importance of good nutrition and blood sugar control to help prevent podiatric complications of diabetes. Patient instructed on proper foot hygeine. We discussed wearing proper shoe gear, daily foot inspections, never walking without protective shoe gear, never putting sharp instruments to feet    With patient's permission, nails were aggressively reduced and debrided x 10 to their soft tissue attachment mechanically and with electric , removing all offending nail and debris.  Also, a sterile #15 scalpel was used to trim lesions x 6 down to smooth appearing skin without incident.  Patient relates relief following the procedure. She will continue to monitor the areas daily, inspect her feet, wear protective shoe gear when ambulatory, moisturizer to maintain skin integrity and follow in this office in approximately 2-3 months, sooner p.r.n.    Follow up in about 3 months (around 3/5/2020).    Sae Snowden DPM

## 2019-12-19 ENCOUNTER — HOSPITAL ENCOUNTER (OUTPATIENT)
Dept: RADIOLOGY | Facility: HOSPITAL | Age: 79
Discharge: HOME | End: 2019-12-19
Attending: INTERNAL MEDICINE
Payer: MEDICARE

## 2019-12-19 DIAGNOSIS — Z12.31 BREAST CANCER SCREENING BY MAMMOGRAM: ICD-10-CM

## 2019-12-19 PROCEDURE — 77067 SCR MAMMO BI INCL CAD: CPT | Mod: 26,,, | Performed by: RADIOLOGY

## 2019-12-19 PROCEDURE — 77063 MAMMO DIGITAL SCREENING BILAT WITH TOMOSYNTHESIS_CAD: ICD-10-PCS | Mod: 26,,, | Performed by: RADIOLOGY

## 2019-12-19 PROCEDURE — 77067 MAMMO DIGITAL SCREENING BILAT WITH TOMOSYNTHESIS_CAD: ICD-10-PCS | Mod: 26,,, | Performed by: RADIOLOGY

## 2019-12-19 PROCEDURE — 77063 BREAST TOMOSYNTHESIS BI: CPT | Mod: 26,,, | Performed by: RADIOLOGY

## 2019-12-19 PROCEDURE — 77067 SCR MAMMO BI INCL CAD: CPT | Mod: TC,PO

## 2019-12-30 ENCOUNTER — PROCEDURE VISIT (OUTPATIENT)
Dept: OPHTHALMOLOGY | Facility: CLINIC | Age: 79
End: 2019-12-30
Payer: MEDICARE

## 2019-12-30 VITALS — SYSTOLIC BLOOD PRESSURE: 142 MMHG | HEART RATE: 58 BPM | DIASTOLIC BLOOD PRESSURE: 45 MMHG

## 2019-12-30 DIAGNOSIS — H43.393 VITREOUS FLOATERS OF BOTH EYES: ICD-10-CM

## 2019-12-30 DIAGNOSIS — H35.3222 EXUDATIVE AGE-RELATED MACULAR DEGENERATION OF LEFT EYE WITH INACTIVE CHOROIDAL NEOVASCULARIZATION: ICD-10-CM

## 2019-12-30 DIAGNOSIS — H35.3211 EXUDATIVE AGE-RELATED MACULAR DEGENERATION OF RIGHT EYE WITH ACTIVE CHOROIDAL NEOVASCULARIZATION: Primary | ICD-10-CM

## 2019-12-30 PROCEDURE — 92014 PR EYE EXAM, EST PATIENT,COMPREHESV: ICD-10-PCS | Mod: 25,S$PBB,, | Performed by: OPHTHALMOLOGY

## 2019-12-30 PROCEDURE — 92134 CPTRZ OPH DX IMG PST SGM RTA: CPT | Mod: PBBFAC,PO | Performed by: OPHTHALMOLOGY

## 2019-12-30 PROCEDURE — 67028 INJECTION EYE DRUG: CPT | Mod: PBBFAC,PO,RT | Performed by: OPHTHALMOLOGY

## 2019-12-30 PROCEDURE — 92134 POSTERIOR SEGMENT OCT RETINA (OCULAR COHERENCE TOMOGRAPHY)-BOTH EYES: ICD-10-PCS | Mod: 26,S$PBB,, | Performed by: OPHTHALMOLOGY

## 2019-12-30 PROCEDURE — 67028 INJECTION EYE DRUG: CPT | Mod: S$PBB,RT,, | Performed by: OPHTHALMOLOGY

## 2019-12-30 PROCEDURE — 67028 PR INJECT INTRAVITREAL PHARMCOLOGIC: ICD-10-PCS | Mod: S$PBB,RT,, | Performed by: OPHTHALMOLOGY

## 2019-12-30 PROCEDURE — 92014 COMPRE OPH EXAM EST PT 1/>: CPT | Mod: 25,S$PBB,, | Performed by: OPHTHALMOLOGY

## 2019-12-30 RX ADMIN — AFLIBERCEPT 2 MG: 40 INJECTION, SOLUTION INTRAVITREAL at 12:12

## 2019-12-30 NOTE — PATIENT INSTRUCTIONS

## 2019-12-30 NOTE — PROGRESS NOTES
HPI     DLS: 10/14/2019 Dr. Sue   10 WEEK FU- OCT, AVASTIN TODAY      No gtts       OCT - OD  PED's with SRF - improving  OS - SR Fibrosis - NO SRF      A/P    1. Wet AMD OS  S/p Avastin injections outside  Stable today with cicatrix  Observe    2. Wet AMD OD  S/p Avastin OD x 14  S/p Eylea OD x 11    Eylea OD    Alternate tx      AREDS/AG    3. DM  No significant DR  BS/BP/chol control    4. PCIOL OU      9-10 weeks OCT      Risks, benefits, and alternatives to treatment discussed in detail with the patient.  The patient voiced understanding and wished to proceed with the procedure    Injection Procedure Note:  Diagnosis: Wet AMD OD    Patient Identified and Time Out complete  Topical Proparacaine and Betadine.  Inject Eylea OD at 6:00 @ 3.5-4mm posterior to limbus  Post Operative Dx: Same  Complications: None  Follow up as above.

## 2020-01-07 NOTE — OR NURSING
Spoke with pt's nurse Gypsy, at [facility].  She states that pt signs her own consents.  Dr. Cr was able to look in the computer and verified that she had signed her last consent.  Her emergency contact is Manish Puckett [phone].  Dr. Cr wants her to cut her insulin to 25 units on Wednesday 1/8/2020. He stated it is ok for her to take the Magnesium Citrate for the procedure.  Attempted to relay this info to Gypsy, message was left. NM  All info was relayed to Gypsy at [facility].  Understanding verbalized. NM

## 2020-01-09 ENCOUNTER — ANESTHESIA EVENT (OUTPATIENT)
Dept: ENDOSCOPY | Facility: HOSPITAL | Age: 80
End: 2020-01-09
Payer: MEDICARE

## 2020-01-09 ENCOUNTER — HOSPITAL ENCOUNTER (OUTPATIENT)
Facility: HOSPITAL | Age: 80
Discharge: HOME | End: 2020-01-09
Attending: INTERNAL MEDICINE | Admitting: INTERNAL MEDICINE
Payer: MEDICARE

## 2020-01-09 ENCOUNTER — ANESTHESIA (OUTPATIENT)
Dept: ENDOSCOPY | Facility: HOSPITAL | Age: 80
End: 2020-01-09
Payer: MEDICARE

## 2020-01-09 DIAGNOSIS — Z86.010 HX OF COLONIC POLYPS: ICD-10-CM

## 2020-01-09 LAB — GLUCOSE SERPL-MCNC: 116 MG/DL (ref 70–110)

## 2020-01-09 PROCEDURE — 45380 COLONOSCOPY AND BIOPSY: CPT | Mod: 59,,, | Performed by: INTERNAL MEDICINE

## 2020-01-09 PROCEDURE — 37000009 HC ANESTHESIA EA ADD 15 MINS: Mod: PO | Performed by: INTERNAL MEDICINE

## 2020-01-09 PROCEDURE — D9220A PRA ANESTHESIA: Mod: PT,CRNA,, | Performed by: NURSE ANESTHETIST, CERTIFIED REGISTERED

## 2020-01-09 PROCEDURE — 37000008 HC ANESTHESIA 1ST 15 MINUTES: Mod: PO | Performed by: INTERNAL MEDICINE

## 2020-01-09 PROCEDURE — 63600175 PHARM REV CODE 636 W HCPCS: Mod: PO | Performed by: NURSE ANESTHETIST, CERTIFIED REGISTERED

## 2020-01-09 PROCEDURE — 27201012 HC FORCEPS, HOT/COLD, DISP: Mod: PO | Performed by: INTERNAL MEDICINE

## 2020-01-09 PROCEDURE — D9220A PRA ANESTHESIA: ICD-10-PCS | Mod: PT,CRNA,, | Performed by: NURSE ANESTHETIST, CERTIFIED REGISTERED

## 2020-01-09 PROCEDURE — 45380 COLONOSCOPY AND BIOPSY: CPT | Mod: PO | Performed by: INTERNAL MEDICINE

## 2020-01-09 PROCEDURE — D9220A PRA ANESTHESIA: Mod: PT,ANES,, | Performed by: ANESTHESIOLOGY

## 2020-01-09 PROCEDURE — 45380 PR COLONOSCOPY,BIOPSY: ICD-10-PCS | Mod: 59,,, | Performed by: INTERNAL MEDICINE

## 2020-01-09 PROCEDURE — D9220A PRA ANESTHESIA: ICD-10-PCS | Mod: PT,ANES,, | Performed by: ANESTHESIOLOGY

## 2020-01-09 PROCEDURE — 45385 PR COLONOSCOPY,REMV LESN,SNARE: ICD-10-PCS | Mod: PT,,, | Performed by: INTERNAL MEDICINE

## 2020-01-09 PROCEDURE — 88305 TISSUE EXAM BY PATHOLOGIST: CPT | Performed by: PATHOLOGY

## 2020-01-09 PROCEDURE — 88305 TISSUE EXAM BY PATHOLOGIST: CPT | Mod: 26,,, | Performed by: PATHOLOGY

## 2020-01-09 PROCEDURE — 88305 TISSUE EXAM BY PATHOLOGIST: ICD-10-PCS | Mod: 26,,, | Performed by: PATHOLOGY

## 2020-01-09 PROCEDURE — 27201089 HC SNARE, DISP (ANY): Mod: PO | Performed by: INTERNAL MEDICINE

## 2020-01-09 PROCEDURE — 45385 COLONOSCOPY W/LESION REMOVAL: CPT | Mod: PO | Performed by: INTERNAL MEDICINE

## 2020-01-09 PROCEDURE — 82962 GLUCOSE BLOOD TEST: CPT | Mod: PO | Performed by: INTERNAL MEDICINE

## 2020-01-09 PROCEDURE — 63600175 PHARM REV CODE 636 W HCPCS: Mod: PO | Performed by: INTERNAL MEDICINE

## 2020-01-09 PROCEDURE — 45385 COLONOSCOPY W/LESION REMOVAL: CPT | Mod: PT,,, | Performed by: INTERNAL MEDICINE

## 2020-01-09 RX ORDER — LIDOCAINE HCL/PF 100 MG/5ML
SYRINGE (ML) INTRAVENOUS
Status: DISCONTINUED | OUTPATIENT
Start: 2020-01-09 | End: 2020-01-09

## 2020-01-09 RX ORDER — PROPOFOL 10 MG/ML
VIAL (ML) INTRAVENOUS
Status: DISCONTINUED | OUTPATIENT
Start: 2020-01-09 | End: 2020-01-09

## 2020-01-09 RX ORDER — SODIUM CHLORIDE 0.9 % (FLUSH) 0.9 %
10 SYRINGE (ML) INJECTION
Status: DISCONTINUED | OUTPATIENT
Start: 2020-01-09 | End: 2020-01-09 | Stop reason: HOSPADM

## 2020-01-09 RX ORDER — SODIUM CHLORIDE, SODIUM LACTATE, POTASSIUM CHLORIDE, CALCIUM CHLORIDE 600; 310; 30; 20 MG/100ML; MG/100ML; MG/100ML; MG/100ML
INJECTION, SOLUTION INTRAVENOUS CONTINUOUS
Status: DISCONTINUED | OUTPATIENT
Start: 2020-01-09 | End: 2020-01-09 | Stop reason: HOSPADM

## 2020-01-09 RX ADMIN — PROPOFOL 30 MG: 10 INJECTION, EMULSION INTRAVENOUS at 08:01

## 2020-01-09 RX ADMIN — PROPOFOL 40 MG: 10 INJECTION, EMULSION INTRAVENOUS at 08:01

## 2020-01-09 RX ADMIN — PROPOFOL 30 MG: 10 INJECTION, EMULSION INTRAVENOUS at 09:01

## 2020-01-09 RX ADMIN — SODIUM CHLORIDE, SODIUM LACTATE, POTASSIUM CHLORIDE, AND CALCIUM CHLORIDE: .6; .31; .03; .02 INJECTION, SOLUTION INTRAVENOUS at 08:01

## 2020-01-09 RX ADMIN — LIDOCAINE HYDROCHLORIDE 75 MG: 20 INJECTION, SOLUTION INTRAVENOUS at 08:01

## 2020-01-09 RX ADMIN — PROPOFOL 80 MG: 10 INJECTION, EMULSION INTRAVENOUS at 08:01

## 2020-01-09 NOTE — TRANSFER OF CARE
"Anesthesia Transfer of Care Note    Patient: Rebeca Turner    Procedure(s) Performed: Procedure(s) (LRB):  COLONOSCOPY (N/A)    Patient location: PACU    Anesthesia Type: general    Transport from OR: Transported from OR on room air with adequate spontaneous ventilation    Post pain: adequate analgesia    Post assessment: no apparent anesthetic complications    Post vital signs: stable    Level of consciousness: awake and sedated    Nausea/Vomiting: no nausea/vomiting    Complications: none    Transfer of care protocol was followed      Last vitals:   Visit Vitals  BP (!) 195/72 (BP Location: Right arm, Patient Position: Lying)   Pulse 68   Temp 36.6 °C (97.8 °F) (Skin)   Resp 20   Ht 5' 3" (1.6 m)   Wt 80.3 kg (177 lb)   SpO2 96%   Breastfeeding? No   BMI 31.35 kg/m²     "

## 2020-01-09 NOTE — H&P
History & Physical - Short Stay  Gastroenterology      SUBJECTIVE:     Procedure: Colonoscopy    Chief Complaint/Indication for Procedure: Surveillance    History of Present Illness:  Asymptomatic    Office Visit     10/22/2019  [place] - Gastroenterology      Kailey Lima NP   Gastroenterology   Screening for colon cancer +6 more   Dx   Follow-up ; Referred by Provider [facility]   Reason for Visit        Progress Notes        Subjective:       Patient ID: Rebeca Turner is a 79 y.o. female, Body mass index is 32.06 kg/m².     Chief Complaint: Follow-up        Patient is new to me. Established patient of Dr. Cr.     Here for screening colonoscopy. Accompanied by sitter, whom assisted with interview. Patient resides in a group home. Feeling well, no complaints. Last c-scope done 10/7/15, two adenomatous polyps removed. Repeat recommended in 4-5 years. Denies hematochezia, melena, abdominal pain, constipation, diarrhea, heartburn, dysphagia, fever, or unexpected weight loss. Bowel movements are once per day. Takes Colace BID and Miralax once daily for history of constipation.      Assessment:       1. Screening for colon cancer    2. History of colon polyps    3. Anemia of chronic disease    4. Mentally challenged    5. History of small bowel obstruction    6. History of constipation    7. Elevated blood pressure reading           Plan:   All diagnoses and orders for this visit:     Screening for colon cancer & History of colon polyps              - Schedule Colonoscopy, discussed procedure with the patient, including risks and benefits, patient verbalized understanding     Anemia of chronic disease              - Follow-up with hematology for continued evaluation and management     Mentally challenged     History of small bowel obstruction     History of constipation              - Recommend daily exercise as tolerated, adequate water intake (six 8-oz glasses of water daily), and high fiber diet. OTC  fiber supplements are recommended if diet does not reach daily fiber goal (20-30 grams daily), such as Metamucil, Citrucel, or FiberCon (take as directed, separate from other oral medications by >2 hours).              - Continue taking an OTC stool softener such as Colace as directed to avoid hard stools and straining with bowel movements PRN              - Continue taking OTC MiraLax once daily (17g PO) as directed     Elevated blood pressure reading              - Follow-up with PCP for continued evaluation and management            Last Colonoscopy 10/7/2015:  Impression:  - Two 1 to 2 mm polyps in the distal transverse colon.                       Resected and retrieved.                       - Two 1 to 2 mm polyps in the cecum. Resected and retrieved.                       - Diverticulosis in the sigmoid colon.                       - Patent functional end-to-end ileo-colonic                        anastomosis, characterized by healthy appearing mucosa.                       - Internal hemorrhoids.                       - The examined portion of the ileum was normal.  Recommendation:      - Discharge patient to home.                       - Await pathology results.                       - If the pathology report reveals adenomatous                        tissue, then repeat the colonoscopy for surveillance in 4-5 years.                       - If the pathology report indicates hyperplastic                        polyp, then repeat colonoscopy for surveillance in 6-7 years.                       - Resume previous diet today.                       - Call the G.I. clinic in 2 weeks for reports (if                        you haven't heard from us sooner) [phone].                       - Continue present medications.                       - Return to normal activities tomorrow.  Will Cr MD  10/7/2015     SPECIMEN  1) Transverse colon polyp.  2) Neocecum polyp.  FINAL PATHOLOGIC DIAGNOSIS  BIOPSIES OF  "1. TRANSVERSE COLON AND 2. NEOCECUM:  ADENOMATOUS POLYPS.        PTA Medications   Medication Sig    acetaminophen (TYLENOL) 500 MG tablet Take 500 mg by mouth every 6 (six) hours as needed for Pain.    alendronate (FOSAMAX) 70 MG tablet TAKE ONE TABLET WEEKLY AS DIRECTED ON THURSDAY    calcium carbonate-vitamin D3 (CALCIUM 600 + D,3,) 600 mg calcium- 200 unit Cap Take 1 capsule by mouth 2 (two) times daily.    carBAMazepine (TEGRETOL XR) 100 MG 12 hr tablet Take 100 mg by mouth 2 (two) times daily.    carboxymethylcellulose (REFRESH PLUS) 0.5 % Dpet 1 drop 3 (three) times daily as needed.    diltiaZEM (CARDIZEM CD) 360 MG 24 hr capsule Take 360 mg by mouth once daily.     dimethicone-kelvin-A-C-E-aloe (GOLD BOND ULTIMATE DIABETICS') 3-30 % Crea Apply topically 2 (two) times daily. APPLY TO FEET    docusate sodium (COLACE) 100 MG capsule Take 100 mg by mouth 2 (two) times daily.    fish oil-omega-3 fatty acids 300-1,000 mg capsule Take 2 g by mouth once daily.    hydrALAZINE (APRESOLINE) 50 MG tablet Take 50 mg by mouth every 12 (twelve) hours.    hydroCHLOROthiazide (HYDRODIURIL) 25 MG tablet Take 25 mg by mouth once daily.     labetalol (NORMODYNE) 300 MG tablet TAKE ONE TABLET BY MOUTH TWICE DAILY FOR BLOOD PRESSURE    LEVEMIR FLEXTOUCH U-100 INSULN 100 unit/mL (3 mL) InPn pen 35 Units every morning.     lisinopril (PRINIVIL,ZESTRIL) 20 MG tablet Take 20 mg by mouth 2 (two) times daily.     lutein 20 mg Cap lutein 20 mg capsule   Take by oral route.    multivitamin capsule Take 1 capsule by mouth once daily.    polyethylene glycol (GLYCOLAX) 17 gram/dose powder Take by mouth. 1/2 capful of purple top- in 8 ounces of liquid    rosuvastatin (CRESTOR) 10 MG tablet Take 10 mg by mouth once daily.    FLUZONE HIGH-DOSE 2019-20, PF, 180 mcg/0.5 mL Syrg ADM 0.5ML IM UTD    NOVOFINE AUTOCOVER 30 gauge x 1/3" Ndle        Review of patient's allergies indicates:  No Known Allergies     Past Medical " History:   Diagnosis Date    Anemia of chronic disease     hematology su neg    CKD (chronic kidney disease) stage 3, GFR 30-59 ml/min     Colon polyp     DM type 2 (diabetes mellitus, type 2)     DVT (deep venous thrombosis)     x 2 both post-op with last 6/16    Dysplastic nevi     h/o    Fatty liver     noted on usg 9/15    GERD (gastroesophageal reflux disease)     H/O esophagogastroduodenoscopy     normal 11/10    History of endometrial cancer     stage 1;  s/p SENDY with BSO 10/06    HTN (hypertension)     Hyperlipidemia     LVE (left ventricular enlargement)     noted on 6/15 ECHO    Macular degeneration     exudative    MR (mental retardation)     Obesity     LUISANA (obstructive sleep apnea)     su in progress, uses bipap    Osteoporosis     osteopenia noted on 2/13 and 7/15 dexa    S/P colonoscopy     last 10/15;  next 10/19    SBO (small bowel obstruction)     h/o recurrent SBO;  s/p R hemicolectomy 12/10 and s/p incarcerated hernia repair with extensive scar tissue 6/16    Seizure disorder     Vitreous detachment      Past Surgical History:   Procedure Laterality Date    ABDOMINAL SURGERY  06/2016    Incarcerated incisional hernia, enterotomy x3, and extensive adhesions      CATARACT EXTRACTION      B 6/11    CHOLECYSTECTOMY      COLON SURGERY  12/6/2010  (Ms Dinorah.)    R hemicolectomy 12/10 due to recurrent SBO from benign colon mass 12/2010.   Benign albeit large polyp.    COLONOSCOPY N/A 10/7/2015    Dr. Cr; polyps removed; diverticulosis; internal hemorrhoids; repeat in 4-5 years    COLONOSCOPY W/ POLYPECTOMY  11/22/2010  Dinorah Ms.    3 cm growth mid ascending colon.  TUBULOVILLOUS ADENOMA. 9 mm polyp mid transverse colon.  TUBULAR ADENOMA.  10 mm polyp distal descending colon.  TUBULOVILLOUS ADENOMA.     ESOPHAGOGASTRODUODENOSCOPY  11/16/2010    Normal EGD.    HERNIA REPAIR  8/2015    from incarcerated hernia    HYSTERECTOMY  10/17/2006    SENDY and BSO 10/06 for stage  "1 endometrial cancer    MYRINGOTOMY W/ TUBES      bilateral    TONSILLECTOMY       Family History   Problem Relation Age of Onset    Cancer Sister         details unknown    Heart attack Father     Pulmonary embolism Mother      Social History     Tobacco Use    Smoking status: Never Smoker    Smokeless tobacco: Never Used   Substance Use Topics    Alcohol use: No     Alcohol/week: 0.0 standard drinks    Drug use: No         OBJECTIVE:     Vital Signs (Most Recent)  Temp: 97.8 °F (36.6 °C) (01/09/20 0828)  Pulse: 68 (01/09/20 0828)  Resp: 20 (01/09/20 0828)  BP: (!) 195/72 (01/09/20 0828)  SpO2: 96 % (01/09/20 0828)    Physical Exam:  :Ht 5' 3" (1.6 m)   Wt 82.1 kg (181 lb)   BMI 32.06 kg/m²                                                         GENERAL:  Comfortable, in no acute distress.                                 HEENT EXAM:  Nonicteric.  No adenopathy.  Oropharynx is clear.               NECK:  Supple.                                                               LUNGS:  Clear.                                                               CARDIAC:  Regular rate and rhythm.  S1, S2.  No murmur.                      ABDOMEN:  Soft, positive bowel sounds, nontender.  No hepatosplenomegaly or masses.  No rebound or guarding.                                             EXTREMITIES:  No edema.     MENTAL STATUS:  Alert and oriented.    ASSESSMENT/PLAN:     Assessment: Surveillance, Hx of colon polyps.    Plan: Colonoscopy    Anesthesia Plan:   MAC / General Anaesthesia    ASA Grade: ASA 2 - Patient with mild systemic disease with no functional limitations    MALLAMPATI SCORE: II (hard and soft palate, upper portion of tonsils anduvula visible)    "

## 2020-01-09 NOTE — DISCHARGE INSTRUCTIONS
Recovery After Procedural Sedation (Adult)  You have been given medicine by vein to make you sleep during your surgery. This may have included both a pain medicine and sleeping medicine. Most of the effects have worn off. But you may still have some drowsiness for the next 6 to 8 hours.  Home care  Follow these guidelines when you get home:  · For the next 8 hours, you should be watched by a responsible adult. This person should make sure your condition is not getting worse.  · Don't drink any alcohol for the next 24 hours.  · Don't drive, operate dangerous machinery, or make important business or personal decisions during the next 24 hours.  Note: Your healthcare provider may tell you not to take any medicine by mouth for pain or sleep in the next 4 hours. These medicines may react with the medicines you were given in the hospital. This could cause a much stronger response than usual.  Follow-up care  Follow up with your healthcare provider if you are not alert and back to your usual level of activity within 12 hours.  When to seek medical advice  Call your healthcare provider right away if any of these occur:  · Drowsiness gets worse  · Weakness or dizziness gets worse  · Repeated vomiting  · You can't be awakened   Date Last Reviewed: 10/18/2016  © 0715-9161 [employer]. [address] [place] [address], [place] [address]. All rights reserved. This information is not intended as a substitute for professional medical care. Always follow your healthcare professional's instructions.        High-Fiber Diet  Fiber is in fruits, vegetables, cereals, and grains. Fiber passes through your body undigested. A high-fiber diet helps food move through your intestinal tract. The added bulk is helpful in preventing constipation. In people with diverticulosis, fiber helps clean out the pouches along the colon wall. It also prevents new pouches from forming. A high-fiber diet reduces the risk of colon cancer. It also lowers  blood cholesterol and prevents high blood sugar in people with diabetes.    The fiber-rich foods listed below should be part of your diet. If you are not used to high-fiber foods, start with 1 or 2 foods from this list. Every 3 to 4 days add a new one to your diet. Do this until you are eating 4 high-fiber foods per day. This should give you 20 to 35 grams of fiber a day. It is also important to drink a lot of water when you are on this diet. You should have 6 to 8 glasses of water a day. Water makes the fiber swell and increases the benefit.  Foods high in dietary fiber  The following foods are high in dietary fiber:  · Breads. Breads made with 100% whole-wheat flour; jd, wheat, or rye crackers; whole-grain tortillas, bran muffins.  · Cereals. Whole-grain and bran cereals with bran (shredded wheat, wheat flakes, raisin bran, corn bran); oatmeal, rolled oats, granola, and brown rice.  · Fruits. Fresh fruits and their edible skins (pears, prunes, raisins, berries, apples, and apricots); bananas, citrus fruit, mangoes, pineapple; and prune juice.  · Nuts. Any nuts and seeds.  · Vegetables. Best served raw or lightly cooked. All types, especially: green peas, celery, eggplant, potatoes, spinach, broccoli, Simms sprouts, winter squash, carrots, cauliflower, soybeans, lentils, and fresh and dried beans of all kinds.  · Other. Popcorn, any spices.  Date Last Reviewed: 8/1/2016  © 9069-6740 [employer]. [address] [place] [address], [place] [address]. All rights reserved. This information is not intended as a substitute for professional medical care. Always follow your healthcare professional's instructions.

## 2020-01-09 NOTE — ANESTHESIA PREPROCEDURE EVALUATION
01/09/2020  Rebeca Turner is a 79 y.o., female.    Anesthesia Evaluation    I have reviewed the Patient Summary Reports.    I have reviewed the Nursing Notes.   I have reviewed the Medications.     Review of Systems  Anesthesia Hx:  No problems with previous Anesthesia    Social:  Non-Smoker Smoking Status: Never Smoker  Smokeless Tobacco Status: Never Used  Alcohol use: No  Drug use: No       Cardiovascular:   Hypertension Dysrhythmias    Pulmonary:   Sleep Apnea    Renal/:   Chronic Renal Disease, CRI    Hepatic/GI:   GERD, poorly controlled Liver Disease, Fatty liver   Neurological:   Neuromuscular Disease, Seizures, well controlled  Denies Cognitive Impairment  Neuromuscular Disease Autistic  Endocrine:   Diabetes, poorly controlled    Psych:   Psychiatric History          Physical Exam  General:  Well nourished    Airway/Jaw/Neck:  Airway Findings: Mouth Opening: Normal Tongue: Normal  General Airway Assessment: Adult, Good  Mallampati: II  Improves to II with phonation.  TM Distance: 4-6 cm      Dental:  Dental Findings: In tact   Chest/Lungs:  Chest/Lungs Findings: Clear to auscultation, Normal Respiratory Rate     Heart/Vascular:  Heart Findings: Rate: Normal  Rhythm: Regular Rhythm  Sounds: Normal  Heart murmur: negative       Mental Status:  Mental Status Findings:  Cooperative, Alert and Oriented         Anesthesia Plan  Type of Anesthesia, risks & benefits discussed:  Anesthesia Type:  general  Patient's Preference:   Intra-op Monitoring Plan: standard ASA monitors  Intra-op Monitoring Plan Comments:   Post Op Pain Control Plan:   Post Op Pain Control Plan Comments:   Induction:   IV  Beta Blocker:  Patient is on a Beta-Blocker and has received one dose within the past 24 hours (No further documentation required).       Informed Consent: Patient understands risks and agrees with Anesthesia  plan.  Questions answered. Anesthesia consent signed with patient.  ASA Score: 4     Day of Surgery Review of History & Physical: I have interviewed and examined the patient. I have reviewed the patient's H&P dated:  There are no significant changes.  H&P update referred to the surgeon.  H&P completed by Anesthesiologist.       Ready For Surgery From Anesthesia Perspective.

## 2020-01-09 NOTE — ANESTHESIA POSTPROCEDURE EVALUATION
Anesthesia Post Evaluation    Patient: Rebeca Turner    Procedure(s) Performed: Procedure(s) (LRB):  COLONOSCOPY (N/A)    Final Anesthesia Type: general    Patient location during evaluation: PACU  Patient participation: Yes- Able to Participate  Level of consciousness: awake and alert and oriented  Post-procedure vital signs: reviewed and stable  Pain management: adequate  Airway patency: patent    PONV status at discharge: No PONV  Anesthetic complications: no      Cardiovascular status: blood pressure returned to baseline  Respiratory status: unassisted, spontaneous ventilation and room air  Hydration status: euvolemic  Follow-up not needed.          Vitals Value Taken Time   /61 1/9/2020  9:17 AM   Temp 36.6 °C (97.8 °F) 1/9/2020  8:28 AM   Pulse 50 1/9/2020  9:17 AM   Resp 17 1/9/2020  9:17 AM   SpO2 98 % 1/9/2020  9:17 AM         No case tracking events are documented in the log.      Pain/Michael Score: Michael Score: 4 (1/9/2020  9:17 AM)

## 2020-01-09 NOTE — BRIEF OP NOTE
Discharge Note  Short Stay      SUMMARY     Admit Date: 1/9/2020    Attending Physician: Will Cr Jr., MD     Discharge Physician: Will Cr Jr., MD    Discharge Date: 1/9/2020 9:41 AM    Final Diagnosis: * No pre-op diagnosis entered *  Impression:          - Two 2 to 4 mm polyps in the proximal transverse                        colon, removed with a cold snare. Resected and                        retrieved.                       - One 1 to 2 mm polyp in the cecum, removed with a                        cold biopsy forceps. Resected and retrieved.                       - Diverticulosis in the sigmoid colon and in the                        descending colon.                       - Non-bleeding internal hemorrhoids.                       - Patent functional end-to-end ileo-colonic                        anastomosis, characterized by healthy appearing                        mucosa.                       - The examination was otherwise normal.                       - The examined portion of the ileum was normal.  Recommendation:      - Discharge patient to home.                       - Await pathology results.                       - High fiber diet.                       - Use fiber, for example Citrucel, Fibercon, Konsyl                        or Metamucil.                       - Continue present medications.                       - Colace capsule(s) orally 100 mg daily PRN.                       - Repeat colonoscopy is not recommended due to                        current age (79 years or older).                       - Patient has a contact number available for                        emergencies. The signs and symptoms of potential                        delayed complications were discussed with the                        patient. Return to normal activities tomorrow.                        Written discharge instructions were provided to the                        patient.                       -  Return to normal activities tomorrow.  Will Cr MD  1/9/2020   Disposition: HOME OR SELF CARE    Patient Instructions:   Current Discharge Medication List      CONTINUE these medications which have NOT CHANGED    Details   acetaminophen (TYLENOL) 500 MG tablet Take 500 mg by mouth every 6 (six) hours as needed for Pain.      alendronate (FOSAMAX) 70 MG tablet TAKE ONE TABLET WEEKLY AS DIRECTED ON THURSDAY  Qty: 4 tablet, Refills: 0      calcium carbonate-vitamin D3 (CALCIUM 600 + D,3,) 600 mg calcium- 200 unit Cap Take 1 capsule by mouth 2 (two) times daily.      carBAMazepine (TEGRETOL XR) 100 MG 12 hr tablet Take 100 mg by mouth 2 (two) times daily.      carboxymethylcellulose (REFRESH PLUS) 0.5 % Dpet 1 drop 3 (three) times daily as needed.      diltiaZEM (CARDIZEM CD) 360 MG 24 hr capsule Take 360 mg by mouth once daily.       dimethicone-kelvin-A-C-E-aloe (GOLD BOND ULTIMATE DIABETICS') 3-30 % Crea Apply topically 2 (two) times daily. APPLY TO FEET      docusate sodium (COLACE) 100 MG capsule Take 100 mg by mouth 2 (two) times daily.      fish oil-omega-3 fatty acids 300-1,000 mg capsule Take 2 g by mouth once daily.      hydrALAZINE (APRESOLINE) 50 MG tablet Take 50 mg by mouth every 12 (twelve) hours.      hydroCHLOROthiazide (HYDRODIURIL) 25 MG tablet Take 25 mg by mouth once daily.       labetalol (NORMODYNE) 300 MG tablet TAKE ONE TABLET BY MOUTH TWICE DAILY FOR BLOOD PRESSURE  Qty: 60 tablet, Refills: 0      LEVEMIR FLEXTOUCH U-100 INSULN 100 unit/mL (3 mL) InPn pen 35 Units every morning.       lisinopril (PRINIVIL,ZESTRIL) 20 MG tablet Take 20 mg by mouth 2 (two) times daily.       lutein 20 mg Cap lutein 20 mg capsule   Take by oral route.      multivitamin capsule Take 1 capsule by mouth once daily.      polyethylene glycol (GLYCOLAX) 17 gram/dose powder Take by mouth. 1/2 capful of purple top- in 8 ounces of liquid      rosuvastatin (CRESTOR) 10 MG tablet Take 10 mg by mouth once daily.     "  FLUZONE HIGH-DOSE 2019-20, PF, 180 mcg/0.5 mL Syrg ADM 0.5ML IM UTD  Refills: 0      NOVOFINE AUTOCOVER 30 gauge x 1/3" Ndle              Discharge Procedure Orders (must include Diet, Follow-up, Activity)    Follow Up:  Follow up with PCP as per your routine.  Please follow a high fiber diet.  Activity as tolerated.      "

## 2020-01-09 NOTE — [employer] PATIENT INSTRUCTIONS
Discharge Summary/Instructions for after Colonoscopy with   Biopsy/Polypectomy  Rebeca Turner    Thursday, January 09, 2020  Will Cr MD  RESTRICTIONS ON ACTIVITY:  - Do not drive a car or operate machinery until the day after the procedure.      - The following day: return to full activity including work.  - For  3 days: No heavy lifting, straining or running.  - Diet: You can have solid foods, but no gassy foods (i.e. beans, broccoli,   cabbage, etc).  TREATMENT FOR COMMON SIDE EFFECTS:  - Mild abdominal pain and bloating or excessive gas: rest, eat lightly and   use a heating pad.  SYMPTOMS TO WATCH FOR AND REPORT TO YOUR PHYSICIAN:  1. Severe abdominal pain.  2. Fever within 24 hours after a procedure.  3. A large amount of rectal bleeding. (A small amount of blood from the   rectum is not serious, especially if hemorrhoids are present.  3.  Because air was put into your colon during the procedure, expelling   large amounts of air from your rectum is normal.  4.  You may not have a bowel movement for 1-3 days because of the   colonoscopy prep.  This is normal.  5.  Call immediately if you notice any of the following:   Chills and/or fever over 101   Persistent vomiting   Severe abdominal pain, other than gas cramps   Severe chest pain   Black, tarry stools   Any bleeding - exceeding one tablespoon  Your doctor recommends these additional instructions:  We are waiting for your pathology results.   Eat a high fiber diet.   Take a fiber supplement, for example Citrucel, Fibercon, Konsyl or   Metamucil.   Continue your present medications.   Take Colace capsule(s) by mouth 100 mg once a day as needed.   Your physician has indicated that a repeat colonoscopy is not recommended   due to your current age (79 years or older).  None  If you have any questions or problems, please call your physician.  EMERGENCY PHONE NUMBER: [phone]  LAB RESULTS: Call in two (2) weeks for lab results,  [phone]  ___________________________________________  Nurse Signature  ___________________________________________  Patient/Designated Responsible Party Signature  Will Cr MD  1/9/2020 9:39:17 AM  This report has been verified and signed electronically.  [employer]

## 2020-01-10 VITALS
SYSTOLIC BLOOD PRESSURE: 128 MMHG | HEIGHT: 63 IN | RESPIRATION RATE: 18 BRPM | HEART RATE: 64 BPM | WEIGHT: 177 LBS | BODY MASS INDEX: 31.36 KG/M2 | DIASTOLIC BLOOD PRESSURE: 70 MMHG | OXYGEN SATURATION: 98 % | TEMPERATURE: 98 F

## 2020-01-15 LAB
FINAL PATHOLOGIC DIAGNOSIS: NORMAL
GROSS: NORMAL

## 2020-01-22 ENCOUNTER — TELEPHONE (OUTPATIENT)
Dept: NEUROLOGY | Facility: CLINIC | Age: 80
End: 2020-01-22

## 2020-01-22 NOTE — TELEPHONE ENCOUNTER
----- Message from Peyton Dugan sent at 1/22/2020  8:46 AM CST -----  Contact: Thor Monsalve, from [facility], called to schedule appt for patient. Received recall letter.    Please call: [phone]

## 2020-01-23 ENCOUNTER — TELEPHONE (OUTPATIENT)
Dept: NEUROLOGY | Facility: CLINIC | Age: 80
End: 2020-01-23

## 2020-01-23 NOTE — TELEPHONE ENCOUNTER
----- Message from Claudia Monae sent at 1/23/2020  8:45 AM CST -----  Contact: The nursing assist at [facility]  The nursing assistant at the faculty is calling returning the nurse phone call about setting up an appointment for the pt.....[phone]([place])

## 2020-01-24 ENCOUNTER — LAB VISIT (OUTPATIENT)
Dept: LAB | Facility: HOSPITAL | Age: 80
End: 2020-01-24
Attending: INTERNAL MEDICINE
Payer: MEDICARE

## 2020-01-24 DIAGNOSIS — R53.83 FATIGUE: ICD-10-CM

## 2020-01-24 DIAGNOSIS — E11.9 DIABETES MELLITUS WITHOUT COMPLICATION: Primary | ICD-10-CM

## 2020-01-24 LAB
ALBUMIN SERPL BCP-MCNC: 3.1 G/DL (ref 3.5–5.2)
ALP SERPL-CCNC: 143 U/L (ref 55–135)
ALT SERPL W/O P-5'-P-CCNC: 18 U/L (ref 10–44)
ANION GAP SERPL CALC-SCNC: 8 MMOL/L (ref 8–16)
AST SERPL-CCNC: 13 U/L (ref 10–40)
BASOPHILS # BLD AUTO: 0.03 K/UL (ref 0–0.2)
BASOPHILS NFR BLD: 0.4 % (ref 0–1.9)
BILIRUB SERPL-MCNC: 0.1 MG/DL (ref 0.1–1)
BUN SERPL-MCNC: 24 MG/DL (ref 8–23)
CALCIUM SERPL-MCNC: 9.6 MG/DL (ref 8.7–10.5)
CHLORIDE SERPL-SCNC: 100 MMOL/L (ref 95–110)
CO2 SERPL-SCNC: 33 MMOL/L (ref 23–29)
CREAT SERPL-MCNC: 1 MG/DL (ref 0.5–1.4)
DIFFERENTIAL METHOD: ABNORMAL
EOSINOPHIL # BLD AUTO: 0.2 K/UL (ref 0–0.5)
EOSINOPHIL NFR BLD: 1.8 % (ref 0–8)
ERYTHROCYTE [DISTWIDTH] IN BLOOD BY AUTOMATED COUNT: 13.2 % (ref 11.5–14.5)
EST. GFR  (AFRICAN AMERICAN): >60 ML/MIN/1.73 M^2
EST. GFR  (NON AFRICAN AMERICAN): 53.7 ML/MIN/1.73 M^2
GLUCOSE SERPL-MCNC: 142 MG/DL (ref 70–110)
HCT VFR BLD AUTO: 33.5 % (ref 37–48.5)
HGB BLD-MCNC: 10 G/DL (ref 12–16)
IMM GRANULOCYTES # BLD AUTO: 0.02 K/UL (ref 0–0.04)
IMM GRANULOCYTES NFR BLD AUTO: 0.2 % (ref 0–0.5)
LYMPHOCYTES # BLD AUTO: 0.9 K/UL (ref 1–4.8)
LYMPHOCYTES NFR BLD: 10 % (ref 18–48)
MCH RBC QN AUTO: 29.6 PG (ref 27–31)
MCHC RBC AUTO-ENTMCNC: 29.9 G/DL (ref 32–36)
MCV RBC AUTO: 99 FL (ref 82–98)
MONOCYTES # BLD AUTO: 0.8 K/UL (ref 0.3–1)
MONOCYTES NFR BLD: 9.9 % (ref 4–15)
NEUTROPHILS # BLD AUTO: 6.6 K/UL (ref 1.8–7.7)
NEUTROPHILS NFR BLD: 77.7 % (ref 38–73)
NRBC BLD-RTO: 0 /100 WBC
PLATELET # BLD AUTO: 226 K/UL (ref 150–350)
PMV BLD AUTO: 10.9 FL (ref 9.2–12.9)
POTASSIUM SERPL-SCNC: 3.9 MMOL/L (ref 3.5–5.1)
PROT SERPL-MCNC: 6.6 G/DL (ref 6–8.4)
RBC # BLD AUTO: 3.38 M/UL (ref 4–5.4)
SODIUM SERPL-SCNC: 141 MMOL/L (ref 136–145)
WBC # BLD AUTO: 8.46 K/UL (ref 3.9–12.7)

## 2020-01-24 PROCEDURE — 80053 COMPREHEN METABOLIC PANEL: CPT

## 2020-01-24 PROCEDURE — 36415 COLL VENOUS BLD VENIPUNCTURE: CPT | Mod: PO

## 2020-01-24 PROCEDURE — 85025 COMPLETE CBC W/AUTO DIFF WBC: CPT

## 2020-02-17 ENCOUNTER — LAB VISIT (OUTPATIENT)
Dept: LAB | Facility: HOSPITAL | Age: 80
End: 2020-02-17
Attending: NURSE PRACTITIONER
Payer: MEDICARE

## 2020-02-17 DIAGNOSIS — N18.30 CKD (CHRONIC KIDNEY DISEASE) STAGE 3, GFR 30-59 ML/MIN: ICD-10-CM

## 2020-02-17 DIAGNOSIS — D50.9 IRON DEFICIENCY ANEMIA, UNSPECIFIED IRON DEFICIENCY ANEMIA TYPE: ICD-10-CM

## 2020-02-17 DIAGNOSIS — D64.9 CHRONIC ANEMIA: ICD-10-CM

## 2020-02-17 LAB
ALBUMIN SERPL BCP-MCNC: 3.4 G/DL (ref 3.5–5.2)
ALP SERPL-CCNC: 143 U/L (ref 55–135)
ALT SERPL W/O P-5'-P-CCNC: 22 U/L (ref 10–44)
ANION GAP SERPL CALC-SCNC: 10 MMOL/L (ref 8–16)
AST SERPL-CCNC: 14 U/L (ref 10–40)
BASOPHILS # BLD AUTO: 0.03 K/UL (ref 0–0.2)
BASOPHILS NFR BLD: 0.5 % (ref 0–1.9)
BILIRUB SERPL-MCNC: 0.2 MG/DL (ref 0.1–1)
BUN SERPL-MCNC: 33 MG/DL (ref 8–23)
CALCIUM SERPL-MCNC: 9.2 MG/DL (ref 8.7–10.5)
CHLORIDE SERPL-SCNC: 103 MMOL/L (ref 95–110)
CO2 SERPL-SCNC: 31 MMOL/L (ref 23–29)
CREAT SERPL-MCNC: 1.2 MG/DL (ref 0.5–1.4)
DIFFERENTIAL METHOD: ABNORMAL
EOSINOPHIL # BLD AUTO: 0.1 K/UL (ref 0–0.5)
EOSINOPHIL NFR BLD: 2 % (ref 0–8)
ERYTHROCYTE [DISTWIDTH] IN BLOOD BY AUTOMATED COUNT: 13.8 % (ref 11.5–14.5)
EST. GFR  (AFRICAN AMERICAN): 50 ML/MIN/1.73 M^2
EST. GFR  (NON AFRICAN AMERICAN): 43 ML/MIN/1.73 M^2
FERRITIN SERPL-MCNC: 68 NG/ML (ref 20–300)
GLUCOSE SERPL-MCNC: 130 MG/DL (ref 70–110)
HCT VFR BLD AUTO: 32.9 % (ref 37–48.5)
HGB BLD-MCNC: 10.4 G/DL (ref 12–16)
IRON SERPL-MCNC: 52 UG/DL (ref 30–160)
LYMPHOCYTES # BLD AUTO: 0.8 K/UL (ref 1–4.8)
LYMPHOCYTES NFR BLD: 12.9 % (ref 18–48)
MCH RBC QN AUTO: 30 PG (ref 27–31)
MCHC RBC AUTO-ENTMCNC: 31.6 G/DL (ref 32–36)
MCV RBC AUTO: 95 FL (ref 82–98)
MONOCYTES # BLD AUTO: 0.6 K/UL (ref 0.3–1)
MONOCYTES NFR BLD: 10.5 % (ref 4–15)
NEUTROPHILS # BLD AUTO: 4.5 K/UL (ref 1.8–7.7)
NEUTROPHILS NFR BLD: 74.1 % (ref 38–73)
PLATELET # BLD AUTO: 240 K/UL (ref 150–350)
PMV BLD AUTO: 9.8 FL (ref 9.2–12.9)
POTASSIUM SERPL-SCNC: 4.4 MMOL/L (ref 3.5–5.1)
PROT SERPL-MCNC: 6.7 G/DL (ref 6–8.4)
RBC # BLD AUTO: 3.47 M/UL (ref 4–5.4)
SATURATED IRON: 15 % (ref 20–50)
SODIUM SERPL-SCNC: 144 MMOL/L (ref 136–145)
TOTAL IRON BINDING CAPACITY: 337 UG/DL (ref 250–450)
TRANSFERRIN SERPL-MCNC: 228 MG/DL (ref 200–375)
WBC # BLD AUTO: 6.12 K/UL (ref 3.9–12.7)

## 2020-02-17 PROCEDURE — 83540 ASSAY OF IRON: CPT

## 2020-02-17 PROCEDURE — 36415 COLL VENOUS BLD VENIPUNCTURE: CPT | Mod: PO

## 2020-02-17 PROCEDURE — 82728 ASSAY OF FERRITIN: CPT

## 2020-02-17 PROCEDURE — 85025 COMPLETE CBC W/AUTO DIFF WBC: CPT | Mod: PO

## 2020-02-17 PROCEDURE — 80053 COMPREHEN METABOLIC PANEL: CPT | Mod: PO

## 2020-02-24 ENCOUNTER — OFFICE VISIT (OUTPATIENT)
Dept: HEMATOLOGY/ONCOLOGY | Facility: CLINIC | Age: 80
End: 2020-02-24
Payer: MEDICARE

## 2020-02-24 VITALS
SYSTOLIC BLOOD PRESSURE: 140 MMHG | WEIGHT: 177.94 LBS | HEIGHT: 63 IN | OXYGEN SATURATION: 98 % | TEMPERATURE: 98 F | BODY MASS INDEX: 31.53 KG/M2 | DIASTOLIC BLOOD PRESSURE: 59 MMHG | RESPIRATION RATE: 20 BRPM | HEART RATE: 63 BPM

## 2020-02-24 DIAGNOSIS — D50.9 IRON DEFICIENCY ANEMIA, UNSPECIFIED IRON DEFICIENCY ANEMIA TYPE: Primary | ICD-10-CM

## 2020-02-24 PROCEDURE — 99213 OFFICE O/P EST LOW 20 MIN: CPT | Mod: S$PBB,,, | Performed by: NURSE PRACTITIONER

## 2020-02-24 PROCEDURE — 99999 PR PBB SHADOW E&M-EST. PATIENT-LVL V: CPT | Mod: PBBFAC,,, | Performed by: NURSE PRACTITIONER

## 2020-02-24 PROCEDURE — 99213 PR OFFICE/OUTPT VISIT, EST, LEVL III, 20-29 MIN: ICD-10-PCS | Mod: S$PBB,,, | Performed by: NURSE PRACTITIONER

## 2020-02-24 PROCEDURE — 99215 OFFICE O/P EST HI 40 MIN: CPT | Mod: PBBFAC,PN | Performed by: NURSE PRACTITIONER

## 2020-02-24 PROCEDURE — 99999 PR PBB SHADOW E&M-EST. PATIENT-LVL V: ICD-10-PCS | Mod: PBBFAC,,, | Performed by: NURSE PRACTITIONER

## 2020-02-24 NOTE — PROGRESS NOTES
HISTORY OF PRESENT ILLNESS:  This is a 79-year-old mentally challenged white female known to Dr. Voss for a diagnosis regarding hypochromic normocytic anemia in association with elevated alkaline phosphatase levels.  She tried a course of   ICAR-C followed by MVI with iron.    She presents to the clinic today with her sitter for interval evaluation as she resides in a group home in [facility].  She is upbeat and talkative.  She states she walks a lot daily and works her assigned job without difficulties.  She denies any fatigue, weakness, headaches, blurred vision, melena, pica, abdominal discomfort, nausea, vomiting, constipation, diarrhea, fevers, chills, etc.  No other new complaints or pertinent findings on a 14-point review of systems.    PHYSICAL EXAMINATION:  GENERAL:  Well-developed, well-nourished elderly white female, mentally challenged, in no acute distress.  Alert and oriented to person and place.  VITAL SIGNS:  Weight:  stable   Wt Readings from Last 3 Encounters:   02/24/20 80.7 kg (177 lb 14.6 oz)   01/09/20 80.3 kg (177 lb)   12/05/19 82 kg (180 lb 12.4 oz)     Temp Readings from Last 3 Encounters:   02/24/20 98.1 °F (36.7 °C)   01/09/20 97.5 °F (36.4 °C) (Skin)   02/22/19 98.4 °F (36.9 °C)     BP Readings from Last 3 Encounters:   02/24/20 (!) 140/59   01/09/20 128/70   12/30/19 (!) 142/45     Pulse Readings from Last 3 Encounters:   02/24/20 63   01/09/20 64   12/30/19 (!) 58     HEENT:  Normocephalic, atraumatic.  Oral mucosa pink and moist.  Lips without lesions.  Tongue midline.  Oropharynx clear.  Nonicteric sclerae. Erythematous nares with secretions.  NECK:  Supple, no adenopathy.  No carotid bruits, thyromegaly or thyroid nodule.  HEART:  Regular rate and rhythm without murmur, gallop or rub.   LUNGS:  Scattered wheezes.  Normal respiratory effort.   ABDOMEN:  Soft, nontender, nondistended with positive normoactive bowel sounds, no hepatosplenomegaly.  EXTREMITIES:  No cyanosis,  clubbing or edema.  Distal pulses are intact.     LABORATORY:   Lab Results   Component Value Date    WBC 6.12 02/17/2020    RBC 3.47 (L) 02/17/2020    HGB 10.4 (L) 02/17/2020    HCT 32.9 (L) 02/17/2020    MCV 95 02/17/2020    MCH 30.0 02/17/2020    MCHC 31.6 (L) 02/17/2020    RDW 13.8 02/17/2020     02/17/2020    MPV 9.8 02/17/2020    GRAN 4.5 02/17/2020    GRAN 74.1 (H) 02/17/2020    LYMPH 0.8 (L) 02/17/2020    LYMPH 12.9 (L) 02/17/2020    MONO 0.6 02/17/2020    MONO 10.5 02/17/2020    EOS 0.1 02/17/2020    BASO 0.03 02/17/2020    EOSINOPHIL 2.0 02/17/2020    BASOPHIL 0.5 02/17/2020     Lab Results   Component Value Date    IRON 52 02/17/2020    TIBC 337 02/17/2020    FERRITIN 68 02/17/2020     CMP  Sodium   Date Value Ref Range Status   02/17/2020 144 136 - 145 mmol/L Final     Potassium   Date Value Ref Range Status   02/17/2020 4.4 3.5 - 5.1 mmol/L Final     Chloride   Date Value Ref Range Status   02/17/2020 103 95 - 110 mmol/L Final     CO2   Date Value Ref Range Status   02/17/2020 31 (H) 23 - 29 mmol/L Final     Glucose   Date Value Ref Range Status   02/17/2020 130 (H) 70 - 110 mg/dL Final     BUN, Bld   Date Value Ref Range Status   02/17/2020 33 (H) 8 - 23 mg/dL Final     Creatinine   Date Value Ref Range Status   02/17/2020 1.2 0.5 - 1.4 mg/dL Final     Calcium   Date Value Ref Range Status   02/17/2020 9.2 8.7 - 10.5 mg/dL Final     Total Protein   Date Value Ref Range Status   02/17/2020 6.7 6.0 - 8.4 g/dL Final     Albumin   Date Value Ref Range Status   02/17/2020 3.4 (L) 3.5 - 5.2 g/dL Final     Total Bilirubin   Date Value Ref Range Status   02/17/2020 0.2 0.1 - 1.0 mg/dL Final     Comment:     For infants and newborns, interpretation of results should be based  on gestational age, weight and in agreement with clinical  observations.  Premature Infant recommended reference ranges:  Up to 24 hours.............<8.0 mg/dL  Up to 48 hours............<12.0 mg/dL  3-5 days..................<15.0  mg/dL  6-29 days.................<15.0 mg/dL       Alkaline Phosphatase   Date Value Ref Range Status   02/17/2020 143 (H) 55 - 135 U/L Final     AST   Date Value Ref Range Status   02/17/2020 14 10 - 40 U/L Final     ALT   Date Value Ref Range Status   02/17/2020 22 10 - 44 U/L Final     Anion Gap   Date Value Ref Range Status   02/17/2020 10 8 - 16 mmol/L Final     eGFR if    Date Value Ref Range Status   02/17/2020 50 (A) >60 mL/min/1.73 m^2 Final     eGFR if non    Date Value Ref Range Status   02/17/2020 43 (A) >60 mL/min/1.73 m^2 Final     Comment:     Calculation used to obtain the estimated glomerular filtration  rate (eGFR) is the CKD-EPI equation.        Lab Results   Component Value Date    HGBA1C 6.8 (H) 11/08/2019      Colonoscopy:  01/09/2020:  Impression:            - Two 2 to 4 mm polyps in the proximal transverse                        colon, removed with a cold snare. Resected and                        retrieved.                       - One 1 to 2 mm polyp in the cecum, removed with a                        cold biopsy forceps. Resected and retrieved.                       - Diverticulosis in the sigmoid colon and in the                        descending colon.                       - Non-bleeding internal hemorrhoids.                       - Patent functional end-to-end ileo-colonic                        anastomosis, characterized by healthy appearing                        mucosa.                       - The examination was otherwise normal.                       - The examined portion of the ileum was normal.    Pathology:  Benign    IMPRESSION:  1.  Anemia of chronic disease:  Stable, asymptomatic  2.  CKD III.  3.  Fatty infiltration of the liver.  4.  Elevated alk phos - stable.  5.  HTN - follow with primary  6.  H/O Vincent HAGEN - follows with Dr. Hung    PLAN:    1.  Continue MVI with low iron (I) daily.  2.  Return to clinic in 6 months with  interval CBC, CMP, Iron studies/ferritin prior.    Assessment/plan reviewed and approved by Dr. Palafox.

## 2020-03-02 ENCOUNTER — PROCEDURE VISIT (OUTPATIENT)
Dept: OPHTHALMOLOGY | Facility: CLINIC | Age: 80
End: 2020-03-02
Payer: MEDICARE

## 2020-03-02 DIAGNOSIS — H35.3211 EXUDATIVE AGE-RELATED MACULAR DEGENERATION OF RIGHT EYE WITH ACTIVE CHOROIDAL NEOVASCULARIZATION: Primary | ICD-10-CM

## 2020-03-02 DIAGNOSIS — H35.3222 EXUDATIVE AGE-RELATED MACULAR DEGENERATION OF LEFT EYE WITH INACTIVE CHOROIDAL NEOVASCULARIZATION: ICD-10-CM

## 2020-03-02 DIAGNOSIS — H43.393 VITREOUS FLOATERS OF BOTH EYES: ICD-10-CM

## 2020-03-02 PROCEDURE — 67028 INJECTION EYE DRUG: CPT | Mod: S$PBB,RT,, | Performed by: OPHTHALMOLOGY

## 2020-03-02 PROCEDURE — C9257 BEVACIZUMAB INJECTION: HCPCS | Mod: PBBFAC,JG,PO | Performed by: OPHTHALMOLOGY

## 2020-03-02 PROCEDURE — 92134 POSTERIOR SEGMENT OCT RETINA (OCULAR COHERENCE TOMOGRAPHY)-BOTH EYES: ICD-10-PCS | Mod: 26,S$PBB,, | Performed by: OPHTHALMOLOGY

## 2020-03-02 PROCEDURE — 92014 COMPRE OPH EXAM EST PT 1/>: CPT | Mod: 25,S$PBB,, | Performed by: OPHTHALMOLOGY

## 2020-03-02 PROCEDURE — 67028 PR INJECT INTRAVITREAL PHARMCOLOGIC: ICD-10-PCS | Mod: S$PBB,RT,, | Performed by: OPHTHALMOLOGY

## 2020-03-02 PROCEDURE — 92134 CPTRZ OPH DX IMG PST SGM RTA: CPT | Mod: PBBFAC,PO | Performed by: OPHTHALMOLOGY

## 2020-03-02 PROCEDURE — 92014 PR EYE EXAM, EST PATIENT,COMPREHESV: ICD-10-PCS | Mod: 25,S$PBB,, | Performed by: OPHTHALMOLOGY

## 2020-03-02 PROCEDURE — 67028 INJECTION EYE DRUG: CPT | Mod: PBBFAC,PO,RT | Performed by: OPHTHALMOLOGY

## 2020-03-02 RX ADMIN — BEVACIZUMAB 1.25 MG: 100 INJECTION, SOLUTION INTRAVENOUS at 11:03

## 2020-03-02 NOTE — PATIENT INSTRUCTIONS

## 2020-03-02 NOTE — PROGRESS NOTES
HPI     9-10 wk OCT   DLS- 12/30/22019 Dr. Sue     Pt sts vision about the same still hard to see near.   Denies pain   (-)Flashes (-)Floaters  (-)Photophobia  (-)Glare    No gtts       OCT - OD  PED's with SRF - improving  OS - SR Fibrosis - NO SRF      A/P    1. Wet AMD OS  S/p Avastin injections outside  Stable today with cicatrix  Observe    2. Wet AMD OD  S/p Avastin OD x 14  S/p Eylea OD x 12    Avastin OD    Alternate tx      AREDS/AG    3. DM  No significant DR  BS/BP/chol control    4. PCIOL OU      9-10 weeks OCT      Risks, benefits, and alternatives to treatment discussed in detail with the patient.  The patient voiced understanding and wished to proceed with the procedure    Injection Procedure Note:  Diagnosis: Wet AMD OD    Patient Identified and Time Out complete  Topical Proparacaine and Betadine.  Inject Avastin OD at 6:00 @ 3.5-4mm posterior to limbus  Post Operative Dx: Same  Complications: None  Follow up as above.

## 2020-03-05 ENCOUNTER — OFFICE VISIT (OUTPATIENT)
Dept: PODIATRY | Facility: CLINIC | Age: 80
End: 2020-03-05
Payer: MEDICARE

## 2020-03-05 VITALS
HEART RATE: 64 BPM | DIASTOLIC BLOOD PRESSURE: 55 MMHG | WEIGHT: 177.94 LBS | HEIGHT: 63 IN | SYSTOLIC BLOOD PRESSURE: 177 MMHG | BODY MASS INDEX: 31.53 KG/M2

## 2020-03-05 DIAGNOSIS — E11.42 DIABETIC POLYNEUROPATHY ASSOCIATED WITH TYPE 2 DIABETES MELLITUS: Primary | ICD-10-CM

## 2020-03-05 DIAGNOSIS — M20.42 HAMMER TOES OF BOTH FEET: ICD-10-CM

## 2020-03-05 DIAGNOSIS — B35.1 ONYCHOMYCOSIS DUE TO DERMATOPHYTE: ICD-10-CM

## 2020-03-05 DIAGNOSIS — L84 CORN OR CALLUS: ICD-10-CM

## 2020-03-05 DIAGNOSIS — M20.41 HAMMER TOES OF BOTH FEET: ICD-10-CM

## 2020-03-05 PROCEDURE — 11721 PR DEBRIDEMENT OF NAILS, 6 OR MORE: ICD-10-PCS | Mod: 59,Q9,S$PBB, | Performed by: PODIATRIST

## 2020-03-05 PROCEDURE — 99213 OFFICE O/P EST LOW 20 MIN: CPT | Mod: 25,S$PBB,, | Performed by: PODIATRIST

## 2020-03-05 PROCEDURE — 11057 PARNG/CUTG B9 HYPRKR LES >4: CPT | Mod: PBBFAC,PN | Performed by: PODIATRIST

## 2020-03-05 PROCEDURE — 11057 PR TRIM BENIGN HYPERKERATOTIC SKIN LESION,>4: ICD-10-PCS | Mod: Q9,S$PBB,, | Performed by: PODIATRIST

## 2020-03-05 PROCEDURE — 11057 PARNG/CUTG B9 HYPRKR LES >4: CPT | Mod: Q9,S$PBB,, | Performed by: PODIATRIST

## 2020-03-05 PROCEDURE — 99999 PR PBB SHADOW E&M-EST. PATIENT-LVL IV: CPT | Mod: PBBFAC,,, | Performed by: PODIATRIST

## 2020-03-05 PROCEDURE — 99214 OFFICE O/P EST MOD 30 MIN: CPT | Mod: PBBFAC,PN,25 | Performed by: PODIATRIST

## 2020-03-05 PROCEDURE — 99213 PR OFFICE/OUTPT VISIT, EST, LEVL III, 20-29 MIN: ICD-10-PCS | Mod: 25,S$PBB,, | Performed by: PODIATRIST

## 2020-03-05 PROCEDURE — 11721 DEBRIDE NAIL 6 OR MORE: CPT | Mod: 59,Q9,S$PBB, | Performed by: PODIATRIST

## 2020-03-05 PROCEDURE — 99999 PR PBB SHADOW E&M-EST. PATIENT-LVL IV: ICD-10-PCS | Mod: PBBFAC,,, | Performed by: PODIATRIST

## 2020-03-05 PROCEDURE — 11721 DEBRIDE NAIL 6 OR MORE: CPT | Mod: PBBFAC,PN,59 | Performed by: PODIATRIST

## 2020-03-05 NOTE — PROGRESS NOTES
Patient ID: Rebeca Turner is a 79 y.o. female.    Chief Complaint: Routine Foot Care (3 month f/u, PCP--02/21/2020) and Diabetes Mellitus (A1c-6.8-11/08/2019)    Rebeca is a 79 y.o. female who presents to the clinic for evaluation and treatment of diabetic feet. Rebeca has a past medical history of Anemia of chronic disease, CKD (chronic kidney disease) stage 3, GFR 30-59 ml/min, Colon polyp, DM type 2 (diabetes mellitus, type 2), DVT (deep venous thrombosis), Dysplastic nevi, Fatty liver, GERD (gastroesophageal reflux disease), H/O esophagogastroduodenoscopy, History of endometrial cancer, HTN (hypertension), Hyperlipidemia, LVE (left ventricular enlargement), Macular degeneration, MR (mental retardation), Obesity, LUISANA (obstructive sleep apnea), Osteoporosis, S/P colonoscopy, SBO (small bowel obstruction), Seizure disorder, and Vitreous detachment. Patient relates no major problem with feet. Only complaints today consist of toenails and calluses in need of trimming.  Denies being painful with wearing shoe gear.  Continues to apply moisturizer to areas of callus build up daily.  Notes decent control over her blood sugar with this morning's reading being 110.  Denies any additional pedal complaints.    PCP: JUSTICE Prado MD    Date Last Seen by PCP: 2/20    Hemoglobin A1C   Date Value Ref Range Status   11/08/2019 6.8 (H) 4.0 - 5.6 % Final     Comment:     ADA Screening Guidelines:  5.7-6.4%  Consistent with prediabetes  >or=6.5%  Consistent with diabetes  High levels of fetal hemoglobin interfere with the HbA1C  assay. Heterozygous hemoglobin variants (HbS, HgC, etc)do  not significantly interfere with this assay.   However, presence of multiple variants may affect accuracy.     06/05/2019 7.0 (H) 4.0 - 5.6 % Final     Comment:     ADA Screening Guidelines:  5.7-6.4%  Consistent with prediabetes  >or=6.5%  Consistent with diabetes  High levels of fetal hemoglobin interfere with the HbA1C  assay.  Heterozygous hemoglobin variants (HbS, HgC, etc)do  not significantly interfere with this assay.   However, presence of multiple variants may affect accuracy.     12/14/2018 6.2 (H) 4.0 - 5.6 % Final     Comment:     ADA Screening Guidelines:  5.7-6.4%  Consistent with prediabetes  >or=6.5%  Consistent with diabetes  High levels of fetal hemoglobin interfere with the HbA1C  assay. Heterozygous hemoglobin variants (HbS, HgC, etc)do  not significantly interfere with this assay.   However, presence of multiple variants may affect accuracy.             Past Medical History:   Diagnosis Date    Anemia of chronic disease     hematology su neg    CKD (chronic kidney disease) stage 3, GFR 30-59 ml/min     Colon polyp     DM type 2 (diabetes mellitus, type 2)     DVT (deep venous thrombosis)     x 2 both post-op with last 6/16    Dysplastic nevi     h/o    Fatty liver     noted on usg 9/15    GERD (gastroesophageal reflux disease)     H/O esophagogastroduodenoscopy     normal 11/10    History of endometrial cancer     stage 1;  s/p SENDY with BSO 10/06    HTN (hypertension)     Hyperlipidemia     LVE (left ventricular enlargement)     noted on 6/15 ECHO    Macular degeneration     exudative    MR (mental retardation)     Obesity     LUISANA (obstructive sleep apnea)     su in progress, uses bipap    Osteoporosis     osteopenia noted on 2/13 and 7/15 dexa    S/P colonoscopy     last 10/15;  next 10/19    SBO (small bowel obstruction)     h/o recurrent SBO;  s/p R hemicolectomy 12/10 and s/p incarcerated hernia repair with extensive scar tissue 6/16    Seizure disorder     Vitreous detachment        Past Surgical History:   Procedure Laterality Date    ABDOMINAL SURGERY  06/2016    Incarcerated incisional hernia, enterotomy x3, and extensive adhesions      CATARACT EXTRACTION      B 6/11    CHOLECYSTECTOMY      COLON SURGERY  12/6/2010  (Ms. Dinorah)    R hemicolectomy 12/10 due to recurrent SBO from benign  colon mass 12/2010.   Benign albeit large polyp.    COLONOSCOPY N/A 10/7/2015    Dr. Cr; polyps removed; diverticulosis; internal hemorrhoids; repeat in 4-5 years    COLONOSCOPY N/A 1/9/2020    Procedure: COLONOSCOPY;  Surgeon: Will Cr Jr., MD;  Location: [facility];  Service: Endoscopy;  Laterality: N/A;    COLONOSCOPY W/ POLYPECTOMY  11/22/2010  Dinorah, Ms.    3 cm growth mid ascending colon.  TUBULOVILLOUS ADENOMA. 9 mm polyp mid transverse colon.  TUBULAR ADENOMA.  10 mm polyp distal descending colon.  TUBULOVILLOUS ADENOMA.     ESOPHAGOGASTRODUODENOSCOPY  11/16/2010    Normal EGD.    HERNIA REPAIR  8/2015    from incarcerated hernia    HYSTERECTOMY  10/17/2006    SENDY and BSO 10/06 for stage 1 endometrial cancer    MYRINGOTOMY W/ TUBES      bilateral    TONSILLECTOMY         Family History   Problem Relation Age of Onset    Cancer Sister         details unknown    Heart attack Father     Pulmonary embolism Mother        Social History     Socioeconomic History    Marital status: Single     Spouse name: Not on file    Number of children: Not on file    Years of education: Not on file    Highest education level: Not on file   Occupational History    Not on file   Social Needs    Financial resource strain: Not on file    Food insecurity:     Worry: Not on file     Inability: Not on file    Transportation needs:     Medical: Not on file     Non-medical: Not on file   Tobacco Use    Smoking status: Never Smoker    Smokeless tobacco: Never Used   Substance and Sexual Activity    Alcohol use: No     Alcohol/week: 0.0 standard drinks    Drug use: No    Sexual activity: Never   Lifestyle    Physical activity:     Days per week: Not on file     Minutes per session: Not on file    Stress: Not on file   Relationships    Social connections:     Talks on phone: Not on file     Gets together: Not on file     Attends [employer] service: Not on file     Active member of club or organization:  Not on file     Attends meetings of clubs or organizations: Not on file     Relationship status: Not on file   Other Topics Concern    Not on file   Social History Narrative    Lives at [facility].       Current Outpatient Prescriptions   Medication Sig Dispense Refill    alendronate (FOSAMAX) 70 MG tablet TAKE ONE TABLET WEEKLY AS DIRECTED ON THURSDAY 4 tablet 0    calcium carbonate-vitamin D3 (CALCIUM 600 + D,3,) 600 mg calcium- 200 unit Cap Take 1 capsule by mouth 2 (two) times daily.      CRESTOR 10 mg tablet TAKE ONE TABLET BY MOUTH EVERY NIGHT AT BEDTIME 30 tablet 0    diltiaZEM (CARDIZEM CD) 360 MG 24 hr capsule       dimethicone-kelvin-A-C-E-aloe (GOLD BOND ULTIMATE DIABETICS') 3-30 % Crea Apply topically 2 (two) times daily. APPLY TO FEET      docusate sodium (COLACE) 100 MG capsule Take 100 mg by mouth 2 (two) times daily.      fish oil-omega-3 fatty acids 300-1,000 mg capsule Take 2 g by mouth once daily.      hydrALAZINE (APRESOLINE) 50 MG tablet Take 50 mg by mouth every 12 (twelve) hours.      hydroCHLOROthiazide (HYDRODIURIL) 25 MG tablet       iron-vitamin C 100-250 mg, ICAR-C, 100-250 mg Tab Take one tablet in the morning with an acidic drink before breakfast. 90 tablet 0    labetalol (NORMODYNE) 300 MG tablet TAKE ONE TABLET BY MOUTH TWICE DAILY FOR BLOOD PRESSURE 60 tablet 0    LANTUS SOLOSTAR 100 unit/mL (3 mL) InPn pen Inject 35 Units into the skin every evening.       lisinopril (PRINIVIL,ZESTRIL) 20 MG tablet       lutein 20 mg Cap TAKE ONE SOFTGEL BY MOUTH EVERY MORNING 30 each 0    multivitamin capsule Take 1 capsule by mouth once daily.      polyethylene glycol (GLYCOLAX) 17 gram/dose powder       TEGRETOL  mg 12 hr tablet TAKE ONE TABLET BY MOUTH ONCE DAILY SEIZURES (Patient taking differently: one tablet bid) 30 tablet 0     No current facility-administered medications for this visit.        Review of patient's allergies indicates:  No Known Allergies      Review of  Systems   Constitution: Negative for chills and fever.   Cardiovascular: Negative for claudication and leg swelling.   Skin: Positive for color change, nail changes and suspicious lesions.   Musculoskeletal: Negative for arthritis, joint pain and joint swelling.   Gastrointestinal: Negative for nausea and vomiting.   Neurological: Positive for numbness. Negative for paresthesias.   Psychiatric/Behavioral: Negative for altered mental status.           Objective:      Physical Exam   Constitutional: She is oriented to person, place, and time. She appears well-developed and well-nourished. No distress.   Cardiovascular:   Pulses:       Dorsalis pedis pulses are 2+ on the right side, and 2+ on the left side.        Posterior tibial pulses are 1+ on the right side, and 1+ on the left side.   CFT <3 seconds bilateral.  Pedal hair growth decreased bilateral.  Varicosities noted to bilateral lower extremity.  Mild nonpitting edema noted to bilateral lower extremity.  Toes are cool to touch bilateral.     Musculoskeletal: She exhibits edema. She exhibits no tenderness.   Muscle strength 5/5 in all muscle groups bilateral.  No tenderness nor crepitation with ROM of foot/ankle joints bilateral.  No tenderness with palpation of bilateral foot and ankle.  Bilateral pes planus foot type.  Bilateral semi-rigid contracture of toes 2-5.   Neurological: She is alert and oriented to person, place, and time. She has normal strength. A sensory deficit is present.   Protective sensation per Java-Julianne monofilament decreased bilateral.    Vibratory sensation decreased bilateral.    Light touch intact bilateral.   Skin: Skin is warm, dry and intact. Capillary refill takes less than 2 seconds. Lesion noted. No abrasion, no bruising, no burn, no ecchymosis, no laceration, no petechiae and no rash noted. She is not diaphoretic. No cyanosis or erythema. No pallor. Nails show no clubbing.   Pedal skin appears thin and atrophic bilateral.   Toenails x 10 appear thickened by 2 mm's, elongated by 4 mm's, and discolored with subungual debris.  Hyperkeratotic lesion noted to bilateral 3rd and 4th toes at the distal tip as well as to the Lt. 3rd and 4th sub met heads.  Multiple nevi noted to the dorsum of bilateral foot and lower leg.  No break noted in the adjacent skin integrity.              Assessment:       Encounter Diagnoses   Name Primary?    Diabetic polyneuropathy associated with type 2 diabetes mellitus Yes    Hammer toes of both feet     Corn or callus     Onychomycosis due to dermatophyte          Plan:       Rebeca was seen today for routine foot care and diabetes mellitus.    Diagnoses and all orders for this visit:    Diabetic polyneuropathy associated with type 2 diabetes mellitus    Hammer toes of both feet    Corn or callus    Onychomycosis due to dermatophyte      I counseled the patient on her conditions, their implications and medical management.    Advised to continue wearing shoe gear that accommodates for digital deformities.    To continue wearing the crest pad to offload the tips of the lesser digits on the Rt. Foot.    Shoe inspection. Diabetic Foot Education. Patient reminded of the importance of good nutrition and blood sugar control to help prevent podiatric complications of diabetes. Patient instructed on proper foot hygeine. We discussed wearing proper shoe gear, daily foot inspections, never walking without protective shoe gear, never putting sharp instruments to feet    With patient's permission, nails were aggressively reduced and debrided x 10 to their soft tissue attachment mechanically and with electric , removing all offending nail and debris.  Also, a sterile #15 scalpel was used to trim lesions x 6 down to smooth appearing skin without incident.  Patient relates relief following the procedure. She will continue to monitor the areas daily, inspect her feet, wear protective shoe gear when ambulatory,  moisturizer to maintain skin integrity and follow in this office in approximately 2-3 months, sooner p.r.n.    Follow up in about 3 months (around 6/5/2020).    Sae Snowden DPM

## 2020-03-27 ENCOUNTER — TELEPHONE (OUTPATIENT)
Dept: NEUROLOGY | Facility: CLINIC | Age: 80
End: 2020-03-27

## 2020-04-16 ENCOUNTER — LAB VISIT (OUTPATIENT)
Dept: LAB | Facility: HOSPITAL | Age: 80
End: 2020-04-16
Attending: INTERNAL MEDICINE
Payer: MEDICARE

## 2020-04-16 DIAGNOSIS — Z00.00 ROUTINE GENERAL MEDICAL EXAMINATION AT A HEALTH CARE FACILITY: ICD-10-CM

## 2020-04-16 DIAGNOSIS — Z51.81 ENCOUNTER FOR THERAPEUTIC DRUG MONITORING: ICD-10-CM

## 2020-04-16 DIAGNOSIS — Z79.01 LONG TERM (CURRENT) USE OF ANTICOAGULANTS: ICD-10-CM

## 2020-04-16 DIAGNOSIS — G40.909 EPILEPSY: ICD-10-CM

## 2020-04-16 DIAGNOSIS — I10 PRIMARY HYPERTENSION: ICD-10-CM

## 2020-04-16 LAB
ALBUMIN SERPL BCP-MCNC: 3.5 G/DL (ref 3.5–5.2)
ALP SERPL-CCNC: 154 U/L (ref 55–135)
ALT SERPL W/O P-5'-P-CCNC: 19 U/L (ref 10–44)
ANION GAP SERPL CALC-SCNC: 13 MMOL/L (ref 8–16)
AST SERPL-CCNC: 17 U/L (ref 10–40)
BASOPHILS # BLD AUTO: 0.03 K/UL (ref 0–0.2)
BASOPHILS NFR BLD: 1.4 % (ref 0–1.9)
BILIRUB SERPL-MCNC: 0.2 MG/DL (ref 0.1–1)
BUN SERPL-MCNC: 21 MG/DL (ref 8–23)
CALCIUM SERPL-MCNC: 9.8 MG/DL (ref 8.7–10.5)
CHLORIDE SERPL-SCNC: 104 MMOL/L (ref 95–110)
CHOLEST SERPL-MCNC: 123 MG/DL (ref 120–199)
CHOLEST/HDLC SERPL: 3.2 {RATIO} (ref 2–5)
CO2 SERPL-SCNC: 28 MMOL/L (ref 23–29)
CREAT SERPL-MCNC: 1 MG/DL (ref 0.5–1.4)
DIFFERENTIAL METHOD: ABNORMAL
EOSINOPHIL # BLD AUTO: 0.1 K/UL (ref 0–0.5)
EOSINOPHIL NFR BLD: 2.4 % (ref 0–8)
ERYTHROCYTE [DISTWIDTH] IN BLOOD BY AUTOMATED COUNT: 13.2 % (ref 11.5–14.5)
EST. GFR  (AFRICAN AMERICAN): >60 ML/MIN/1.73 M^2
EST. GFR  (NON AFRICAN AMERICAN): 53.7 ML/MIN/1.73 M^2
ESTIMATED AVG GLUCOSE: 140 MG/DL (ref 68–131)
GLUCOSE SERPL-MCNC: 105 MG/DL (ref 70–110)
HBA1C MFR BLD HPLC: 6.5 % (ref 4–5.6)
HCT VFR BLD AUTO: 37.7 % (ref 37–48.5)
HDLC SERPL-MCNC: 38 MG/DL (ref 40–75)
HDLC SERPL: 30.9 % (ref 20–50)
HGB BLD-MCNC: 11.2 G/DL (ref 12–16)
IMM GRANULOCYTES # BLD AUTO: 0.01 K/UL (ref 0–0.04)
IMM GRANULOCYTES NFR BLD AUTO: 0.5 % (ref 0–0.5)
LDLC SERPL CALC-MCNC: 44.2 MG/DL (ref 63–159)
LYMPHOCYTES # BLD AUTO: 0.6 K/UL (ref 1–4.8)
LYMPHOCYTES NFR BLD: 28.1 % (ref 18–48)
MCH RBC QN AUTO: 29.8 PG (ref 27–31)
MCHC RBC AUTO-ENTMCNC: 29.7 G/DL (ref 32–36)
MCV RBC AUTO: 100 FL (ref 82–98)
MONOCYTES # BLD AUTO: 0.3 K/UL (ref 0.3–1)
MONOCYTES NFR BLD: 13.8 % (ref 4–15)
NEUTROPHILS # BLD AUTO: 1.1 K/UL (ref 1.8–7.7)
NEUTROPHILS NFR BLD: 53.8 % (ref 38–73)
NONHDLC SERPL-MCNC: 85 MG/DL
NRBC BLD-RTO: 0 /100 WBC
PLATELET # BLD AUTO: 223 K/UL (ref 150–350)
PLATELET BLD QL SMEAR: ABNORMAL
PMV BLD AUTO: 11 FL (ref 9.2–12.9)
POTASSIUM SERPL-SCNC: 4 MMOL/L (ref 3.5–5.1)
PROT SERPL-MCNC: 7 G/DL (ref 6–8.4)
RBC # BLD AUTO: 3.76 M/UL (ref 4–5.4)
SODIUM SERPL-SCNC: 145 MMOL/L (ref 136–145)
TRIGL SERPL-MCNC: 204 MG/DL (ref 30–150)
TSH SERPL DL<=0.005 MIU/L-ACNC: 1.92 UIU/ML (ref 0.4–4)
WBC # BLD AUTO: 2.1 K/UL (ref 3.9–12.7)

## 2020-04-16 PROCEDURE — 83036 HEMOGLOBIN GLYCOSYLATED A1C: CPT | Mod: GA

## 2020-04-16 PROCEDURE — 85025 COMPLETE CBC W/AUTO DIFF WBC: CPT

## 2020-04-16 PROCEDURE — 84443 ASSAY THYROID STIM HORMONE: CPT

## 2020-04-16 PROCEDURE — 80053 COMPREHEN METABOLIC PANEL: CPT

## 2020-04-16 PROCEDURE — 80061 LIPID PANEL: CPT

## 2020-04-16 PROCEDURE — 36415 COLL VENOUS BLD VENIPUNCTURE: CPT | Mod: PO

## 2020-04-30 ENCOUNTER — OFFICE VISIT (OUTPATIENT)
Dept: NEUROLOGY | Facility: CLINIC | Age: 80
End: 2020-04-30
Payer: MEDICARE

## 2020-04-30 VITALS — HEIGHT: 62 IN | BODY MASS INDEX: 34.57 KG/M2

## 2020-04-30 DIAGNOSIS — I82.4Y9 DEEP VEIN THROMBOSIS (DVT) OF PROXIMAL LOWER EXTREMITY, UNSPECIFIED CHRONICITY, UNSPECIFIED LATERALITY: ICD-10-CM

## 2020-04-30 DIAGNOSIS — Z79.01: ICD-10-CM

## 2020-04-30 DIAGNOSIS — M85.88 OTHER SPECIFIED DISORDERS OF BONE DENSITY AND STRUCTURE, OTHER SITE: ICD-10-CM

## 2020-04-30 DIAGNOSIS — Z79.899 HIGH RISK MEDICATION USE: ICD-10-CM

## 2020-04-30 DIAGNOSIS — G40.909 SEIZURE DISORDER: Primary | ICD-10-CM

## 2020-04-30 PROCEDURE — 99214 OFFICE O/P EST MOD 30 MIN: CPT | Mod: 95,,, | Performed by: PSYCHIATRY & NEUROLOGY

## 2020-04-30 PROCEDURE — 99214 PR OFFICE/OUTPT VISIT, EST, LEVL IV, 30-39 MIN: ICD-10-PCS | Mod: 95,,, | Performed by: PSYCHIATRY & NEUROLOGY

## 2020-04-30 RX ORDER — ENOXAPARIN SODIUM 100 MG/ML
INJECTION SUBCUTANEOUS 2 TIMES DAILY
COMMUNITY
End: 2021-09-19

## 2020-04-30 RX ORDER — WARFARIN 7.5 MG/1
7.5 TABLET ORAL DAILY
COMMUNITY
End: 2021-02-05
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
## 2020-05-01 ENCOUNTER — LAB VISIT (OUTPATIENT)
Dept: LAB | Facility: HOSPITAL | Age: 80
End: 2020-05-01
Attending: INTERNAL MEDICINE
Payer: MEDICARE

## 2020-05-01 DIAGNOSIS — Z79.899 ENCOUNTER FOR LONG-TERM (CURRENT) USE OF OTHER MEDICATIONS: Primary | ICD-10-CM

## 2020-05-01 LAB — CARBAMAZEPINE SERPL-MCNC: 5.1 UG/ML (ref 4–12)

## 2020-05-01 PROCEDURE — 80156 ASSAY CARBAMAZEPINE TOTAL: CPT

## 2020-05-01 PROCEDURE — 36415 COLL VENOUS BLD VENIPUNCTURE: CPT | Mod: PO

## 2020-05-04 ENCOUNTER — PROCEDURE VISIT (OUTPATIENT)
Dept: OPHTHALMOLOGY | Facility: CLINIC | Age: 80
End: 2020-05-04
Payer: MEDICARE

## 2020-05-04 DIAGNOSIS — H35.3222 EXUDATIVE AGE-RELATED MACULAR DEGENERATION OF LEFT EYE WITH INACTIVE CHOROIDAL NEOVASCULARIZATION: ICD-10-CM

## 2020-05-04 DIAGNOSIS — H35.3211 EXUDATIVE AGE-RELATED MACULAR DEGENERATION OF RIGHT EYE WITH ACTIVE CHOROIDAL NEOVASCULARIZATION: Primary | ICD-10-CM

## 2020-05-04 PROCEDURE — 92014 COMPRE OPH EXAM EST PT 1/>: CPT | Mod: S$PBB,,, | Performed by: OPHTHALMOLOGY

## 2020-05-04 PROCEDURE — 67028 INJECTION EYE DRUG: CPT | Mod: PBBFAC,PO,RT | Performed by: OPHTHALMOLOGY

## 2020-05-04 PROCEDURE — 67028 PR INJECT INTRAVITREAL PHARMCOLOGIC: ICD-10-PCS | Mod: S$PBB,RT,, | Performed by: OPHTHALMOLOGY

## 2020-05-04 PROCEDURE — 92014 PR EYE EXAM, EST PATIENT,COMPREHESV: ICD-10-PCS | Mod: S$PBB,,, | Performed by: OPHTHALMOLOGY

## 2020-05-04 PROCEDURE — 67028 INJECTION EYE DRUG: CPT | Mod: S$PBB,RT,, | Performed by: OPHTHALMOLOGY

## 2020-05-04 RX ADMIN — AFLIBERCEPT 2 MG: 40 INJECTION, SOLUTION INTRAVITREAL at 10:05

## 2020-05-04 NOTE — PROGRESS NOTES
HPI     9 wk OCT   DLS-03/02/20 Dr. Sue    Pt sts still having difficulty seeing but unsure if it is worse   No other changes   Denies pain   (-)Flashes (-)Floaters  (-)Photophobia  (-)Glare    No gtts     HPI     9-10 wk OCT   DLS- 12/30/22019 Dr. Sue     Pt sts vision about the same still hard to see near.   Denies pain   (-)Flashes (-)Floaters  (-)Photophobia  (-)Glare    No gtts       OCT - OD  PED's with SRF - improving  OS - SR Fibrosis - NO SRF      A/P    1. Wet AMD OS  S/p Avastin injections outside  Stable today with cicatrix  Observe    2. Wet AMD OD  S/p Avastin OD x 15  S/p Eylea OD x 12    Eylea OD    Alternate tx      AREDS/AG    3. DM  No significant   BS/BP/chol control    4. PCIOL OU      9-10 weeks Avastin only      Risks, benefits, and alternatives to treatment discussed in detail with the patient.  The patient voiced understanding and wished to proceed with the procedure    Injection Procedure Note:  Diagnosis: Wet AMD OD    Patient Identified and Time Out complete  Topical Proparacaine and Betadine.  Inject Eylea OD at 6:00 @ 3.5-4mm posterior to limbus  Post Operative Dx: Same  Complications: None  Follow up as above.

## 2020-05-04 NOTE — PATIENT INSTRUCTIONS

## 2020-05-28 ENCOUNTER — LAB VISIT (OUTPATIENT)
Dept: LAB | Facility: HOSPITAL | Age: 80
End: 2020-05-28
Attending: INTERNAL MEDICINE
Payer: MEDICARE

## 2020-05-28 DIAGNOSIS — I82.401 DEEP VEIN THROMBOSIS OF RIGHT LOWER LIMB: Primary | ICD-10-CM

## 2020-05-28 LAB
INR PPP: 1.2 (ref 0.8–1.2)
PROTHROMBIN TIME: 12.5 SEC (ref 9–12.5)

## 2020-05-28 PROCEDURE — 85610 PROTHROMBIN TIME: CPT | Mod: PO

## 2020-05-28 PROCEDURE — 36415 COLL VENOUS BLD VENIPUNCTURE: CPT | Mod: PO

## 2020-06-08 ENCOUNTER — OFFICE VISIT (OUTPATIENT)
Dept: PODIATRY | Facility: CLINIC | Age: 80
End: 2020-06-08
Payer: MEDICARE

## 2020-06-08 VITALS — BODY MASS INDEX: 34.77 KG/M2 | WEIGHT: 188.94 LBS | HEIGHT: 62 IN

## 2020-06-08 DIAGNOSIS — L84 CORN OR CALLUS: ICD-10-CM

## 2020-06-08 DIAGNOSIS — B35.1 ONYCHOMYCOSIS DUE TO DERMATOPHYTE: ICD-10-CM

## 2020-06-08 DIAGNOSIS — M20.41 HAMMER TOES OF BOTH FEET: ICD-10-CM

## 2020-06-08 DIAGNOSIS — E11.42 DIABETIC POLYNEUROPATHY ASSOCIATED WITH TYPE 2 DIABETES MELLITUS: Primary | ICD-10-CM

## 2020-06-08 DIAGNOSIS — M20.42 HAMMER TOES OF BOTH FEET: ICD-10-CM

## 2020-06-08 PROCEDURE — 11721 PR DEBRIDEMENT OF NAILS, 6 OR MORE: ICD-10-PCS | Mod: S$PBB,59,Q9, | Performed by: PODIATRIST

## 2020-06-08 PROCEDURE — 99212 OFFICE O/P EST SF 10 MIN: CPT | Mod: PBBFAC,PN,25 | Performed by: PODIATRIST

## 2020-06-08 PROCEDURE — 11721 DEBRIDE NAIL 6 OR MORE: CPT | Mod: PBBFAC,PN | Performed by: PODIATRIST

## 2020-06-08 PROCEDURE — 11057 PARNG/CUTG B9 HYPRKR LES >4: CPT | Mod: PBBFAC,PN | Performed by: PODIATRIST

## 2020-06-08 PROCEDURE — 99999 PR PBB SHADOW E&M-EST. PATIENT-LVL II: ICD-10-PCS | Mod: PBBFAC,,, | Performed by: PODIATRIST

## 2020-06-08 PROCEDURE — 99499 NO LOS: ICD-10-PCS | Mod: S$PBB,,, | Performed by: PODIATRIST

## 2020-06-08 PROCEDURE — 99999 PR PBB SHADOW E&M-EST. PATIENT-LVL II: CPT | Mod: PBBFAC,,, | Performed by: PODIATRIST

## 2020-06-08 PROCEDURE — 11721 DEBRIDE NAIL 6 OR MORE: CPT | Mod: S$PBB,59,Q9, | Performed by: PODIATRIST

## 2020-06-08 PROCEDURE — 99499 UNLISTED E&M SERVICE: CPT | Mod: S$PBB,,, | Performed by: PODIATRIST

## 2020-06-08 PROCEDURE — 11057 PARNG/CUTG B9 HYPRKR LES >4: CPT | Mod: S$PBB,Q9,, | Performed by: PODIATRIST

## 2020-06-08 PROCEDURE — 11057 PR TRIM BENIGN HYPERKERATOTIC SKIN LESION,>4: ICD-10-PCS | Mod: S$PBB,Q9,, | Performed by: PODIATRIST

## 2020-06-08 NOTE — PROGRESS NOTES
Patient ID: Rebeca Turner is a 79 y.o. female.    Chief Complaint: Diabetes Mellitus (Johan 5/20) and Diabetic Foot Exam    Rebeca is a 79 y.o. female who presents to the clinic for evaluation and treatment of diabetic feet. Rebeca has a past medical history of Anemia of chronic disease, CKD (chronic kidney disease) stage 3, GFR 30-59 ml/min, Colon polyp, DM type 2 (diabetes mellitus, type 2), DVT (deep venous thrombosis), Dysplastic nevi, Fatty liver, GERD (gastroesophageal reflux disease), H/O esophagogastroduodenoscopy, History of endometrial cancer, HTN (hypertension), Hyperlipidemia, LVE (left ventricular enlargement), Macular degeneration, MR (mental retardation), Obesity, ULISANA (obstructive sleep apnea), Osteoporosis, S/P colonoscopy, SBO (small bowel obstruction), Seizure disorder, and Vitreous detachment. Patient relates no major problem with feet. Only complaints today consist of toenails and calluses in need of trimming.  Denies being painful with wearing shoe gear.  Continues to apply moisturizer to areas of callus build up daily.  Continues with tight control over her blood glucose.  Denies any additional pedal complaints.    PCP: JUSTICE rPado MD    Date Last Seen by PCP: 2/20    Hemoglobin A1C   Date Value Ref Range Status   04/15/2020 6.5 (H) 4.0 - 5.6 % Final     Comment:     ADA Screening Guidelines:  5.7-6.4%  Consistent with prediabetes  >or=6.5%  Consistent with diabetes  High levels of fetal hemoglobin interfere with the HbA1C  assay. Heterozygous hemoglobin variants (HbS, HgC, etc)do  not significantly interfere with this assay.   However, presence of multiple variants may affect accuracy.     11/08/2019 6.8 (H) 4.0 - 5.6 % Final     Comment:     ADA Screening Guidelines:  5.7-6.4%  Consistent with prediabetes  >or=6.5%  Consistent with diabetes  High levels of fetal hemoglobin interfere with the HbA1C  assay. Heterozygous hemoglobin variants (HbS, HgC, etc)do  not significantly interfere  with this assay.   However, presence of multiple variants may affect accuracy.     06/05/2019 7.0 (H) 4.0 - 5.6 % Final     Comment:     ADA Screening Guidelines:  5.7-6.4%  Consistent with prediabetes  >or=6.5%  Consistent with diabetes  High levels of fetal hemoglobin interfere with the HbA1C  assay. Heterozygous hemoglobin variants (HbS, HgC, etc)do  not significantly interfere with this assay.   However, presence of multiple variants may affect accuracy.             Past Medical History:   Diagnosis Date    Anemia of chronic disease     hematology su neg    CKD (chronic kidney disease) stage 3, GFR 30-59 ml/min     Colon polyp     DM type 2 (diabetes mellitus, type 2)     DVT (deep venous thrombosis)     x 2 both post-op with last 6/16    Dysplastic nevi     h/o    Fatty liver     noted on usg 9/15    GERD (gastroesophageal reflux disease)     H/O esophagogastroduodenoscopy     normal 11/10    History of endometrial cancer     stage 1;  s/p SENDY with BSO 10/06    HTN (hypertension)     Hyperlipidemia     LVE (left ventricular enlargement)     noted on 6/15 ECHO    Macular degeneration     exudative    MR (mental retardation)     Obesity     LUISANA (obstructive sleep apnea)     su in progress, uses bipap    Osteoporosis     osteopenia noted on 2/13 and 7/15 dexa    S/P colonoscopy     last 10/15;  next 10/19    SBO (small bowel obstruction)     h/o recurrent SBO;  s/p R hemicolectomy 12/10 and s/p incarcerated hernia repair with extensive scar tissue 6/16    Seizure disorder     Vitreous detachment        Past Surgical History:   Procedure Laterality Date    ABDOMINAL SURGERY  06/2016    Incarcerated incisional hernia, enterotomy x3, and extensive adhesions      CATARACT EXTRACTION      B 6/11    CHOLECYSTECTOMY      COLON SURGERY  12/6/2010  ([facility].)    R hemicolectomy 12/10 due to recurrent SBO from benign colon mass 12/2010.   Benign albeit large polyp.    COLONOSCOPY N/A 10/7/2015     Dr. Cr; polyps removed; diverticulosis; internal hemorrhoids; repeat in 4-5 years    COLONOSCOPY N/A 1/9/2020    Procedure: COLONOSCOPY;  Surgeon: Will Cr Jr., MD;  Location: [facility];  Service: Endoscopy;  Laterality: N/A;    COLONOSCOPY W/ POLYPECTOMY  11/22/2010  [facility].    3 cm growth mid ascending colon.  TUBULOVILLOUS ADENOMA. 9 mm polyp mid transverse colon.  TUBULAR ADENOMA.  10 mm polyp distal descending colon.  TUBULOVILLOUS ADENOMA.     ESOPHAGOGASTRODUODENOSCOPY  11/16/2010    Normal EGD.    HERNIA REPAIR  8/2015    from incarcerated hernia    HYSTERECTOMY  10/17/2006    SENDY and BSO 10/06 for stage 1 endometrial cancer    MYRINGOTOMY W/ TUBES      bilateral    TONSILLECTOMY         Family History   Problem Relation Age of Onset    Cancer Sister         details unknown    Heart attack Father     Pulmonary embolism Mother        Social History     Socioeconomic History    Marital status: Single     Spouse name: Not on file    Number of children: Not on file    Years of education: Not on file    Highest education level: Not on file   Occupational History    Not on file   Social Needs    Financial resource strain: Not on file    Food insecurity:     Worry: Not on file     Inability: Not on file    Transportation needs:     Medical: Not on file     Non-medical: Not on file   Tobacco Use    Smoking status: Never Smoker    Smokeless tobacco: Never Used   Substance and Sexual Activity    Alcohol use: No     Alcohol/week: 0.0 standard drinks    Drug use: No    Sexual activity: Never   Lifestyle    Physical activity:     Days per week: Not on file     Minutes per session: Not on file    Stress: Not on file   Relationships    Social connections:     Talks on phone: Not on file     Gets together: Not on file     Attends Anabaptist service: Not on file     Active member of club or organization: Not on file     Attends meetings of clubs or organizations: Not on file      Relationship status: Not on file   Other Topics Concern    Not on file   Social History Narrative    Lives at [facility].       Current Outpatient Prescriptions   Medication Sig Dispense Refill    alendronate (FOSAMAX) 70 MG tablet TAKE ONE TABLET WEEKLY AS DIRECTED ON THURSDAY 4 tablet 0    calcium carbonate-vitamin D3 (CALCIUM 600 + D,3,) 600 mg calcium- 200 unit Cap Take 1 capsule by mouth 2 (two) times daily.      CRESTOR 10 mg tablet TAKE ONE TABLET BY MOUTH EVERY NIGHT AT BEDTIME 30 tablet 0    diltiaZEM (CARDIZEM CD) 360 MG 24 hr capsule       dimethicone-kelvin-A-C-E-aloe (GOLD BOND ULTIMATE DIABETICS') 3-30 % Crea Apply topically 2 (two) times daily. APPLY TO FEET      docusate sodium (COLACE) 100 MG capsule Take 100 mg by mouth 2 (two) times daily.      fish oil-omega-3 fatty acids 300-1,000 mg capsule Take 2 g by mouth once daily.      hydrALAZINE (APRESOLINE) 50 MG tablet Take 50 mg by mouth every 12 (twelve) hours.      hydroCHLOROthiazide (HYDRODIURIL) 25 MG tablet       iron-vitamin C 100-250 mg, ICAR-C, 100-250 mg Tab Take one tablet in the morning with an acidic drink before breakfast. 90 tablet 0    labetalol (NORMODYNE) 300 MG tablet TAKE ONE TABLET BY MOUTH TWICE DAILY FOR BLOOD PRESSURE 60 tablet 0    LANTUS SOLOSTAR 100 unit/mL (3 mL) InPn pen Inject 35 Units into the skin every evening.       lisinopril (PRINIVIL,ZESTRIL) 20 MG tablet       lutein 20 mg Cap TAKE ONE SOFTGEL BY MOUTH EVERY MORNING 30 each 0    multivitamin capsule Take 1 capsule by mouth once daily.      polyethylene glycol (GLYCOLAX) 17 gram/dose powder       TEGRETOL  mg 12 hr tablet TAKE ONE TABLET BY MOUTH ONCE DAILY SEIZURES (Patient taking differently: one tablet bid) 30 tablet 0     No current facility-administered medications for this visit.        Review of patient's allergies indicates:  No Known Allergies      Review of Systems   Constitution: Negative for chills and fever.   Cardiovascular: Negative  for claudication and leg swelling.   Skin: Positive for color change, nail changes and suspicious lesions.   Musculoskeletal: Negative for arthritis, joint pain and joint swelling.   Gastrointestinal: Negative for nausea and vomiting.   Neurological: Positive for numbness. Negative for paresthesias.   Psychiatric/Behavioral: Negative for altered mental status.           Objective:      Physical Exam   Constitutional: She is oriented to person, place, and time. She appears well-developed and well-nourished. No distress.   Cardiovascular:   Pulses:       Dorsalis pedis pulses are 2+ on the right side, and 2+ on the left side.        Posterior tibial pulses are 1+ on the right side, and 1+ on the left side.   CFT <3 seconds bilateral.  Pedal hair growth decreased bilateral.  Varicosities noted to bilateral lower extremity.  Mild nonpitting edema noted to bilateral lower extremity.  Toes are cool to touch bilateral.     Musculoskeletal: She exhibits edema. She exhibits no tenderness.   Muscle strength 5/5 in all muscle groups bilateral.  No tenderness nor crepitation with ROM of foot/ankle joints bilateral.  No tenderness with palpation of bilateral foot and ankle.  Bilateral pes planus foot type.  Bilateral semi-rigid contracture of toes 2-5.  Bilateral forefoot fat pad atrophy.   Neurological: She is alert and oriented to person, place, and time. She has normal strength. A sensory deficit is present.   Protective sensation per [place] monofilament decreased bilateral.    Vibratory sensation decreased bilateral.    Light touch intact bilateral.   Skin: Skin is warm, dry and intact. Capillary refill takes less than 2 seconds. Lesion noted. No abrasion, no bruising, no burn, no ecchymosis, no laceration, no petechiae and no rash noted. She is not diaphoretic. No cyanosis or erythema. No pallor. Nails show no clubbing.   Pedal skin appears thin and atrophic bilateral.  Toenails x 10 appear thickened by 4 mm's,  elongated by 4 mm's, and discolored with subungual debris.  Hyperkeratotic lesion noted to bilateral 3rd and 4th toes at the distal tip as well as to the Lt. 3rd and 4th sub met heads.  Multiple nevi noted to the dorsum of bilateral foot and lower leg.  No break noted in the adjacent skin integrity.              Assessment:       Encounter Diagnoses   Name Primary?    Diabetic polyneuropathy associated with type 2 diabetes mellitus Yes    Hammer toes of both feet     Corn or callus     Onychomycosis due to dermatophyte          Plan:       Rebeca was seen today for diabetes mellitus and diabetic foot exam.    Diagnoses and all orders for this visit:    Diabetic polyneuropathy associated with type 2 diabetes mellitus    Hammer toes of both feet    Corn or callus    Onychomycosis due to dermatophyte      I counseled the patient on her conditions, their implications and medical management.    Advised to continue wearing shoe gear that accommodates for digital deformities.    Shoe inspection. Diabetic Foot Education. Patient reminded of the importance of good nutrition and blood sugar control to help prevent podiatric complications of diabetes. Patient instructed on proper foot hygeine. We discussed wearing proper shoe gear, daily foot inspections, never walking without protective shoe gear, never putting sharp instruments to feet    With patient's permission, nails were aggressively reduced and debrided x 10 to their soft tissue attachment mechanically and with electric , removing all offending nail and debris.  Also, a sterile #15 scalpel was used to trim lesions x 6 down to smooth appearing skin without incident.  Patient relates relief following the procedure. She will continue to monitor the areas daily, inspect her feet, wear protective shoe gear when ambulatory, moisturizer to maintain skin integrity and follow in this office in approximately 2-3 months, sooner p.r.n.    Follow up in about 3 months  (around 9/8/2020).    Sae Snowden DPM

## 2020-06-24 ENCOUNTER — LAB VISIT (OUTPATIENT)
Dept: LAB | Facility: HOSPITAL | Age: 80
End: 2020-06-24
Attending: INTERNAL MEDICINE
Payer: MEDICARE

## 2020-06-24 DIAGNOSIS — I82.401 RIGHT LEG DVT: Primary | ICD-10-CM

## 2020-06-24 LAB
INR PPP: 0.9 (ref 0.8–1.2)
PROTHROMBIN TIME: 9.8 SEC (ref 9–12.5)

## 2020-06-24 PROCEDURE — 85610 PROTHROMBIN TIME: CPT | Mod: PO

## 2020-06-24 PROCEDURE — 36415 COLL VENOUS BLD VENIPUNCTURE: CPT | Mod: PO

## 2020-07-06 ENCOUNTER — PROCEDURE VISIT (OUTPATIENT)
Dept: OPHTHALMOLOGY | Facility: CLINIC | Age: 80
End: 2020-07-06
Payer: MEDICARE

## 2020-07-06 VITALS — SYSTOLIC BLOOD PRESSURE: 161 MMHG | HEART RATE: 58 BPM | DIASTOLIC BLOOD PRESSURE: 55 MMHG

## 2020-07-06 DIAGNOSIS — H35.3211 EXUDATIVE AGE-RELATED MACULAR DEGENERATION OF RIGHT EYE WITH ACTIVE CHOROIDAL NEOVASCULARIZATION: Primary | ICD-10-CM

## 2020-07-06 PROCEDURE — 67028 PR INJECT INTRAVITREAL PHARMCOLOGIC: ICD-10-PCS | Mod: S$PBB,RT,, | Performed by: OPHTHALMOLOGY

## 2020-07-06 PROCEDURE — 67028 INJECTION EYE DRUG: CPT | Mod: S$PBB,RT,, | Performed by: OPHTHALMOLOGY

## 2020-07-06 PROCEDURE — 92134 CPTRZ OPH DX IMG PST SGM RTA: CPT | Mod: PBBFAC,PO | Performed by: OPHTHALMOLOGY

## 2020-07-06 PROCEDURE — 92012 PR EYE EXAM, EST PATIENT,INTERMED: ICD-10-PCS | Mod: 25,S$PBB,, | Performed by: OPHTHALMOLOGY

## 2020-07-06 PROCEDURE — 67028 INJECTION EYE DRUG: CPT | Mod: PBBFAC,PO | Performed by: OPHTHALMOLOGY

## 2020-07-06 PROCEDURE — 92134 POSTERIOR SEGMENT OCT RETINA (OCULAR COHERENCE TOMOGRAPHY)-BOTH EYES: ICD-10-PCS | Mod: 26,S$PBB,, | Performed by: OPHTHALMOLOGY

## 2020-07-06 PROCEDURE — 92012 INTRM OPH EXAM EST PATIENT: CPT | Mod: 25,S$PBB,, | Performed by: OPHTHALMOLOGY

## 2020-07-06 RX ADMIN — Medication 1.25 MG: at 10:07

## 2020-07-06 NOTE — PROGRESS NOTES
HPI     9 wk Avastin   DLS-05/04/20 Dr. Sue    Pt sts no changes problems or complaints at this time   Denies pain   (-)Flashes (-)Floaters  (-)Photophobia  (-)Glare    No gtts           OCT - OD  PED's with SRF - improving  OS - SR Fibrosis - NO SRF      A/P    1. Wet AMD OS  S/p Avastin injections outside  Stable today with cicatrix  Observe    2. Wet AMD OD  S/p Avastin OD x 15  S/p Eylea OD x 13    Avastin OD    Alternate tx      AREDS/AG    3. DM  No significant DR  BS/BP/chol control    4. PCIOL OU      9-10 weeks Oct and dilate      Risks, benefits, and alternatives to treatment discussed in detail with the patient.  The patient voiced understanding and wished to proceed with the procedure    Injection Procedure Note:  Diagnosis: Wet AMD OD    Patient Identified and Time Out complete  Topical Proparacaine and Betadine.  Inject Avastin OD at 6:00 @ 3.5-4mm posterior to limbus  Post Operative Dx: Same  Complications: None  Follow up as above.

## 2020-07-06 NOTE — PATIENT INSTRUCTIONS
POST INTRAVITREAL INJECTION PATIENT INSTRUCTIONS   Below are some guidelines for what to expect after your treatment and additional care instructions.   * you may experience mild discomfort in your eye after the treatment. Your eye usually feels better within 24 to 48 hours after the procedure.   * you have been given drops that numb the surface of your eye.   DO NOT rub or touch your eye, DO NOT wear contacts until numbness goes away.   * you may experience small spots that appear in your field of vision, these are usually caused by an air bubble or from the medication. It taked a few hours or days for these to go away.   * use of sunglasses will help reduce light sensitivity and glare.   * DO NOT swim   for at least 3 hours after the injection   * If you have a gritty, burning, irritating or stinging feeling in the injected eye. This may be a result of the antiseptic used. Use artifical tears or eye lubricant ( from over- the- counter from your local pharmacy ). If you have some at home already please check the expiration date, so not to use anything contaminated in your eye. A cool pack over your eye brow above the injected eye may also relieve discomfort.   Call us right away or go to the Emergency Department if you have a dramatic decrease in vision or extreme pain in the eye.   [facility] [phone]

## 2020-07-22 ENCOUNTER — PES CALL (OUTPATIENT)
Dept: ADMINISTRATIVE | Facility: CLINIC | Age: 80
End: 2020-07-22

## 2020-08-18 ENCOUNTER — TELEPHONE (OUTPATIENT)
Dept: NEUROLOGY | Facility: CLINIC | Age: 80
End: 2020-08-18

## 2020-08-18 ENCOUNTER — PATIENT MESSAGE (OUTPATIENT)
Dept: NEUROLOGY | Facility: CLINIC | Age: 80
End: 2020-08-18

## 2020-08-18 NOTE — TELEPHONE ENCOUNTER
Results reviewed with nurse at [facility] by phone. Copy forwarded by fax to [phone].

## 2020-08-18 NOTE — TELEPHONE ENCOUNTER
Pt advised on lab results  per Dr. Lema, White blood cell count, sodium level, and carbamazepine level are normal. You are slightly anemic (stable when compared to prior labs), glucose is high, and alkaline phosphatase is slightly high (also has been in the past).

## 2020-08-24 ENCOUNTER — LAB VISIT (OUTPATIENT)
Dept: LAB | Facility: HOSPITAL | Age: 80
End: 2020-08-24
Attending: NURSE PRACTITIONER
Payer: MEDICARE

## 2020-08-24 ENCOUNTER — OFFICE VISIT (OUTPATIENT)
Dept: HEMATOLOGY/ONCOLOGY | Facility: CLINIC | Age: 80
End: 2020-08-24
Payer: MEDICARE

## 2020-08-24 VITALS
OXYGEN SATURATION: 97 % | RESPIRATION RATE: 20 BRPM | SYSTOLIC BLOOD PRESSURE: 171 MMHG | TEMPERATURE: 98 F | WEIGHT: 178.81 LBS | DIASTOLIC BLOOD PRESSURE: 57 MMHG | HEART RATE: 62 BPM | BODY MASS INDEX: 32.7 KG/M2

## 2020-08-24 DIAGNOSIS — D50.9 IRON DEFICIENCY ANEMIA, UNSPECIFIED IRON DEFICIENCY ANEMIA TYPE: Primary | ICD-10-CM

## 2020-08-24 DIAGNOSIS — D50.9 IRON DEFICIENCY ANEMIA, UNSPECIFIED IRON DEFICIENCY ANEMIA TYPE: ICD-10-CM

## 2020-08-24 LAB
ALBUMIN SERPL BCP-MCNC: 4.1 G/DL (ref 3.5–5.2)
ALP SERPL-CCNC: 132 U/L (ref 38–145)
ALT SERPL W/O P-5'-P-CCNC: 20 U/L (ref 0–35)
ANION GAP SERPL CALC-SCNC: 3 MMOL/L (ref 8–16)
AST SERPL-CCNC: 25 U/L (ref 14–36)
BASOPHILS # BLD AUTO: 0.02 K/UL (ref 0–0.2)
BASOPHILS NFR BLD: 0.3 % (ref 0–1.9)
BILIRUB SERPL-MCNC: 0.2 MG/DL (ref 0.2–1.3)
BUN SERPL-MCNC: 30 MG/DL (ref 7–18)
CALCIUM SERPL-MCNC: 9.4 MG/DL (ref 8.4–10.2)
CHLORIDE SERPL-SCNC: 103 MMOL/L (ref 95–110)
CO2 SERPL-SCNC: 35 MMOL/L (ref 22–31)
CREAT SERPL-MCNC: 0.96 MG/DL (ref 0.5–1.4)
DIFFERENTIAL METHOD: ABNORMAL
EOSINOPHIL # BLD AUTO: 0.1 K/UL (ref 0–0.5)
EOSINOPHIL NFR BLD: 2.1 % (ref 0–8)
ERYTHROCYTE [DISTWIDTH] IN BLOOD BY AUTOMATED COUNT: 13.1 % (ref 11.5–14.5)
EST. GFR  (AFRICAN AMERICAN): >60 ML/MIN/1.73 M^2
EST. GFR  (NON AFRICAN AMERICAN): 56 ML/MIN/1.73 M^2
FERRITIN SERPL-MCNC: 36 NG/ML (ref 12–264)
GLUCOSE SERPL-MCNC: 68 MG/DL (ref 70–110)
HCT VFR BLD AUTO: 34.9 % (ref 37–48.5)
HGB BLD-MCNC: 10.8 G/DL (ref 12–16)
IMM GRANULOCYTES # BLD AUTO: 0 K/UL (ref 0–0.04)
IMM GRANULOCYTES NFR BLD AUTO: 0 % (ref 0–0.5)
IRON SATN MFR SERPL: 19 % (ref 20–50)
IRON SERPL-MCNC: 62 UG/DL (ref 30–160)
LYMPHOCYTES # BLD AUTO: 0.9 K/UL (ref 1–4.8)
LYMPHOCYTES NFR BLD: 14.2 % (ref 18–48)
MCH RBC QN AUTO: 29.3 PG (ref 27–31)
MCHC RBC AUTO-ENTMCNC: 30.9 G/DL (ref 32–36)
MCV RBC AUTO: 95 FL (ref 82–98)
MONOCYTES # BLD AUTO: 0.6 K/UL (ref 0.3–1)
MONOCYTES NFR BLD: 9.9 % (ref 4–15)
NEUTROPHILS # BLD AUTO: 4.5 K/UL (ref 1.8–7.7)
NEUTROPHILS NFR BLD: 73.5 % (ref 38–73)
NRBC BLD-RTO: 0 /100 WBC
PLATELET # BLD AUTO: 216 K/UL (ref 150–350)
PMV BLD AUTO: 9.9 FL (ref 9.2–12.9)
POTASSIUM SERPL-SCNC: 4.1 MMOL/L (ref 3.5–5.1)
PROT SERPL-MCNC: 6.9 G/DL (ref 6–8.4)
RBC # BLD AUTO: 3.69 M/UL (ref 4–5.4)
SODIUM SERPL-SCNC: 141 MMOL/L (ref 136–145)
TOTAL IRON BINDING CAPACITY: 329 UG/DL (ref 265–497)
WBC # BLD AUTO: 6.14 K/UL (ref 3.9–12.7)

## 2020-08-24 PROCEDURE — 82728 ASSAY OF FERRITIN: CPT

## 2020-08-24 PROCEDURE — 99215 OFFICE O/P EST HI 40 MIN: CPT | Mod: PBBFAC,PN | Performed by: NURSE PRACTITIONER

## 2020-08-24 PROCEDURE — 80053 COMPREHEN METABOLIC PANEL: CPT

## 2020-08-24 PROCEDURE — 82728 ASSAY OF FERRITIN: CPT | Mod: PN

## 2020-08-24 PROCEDURE — 36415 COLL VENOUS BLD VENIPUNCTURE: CPT | Mod: PN

## 2020-08-24 PROCEDURE — 99999 PR PBB SHADOW E&M-EST. PATIENT-LVL V: CPT | Mod: PBBFAC,,, | Performed by: NURSE PRACTITIONER

## 2020-08-24 PROCEDURE — 83540 ASSAY OF IRON: CPT | Mod: PN

## 2020-08-24 PROCEDURE — 85025 COMPLETE CBC W/AUTO DIFF WBC: CPT

## 2020-08-24 PROCEDURE — 85025 COMPLETE CBC W/AUTO DIFF WBC: CPT | Mod: PN

## 2020-08-24 PROCEDURE — 99213 OFFICE O/P EST LOW 20 MIN: CPT | Mod: S$PBB,,, | Performed by: NURSE PRACTITIONER

## 2020-08-24 PROCEDURE — 83540 ASSAY OF IRON: CPT

## 2020-08-24 PROCEDURE — 99213 PR OFFICE/OUTPT VISIT, EST, LEVL III, 20-29 MIN: ICD-10-PCS | Mod: S$PBB,,, | Performed by: NURSE PRACTITIONER

## 2020-08-24 PROCEDURE — 99999 PR PBB SHADOW E&M-EST. PATIENT-LVL V: ICD-10-PCS | Mod: PBBFAC,,, | Performed by: NURSE PRACTITIONER

## 2020-08-24 PROCEDURE — 80053 COMPREHEN METABOLIC PANEL: CPT | Mod: PN

## 2020-08-24 RX ORDER — IRON,CARBONYL/ASCORBIC ACID 100-250 MG
TABLET ORAL
Qty: 90 TABLET | Refills: 2 | Status: SHIPPED | OUTPATIENT
Start: 2020-08-24 | End: 2021-02-05 | Stop reason: SDUPTHER

## 2020-08-24 NOTE — PROGRESS NOTES
HISTORY OF PRESENT ILLNESS:  This is a 80-year-old mentally challenged white female known to Dr. Voss for a diagnosis regarding hypochromic normocytic anemia in association with elevated alkaline phosphatase levels.  She tried a course of ICAR-C followed by MVI with iron.     She presents to the clinic today with her sitter for interval evaluation as she resides in a group home in [facility].  She is upbeat and talkative.  Recent diagnosis of DVT, presently on Coumadin.  She continues to walk a lot daily. 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  She denies any fatigue, weakness, headaches, blurred vision, melena, pica, abdominal discomfort, nausea, vomiting, constipation, diarrhea, fevers, chills, etc.  No other new complaints or pertinent findings on a 14-point review of systems.     PHYSICAL EXAMINATION:  GENERAL:  Well-developed, well-nourished elderly white female, mentally challenged, in no acute distress.  Alert and oriented to person and place.  VITAL SIGNS:  Weight:  Gain of 1 pound in 6 months  Wt Readings from Last 3 Encounters:   08/24/20 81.1 kg (178 lb 12.7 oz)   06/08/20 85.7 kg (188 lb 15 oz)   04/21/20 85.7 kg (189 lb)          Temp Readings from Last 3 Encounters:   08/24/20 97.7 °F (36.5 °C) (Temporal)   04/21/20 98.4 °F (36.9 °C)   02/24/20 98.1 °F (36.7 °C)          BP Readings from Last 3 Encounters:   08/24/20 (!) 171/57   07/06/20 (!) 161/55   04/21/20 (!) 164/64          Pulse Readings from Last 3 Encounters:   08/24/20 62   07/06/20 (!) 58   04/21/20 60      HEENT:  Normocephalic, atraumatic.  Oral mucosa pink and moist.  Lips without lesions.  Tongue midline.  Oropharynx clear.  Nonicteric sclerae. Erythematous nares with secretions.  NECK:  Supple, no adenopathy.  No carotid bruits, thyromegaly or thyroid nodule.  HEART:  Regular rate and rhythm without murmur, gallop or rub.   LUNGS:  Clear to auscultation.  Normal respiratory effort.   ABDOMEN:  Soft,  nontender, nondistended with positive normoactive bowel sounds, no hepatosplenomegaly.  EXTREMITIES:  No cyanosis, clubbing or edema.  Distal pulses are intact.      LABORATORY:         Lab Results   Component Value Date     WBC 6.14 08/24/2020     RBC 3.69 (L) 08/24/2020     HGB 10.8 (L) 08/24/2020     HCT 34.9 (L) 08/24/2020     MCV 95 08/24/2020     MCH 29.3 08/24/2020     MCHC 30.9 (L) 08/24/2020     RDW 13.1 08/24/2020      08/24/2020     MPV 9.9 08/24/2020     GRAN 4.5 08/24/2020     GRAN 73.5 (H) 08/24/2020     LYMPH 0.9 (L) 08/24/2020     LYMPH 14.2 (L) 08/24/2020     MONO 0.6 08/24/2020     MONO 9.9 08/24/2020     EOS 0.1 08/24/2020     BASO 0.02 08/24/2020     EOSINOPHIL 2.1 08/24/2020     BASOPHIL 0.3 08/24/2020            Lab Results   Component Value Date     IRON 62 08/24/2020     TIBC 329 08/24/2020     FERRITIN 36 08/24/2020      Saturated iron:  19     CMP        Sodium   Date Value Ref Range Status   08/24/2020 141 136 - 145 mmol/L Final            Potassium   Date Value Ref Range Status   08/24/2020 4.1 3.5 - 5.1 mmol/L Final            Chloride   Date Value Ref Range Status   08/24/2020 103 95 - 110 mmol/L Final            CO2   Date Value Ref Range Status   08/24/2020 35 (H) 22 - 31 mmol/L Final              Glucose   Date Value Ref Range Status   08/24/2020 68 (L) 70 - 110 mg/dL Final       Comment:       The ADA recommends the following guidelines for fasting glucose:  Normal:       less than 100 mg/dL  Prediabetes:  100 mg/dL to 125 mg/dL  Diabetes:     126 mg/dL or higher               BUN, Bld   Date Value Ref Range Status   08/24/2020 30 (H) 7 - 18 mg/dL Final            Creatinine   Date Value Ref Range Status   08/24/2020 0.96 0.50 - 1.40 mg/dL Final            Calcium   Date Value Ref Range Status   08/24/2020 9.4 8.4 - 10.2 mg/dL Final            Total Protein   Date Value Ref Range Status   08/24/2020 6.9 6.0 - 8.4 g/dL Final            Albumin   Date Value Ref Range Status    08/24/2020 4.1 3.5 - 5.2 g/dL Final            Total Bilirubin   Date Value Ref Range Status   08/24/2020 0.2 0.2 - 1.3 mg/dL Final            Alkaline Phosphatase   Date Value Ref Range Status   08/24/2020 132 38 - 145 U/L Final            AST   Date Value Ref Range Status   08/24/2020 25 14 - 36 U/L Final            ALT   Date Value Ref Range Status   08/24/2020 20 0 - 35 U/L Final            Anion Gap   Date Value Ref Range Status   08/24/2020 3 (L) 8 - 16 mmol/L Final            eGFR if    Date Value Ref Range Status   08/24/2020 >60 >60 mL/min/1.73 m^2 Final              eGFR if non    Date Value Ref Range Status   08/24/2020 56 (A) >60 mL/min/1.73 m^2 Final       Comment:       Calculation used to obtain the estimated glomerular filtration  rate (eGFR) is the CKD-EPI equation.                Lab Results   Component Value Date     HGBA1C 6.5 (H) 04/15/2020      Colonoscopy:  01/09/2020:  Impression:                      - Two 2 to 4 mm polyps in the proximal transverse                        colon, removed with a cold snare. Resected and                        retrieved.                       - One 1 to 2 mm polyp in the cecum, removed with a                        cold biopsy forceps. Resected and retrieved.                       - Diverticulosis in the sigmoid colon and in the                        descending colon.                       - Non-bleeding internal hemorrhoids.                       - Patent functional end-to-end ileo-colonic                        anastomosis, characterized by healthy appearing                        mucosa.                       - The examination was otherwise normal.                       - The examined portion of the ileum was normal.     Pathology:  Benign     IMPRESSION:  1.  Anemia of chronic disease:  Stable, asymptomatic  2.  CKD III.  3.  Fatty infiltration of the liver.  4.  Elevated alk phos - stable.  5.  HTN - follow with  primary  6.  H/O Vincent HAGEN - follows with Dr. Hung  7.  DVT - on Coumadin     PLAN:    1.  ICar-C (1) daily.  Rx sent to pharmacy.  2.  Reevaluate in 3 months with CBC, CMP, Iron studies/ferritin prior.        Assessment/plan reviewed and approved by Dr. Voss.

## 2020-09-08 ENCOUNTER — LAB VISIT (OUTPATIENT)
Dept: LAB | Facility: OTHER | Age: 80
End: 2020-09-08
Attending: INTERNAL MEDICINE
Payer: MEDICARE

## 2020-09-08 DIAGNOSIS — Z03.818 ENCOUNTER FOR OBSERVATION FOR SUSPECTED EXPOSURE TO OTHER BIOLOGICAL AGENTS RULED OUT: ICD-10-CM

## 2020-09-08 PROCEDURE — U0003 INFECTIOUS AGENT DETECTION BY NUCLEIC ACID (DNA OR RNA); SEVERE ACUTE RESPIRATORY SYNDROME CORONAVIRUS 2 (SARS-COV-2) (CORONAVIRUS DISEASE [COVID-19]), AMPLIFIED PROBE TECHNIQUE, MAKING USE OF HIGH THROUGHPUT TECHNOLOGIES AS DESCRIBED BY CMS-2020-01-R: HCPCS

## 2020-09-09 LAB — SARS-COV-2 RNA RESP QL NAA+PROBE: NOT DETECTED

## 2020-09-14 ENCOUNTER — PROCEDURE VISIT (OUTPATIENT)
Dept: OPHTHALMOLOGY | Facility: CLINIC | Age: 80
End: 2020-09-14
Payer: MEDICARE

## 2020-09-14 DIAGNOSIS — H43.393 VITREOUS FLOATERS OF BOTH EYES: ICD-10-CM

## 2020-09-14 DIAGNOSIS — H35.3211 EXUDATIVE AGE-RELATED MACULAR DEGENERATION OF RIGHT EYE WITH ACTIVE CHOROIDAL NEOVASCULARIZATION: Primary | ICD-10-CM

## 2020-09-14 PROCEDURE — 92014 PR EYE EXAM, EST PATIENT,COMPREHESV: ICD-10-PCS | Mod: 25,S$PBB,, | Performed by: OPHTHALMOLOGY

## 2020-09-14 PROCEDURE — 67028 INJECTION EYE DRUG: CPT | Mod: PBBFAC,PO,RT | Performed by: OPHTHALMOLOGY

## 2020-09-14 PROCEDURE — 67028 INJECTION EYE DRUG: CPT | Mod: S$PBB,RT,, | Performed by: OPHTHALMOLOGY

## 2020-09-14 PROCEDURE — 67028 PR INJECT INTRAVITREAL PHARMCOLOGIC: ICD-10-PCS | Mod: S$PBB,RT,, | Performed by: OPHTHALMOLOGY

## 2020-09-14 PROCEDURE — 92134 POSTERIOR SEGMENT OCT RETINA (OCULAR COHERENCE TOMOGRAPHY)-BOTH EYES: ICD-10-PCS | Mod: 26,S$PBB,, | Performed by: OPHTHALMOLOGY

## 2020-09-14 PROCEDURE — 92134 CPTRZ OPH DX IMG PST SGM RTA: CPT | Mod: PBBFAC,PO | Performed by: OPHTHALMOLOGY

## 2020-09-14 PROCEDURE — 92014 COMPRE OPH EXAM EST PT 1/>: CPT | Mod: 25,S$PBB,, | Performed by: OPHTHALMOLOGY

## 2020-09-14 RX ORDER — AMMONIUM LACTATE 12 G/100G
LOTION TOPICAL 2 TIMES DAILY PRN
COMMUNITY
Start: 2020-07-10

## 2020-09-14 RX ORDER — DILTIAZEM HYDROCHLORIDE 360 MG/1
CAPSULE, EXTENDED RELEASE ORAL
COMMUNITY
Start: 2020-08-18 | End: 2021-02-05

## 2020-09-14 RX ADMIN — AFLIBERCEPT 2 MG: 40 INJECTION, SOLUTION INTRAVITREAL at 10:09

## 2020-09-14 NOTE — PATIENT INSTRUCTIONS

## 2020-09-14 NOTE — PROGRESS NOTES
HPI     dls 7/6/2020 DR KRAMER  OCT, FULL EXAM TODAY      No gtts       OCT - OD  PED's with SRF - improving  OS - SR Fibrosis - NO SRF      A/P    1. Wet AMD OS  S/p Avastin injections outside  Stable today with cicatrix  Observe    2. Wet AMD OD  S/p Avastin OD x 16  S/p Eylea OD x 13    Eylea OD    Keep eylea monotherapy until stable, the can resume alternating tx      AREDS/AG    3. DM  No significant DR  BS/BP/chol control    4. PCIOL OU      7-8 weeks Oct no dilate  DFE today      Risks, benefits, and alternatives to treatment discussed in detail with the patient.  The patient voiced understanding and wished to proceed with the procedure    Injection Procedure Note:  Diagnosis: Wet AMD OD    Patient Identified and Time Out complete  Topical Proparacaine and Betadine.  Inject Eylea OD at 6:00 @ 3.5-4mm posterior to limbus  Post Operative Dx: Same  Complications: None  Follow up as above.

## 2020-09-15 ENCOUNTER — OFFICE VISIT (OUTPATIENT)
Dept: PODIATRY | Facility: CLINIC | Age: 80
End: 2020-09-15
Payer: MEDICARE

## 2020-09-15 DIAGNOSIS — M20.42 HAMMER TOES OF BOTH FEET: ICD-10-CM

## 2020-09-15 DIAGNOSIS — M20.41 HAMMER TOES OF BOTH FEET: ICD-10-CM

## 2020-09-15 DIAGNOSIS — E11.42 DIABETIC POLYNEUROPATHY ASSOCIATED WITH TYPE 2 DIABETES MELLITUS: Primary | ICD-10-CM

## 2020-09-15 DIAGNOSIS — L84 CORN OR CALLUS: ICD-10-CM

## 2020-09-15 DIAGNOSIS — B35.1 ONYCHOMYCOSIS DUE TO DERMATOPHYTE: ICD-10-CM

## 2020-09-15 PROCEDURE — 11721 PR DEBRIDEMENT OF NAILS, 6 OR MORE: ICD-10-PCS | Mod: Q9,59,S$PBB, | Performed by: PODIATRIST

## 2020-09-15 PROCEDURE — 11057 PARNG/CUTG B9 HYPRKR LES >4: CPT | Mod: Q9,S$PBB,, | Performed by: PODIATRIST

## 2020-09-15 PROCEDURE — 99499 NO LOS: ICD-10-PCS | Mod: S$PBB,,, | Performed by: PODIATRIST

## 2020-09-15 PROCEDURE — 11057 PARNG/CUTG B9 HYPRKR LES >4: CPT | Mod: PBBFAC,PN | Performed by: PODIATRIST

## 2020-09-15 PROCEDURE — 11721 DEBRIDE NAIL 6 OR MORE: CPT | Mod: Q9,59,S$PBB, | Performed by: PODIATRIST

## 2020-09-15 PROCEDURE — 99499 UNLISTED E&M SERVICE: CPT | Mod: S$PBB,,, | Performed by: PODIATRIST

## 2020-09-15 PROCEDURE — 11721 DEBRIDE NAIL 6 OR MORE: CPT | Mod: PBBFAC,PN | Performed by: PODIATRIST

## 2020-09-15 PROCEDURE — 11057 PR TRIM BENIGN HYPERKERATOTIC SKIN LESION,>4: ICD-10-PCS | Mod: Q9,S$PBB,, | Performed by: PODIATRIST

## 2020-09-15 NOTE — PROGRESS NOTES
Patient ID: Rebeca Turner is a 80 y.o. female.    Chief Complaint: No chief complaint on file.    Rebeca is a 80 y.o. female who presents to the clinic for evaluation and treatment of diabetic feet. Rebeca has a past medical history of Anemia of chronic disease, CKD (chronic kidney disease) stage 3, GFR 30-59 ml/min, Colon polyp, DM type 2 (diabetes mellitus, type 2), DVT (deep venous thrombosis), Dysplastic nevi, Fatty liver, GERD (gastroesophageal reflux disease), H/O esophagogastroduodenoscopy, History of endometrial cancer, HTN (hypertension), Hyperlipidemia, LVE (left ventricular enlargement), Macular degeneration, MR (mental retardation), Obesity, LUISANA (obstructive sleep apnea), Osteoporosis, S/P colonoscopy, SBO (small bowel obstruction), Seizure disorder, and Vitreous detachment. Patient relates no major problem with feet. Only complaints today consist of toenails and calluses in need of trimming.  Denies being painful with wearing shoe gear.  Continues to apply moisturizer to areas of callus build up daily.  Denies neuropathy pain with today's exam.  Denies any additional pedal complaints.    PCP: JUSTICE Prado MD    Date Last Seen by PCP: 2/20    Hemoglobin A1C   Date Value Ref Range Status   04/15/2020 6.5 (H) 4.0 - 5.6 % Final     Comment:     ADA Screening Guidelines:  5.7-6.4%  Consistent with prediabetes  >or=6.5%  Consistent with diabetes  High levels of fetal hemoglobin interfere with the HbA1C  assay. Heterozygous hemoglobin variants (HbS, HgC, etc)do  not significantly interfere with this assay.   However, presence of multiple variants may affect accuracy.     11/08/2019 6.8 (H) 4.0 - 5.6 % Final     Comment:     ADA Screening Guidelines:  5.7-6.4%  Consistent with prediabetes  >or=6.5%  Consistent with diabetes  High levels of fetal hemoglobin interfere with the HbA1C  assay. Heterozygous hemoglobin variants (HbS, HgC, etc)do  not significantly interfere with this assay.   However, presence of  multiple variants may affect accuracy.     06/05/2019 7.0 (H) 4.0 - 5.6 % Final     Comment:     ADA Screening Guidelines:  5.7-6.4%  Consistent with prediabetes  >or=6.5%  Consistent with diabetes  High levels of fetal hemoglobin interfere with the HbA1C  assay. Heterozygous hemoglobin variants (HbS, HgC, etc)do  not significantly interfere with this assay.   However, presence of multiple variants may affect accuracy.             Past Medical History:   Diagnosis Date    Anemia of chronic disease     hematology su neg    CKD (chronic kidney disease) stage 3, GFR 30-59 ml/min     Colon polyp     DM type 2 (diabetes mellitus, type 2)     DVT (deep venous thrombosis)     x 2 both post-op with last 6/16    Dysplastic nevi     h/o    Fatty liver     noted on usg 9/15    GERD (gastroesophageal reflux disease)     H/O esophagogastroduodenoscopy     normal 11/10    History of endometrial cancer     stage 1;  s/p SENDY with BSO 10/06    HTN (hypertension)     Hyperlipidemia     LVE (left ventricular enlargement)     noted on 6/15 ECHO    Macular degeneration     exudative    MR (mental retardation)     Obesity     LUISANA (obstructive sleep apnea)     su in progress, uses bipap    Osteoporosis     osteopenia noted on 2/13 and 7/15 dexa    S/P colonoscopy     last 10/15;  next 10/19    SBO (small bowel obstruction)     h/o recurrent SBO;  s/p R hemicolectomy 12/10 and s/p incarcerated hernia repair with extensive scar tissue 6/16    Seizure disorder     Vitreous detachment        Past Surgical History:   Procedure Laterality Date    ABDOMINAL SURGERY  06/2016    Incarcerated incisional hernia, enterotomy x3, and extensive adhesions      CATARACT EXTRACTION      B 6/11    CHOLECYSTECTOMY      COLON SURGERY  12/6/2010  ([facility].)    R hemicolectomy 12/10 due to recurrent SBO from benign colon mass 12/2010.   Benign albeit large polyp.    COLONOSCOPY N/A 10/7/2015    Dr. Cr; polyps removed;  diverticulosis; internal hemorrhoids; repeat in 4-5 years    COLONOSCOPY N/A 1/9/2020    Procedure: COLONOSCOPY;  Surgeon: Will Cr Jr., MD;  Location: [facility];  Service: Endoscopy;  Laterality: N/A;    COLONOSCOPY W/ POLYPECTOMY  11/22/2010  [facility].    3 cm growth mid ascending colon.  TUBULOVILLOUS ADENOMA. 9 mm polyp mid transverse colon.  TUBULAR ADENOMA.  10 mm polyp distal descending colon.  TUBULOVILLOUS ADENOMA.     ESOPHAGOGASTRODUODENOSCOPY  11/16/2010    Normal EGD.    HERNIA REPAIR  8/2015    from incarcerated hernia    HYSTERECTOMY  10/17/2006    SENDY and BSO 10/06 for stage 1 endometrial cancer    MYRINGOTOMY W/ TUBES      bilateral    TONSILLECTOMY         Family History   Problem Relation Age of Onset    Cancer Sister         details unknown    Heart attack Father     Pulmonary embolism Mother        Social History     Socioeconomic History    Marital status: Single     Spouse name: Not on file    Number of children: Not on file    Years of education: Not on file    Highest education level: Not on file   Occupational History    Not on file   Social Needs    Financial resource strain: Not on file    Food insecurity     Worry: Not on file     Inability: Not on file    Transportation needs     Medical: Not on file     Non-medical: Not on file   Tobacco Use    Smoking status: Never Smoker    Smokeless tobacco: Never Used   Substance and Sexual Activity    Alcohol use: No     Alcohol/week: 0.0 standard drinks    Drug use: No    Sexual activity: Never   Lifestyle    Physical activity     Days per week: Not on file     Minutes per session: Not on file    Stress: Not on file   Relationships    Social connections     Talks on phone: Not on file     Gets together: Not on file     Attends Oriental orthodox service: Not on file     Active member of club or organization: Not on file     Attends meetings of clubs or organizations: Not on file     Relationship status: Not on file    Other Topics Concern    Not on file   Social History Narrative    Lives at [facility].       Current Outpatient Prescriptions   Medication Sig Dispense Refill    alendronate (FOSAMAX) 70 MG tablet TAKE ONE TABLET WEEKLY AS DIRECTED ON THURSDAY 4 tablet 0    calcium carbonate-vitamin D3 (CALCIUM 600 + D,3,) 600 mg calcium- 200 unit Cap Take 1 capsule by mouth 2 (two) times daily.      CRESTOR 10 mg tablet TAKE ONE TABLET BY MOUTH EVERY NIGHT AT BEDTIME 30 tablet 0    diltiaZEM (CARDIZEM CD) 360 MG 24 hr capsule       dimethicone-kelvin-A-C-E-aloe (GOLD BOND ULTIMATE DIABETICS') 3-30 % Crea Apply topically 2 (two) times daily. APPLY TO FEET      docusate sodium (COLACE) 100 MG capsule Take 100 mg by mouth 2 (two) times daily.      fish oil-omega-3 fatty acids 300-1,000 mg capsule Take 2 g by mouth once daily.      hydrALAZINE (APRESOLINE) 50 MG tablet Take 50 mg by mouth every 12 (twelve) hours.      hydroCHLOROthiazide (HYDRODIURIL) 25 MG tablet       iron-vitamin C 100-250 mg, ICAR-C, 100-250 mg Tab Take one tablet in the morning with an acidic drink before breakfast. 90 tablet 0    labetalol (NORMODYNE) 300 MG tablet TAKE ONE TABLET BY MOUTH TWICE DAILY FOR BLOOD PRESSURE 60 tablet 0    LANTUS SOLOSTAR 100 unit/mL (3 mL) InPn pen Inject 35 Units into the skin every evening.       lisinopril (PRINIVIL,ZESTRIL) 20 MG tablet       lutein 20 mg Cap TAKE ONE SOFTGEL BY MOUTH EVERY MORNING 30 each 0    multivitamin capsule Take 1 capsule by mouth once daily.      polyethylene glycol (GLYCOLAX) 17 gram/dose powder       TEGRETOL  mg 12 hr tablet TAKE ONE TABLET BY MOUTH ONCE DAILY SEIZURES (Patient taking differently: one tablet bid) 30 tablet 0     No current facility-administered medications for this visit.        Review of patient's allergies indicates:  No Known Allergies      Review of Systems   Constitution: Negative for chills and fever.   Cardiovascular: Negative for claudication and leg  swelling.   Skin: Positive for color change, nail changes and suspicious lesions.   Musculoskeletal: Negative for arthritis, joint pain and joint swelling.   Gastrointestinal: Negative for nausea and vomiting.   Neurological: Positive for numbness. Negative for paresthesias.   Psychiatric/Behavioral: Negative for altered mental status.           Objective:      Physical Exam  Constitutional:       General: She is not in acute distress.     Appearance: She is well-developed. She is not diaphoretic.   Cardiovascular:      Pulses:           Dorsalis pedis pulses are 2+ on the right side and 2+ on the left side.        Posterior tibial pulses are 1+ on the right side and 1+ on the left side.      Comments: CFT < 3 seconds bilateral.  Pedal hair growth decreased bilateral.  Varicosities noted to bilateral lower extremity.  Moderate nonpitting edema noted to bilateral lower extremity.  Toes are cool to touch bilateral.    Musculoskeletal:         General: No tenderness.      Comments: Muscle strength 5/5 in all muscle groups bilateral.  No tenderness nor crepitation with ROM of foot/ankle joints bilateral.  No tenderness with palpation of bilateral foot and ankle.  Bilateral pes planus foot type.  Bilateral semi-rigid contracture of toes 2-5.  Bilateral forefoot fat pad atrophy.   Skin:     General: Skin is warm and dry.      Capillary Refill: Capillary refill takes less than 2 seconds.      Coloration: Skin is not pale.      Findings: Lesion present. No abrasion, bruising, burn, ecchymosis, erythema, laceration, petechiae or rash.      Nails: There is no clubbing.        Comments: Pedal skin appears thin and atrophic bilateral.  Toenails x 10 appear thickened by 4 mm's, elongated by 4 mm's, and discolored with subungual debris.  Hyperkeratotic lesion noted to bilateral 3rd and 4th toes at the distal tip as well as to the Lt. 3rd and 4th sub met heads.  Multiple nevi noted to the dorsum of bilateral foot and lower leg.   No break noted in the adjacent skin integrity.    Neurological:      Mental Status: She is alert and oriented to person, place, and time.      Sensory: Sensory deficit present.      Comments: Protective sensation per [place] monofilament decreased bilateral.    Vibratory sensation decreased bilateral.    Light touch intact bilateral.               Assessment:       Encounter Diagnoses   Name Primary?    Diabetic polyneuropathy associated with type 2 diabetes mellitus Yes    Hammer toes of both feet     Corn or callus     Onychomycosis due to dermatophyte          Plan:       Diagnoses and all orders for this visit:    Diabetic polyneuropathy associated with type 2 diabetes mellitus    Hammer toes of both feet    Corn or callus    Onychomycosis due to dermatophyte      I counseled the patient on her conditions, their implications and medical management.    Advised to continue wearing shoe gear that accommodates for digital deformities.    Shoe inspection. Diabetic Foot Education. Patient reminded of the importance of good nutrition and blood sugar control to help prevent podiatric complications of diabetes. Patient instructed on proper foot hygeine. We discussed wearing proper shoe gear, daily foot inspections, never walking without protective shoe gear, never putting sharp instruments to feet    With patient's permission, nails were aggressively reduced and debrided x 10 to their soft tissue attachment mechanically and with electric , removing all offending nail and debris.  Also, a sterile #15 scalpel was used to trim lesions x 6 down to smooth appearing skin without incident.  Patient relates relief following the procedure. She will continue to monitor the areas daily, inspect her feet, wear protective shoe gear when ambulatory, moisturizer to maintain skin integrity and follow in this office in approximately 2-3 months, sooner p.r.n.    Follow up in about 3 months (around  12/15/2020).    Sae Snowden DPM

## 2020-09-21 ENCOUNTER — TELEPHONE (OUTPATIENT)
Dept: OPHTHALMOLOGY | Facility: CLINIC | Age: 80
End: 2020-09-21

## 2020-09-21 NOTE — TELEPHONE ENCOUNTER
----- Message from Ramona Johnson sent at 9/21/2020 12:14 PM CDT -----  Contact: Gricel DORMAN  Type:  Patient Requesting Call    Who Called: Gricel Pickard Call Back Number: [phone]  Additional Information:  Please call Gricel to reschedule pt's procedure for later on that day or schedule for another day

## 2020-11-09 ENCOUNTER — PROCEDURE VISIT (OUTPATIENT)
Dept: OPHTHALMOLOGY | Facility: CLINIC | Age: 80
End: 2020-11-09
Payer: MEDICARE

## 2020-11-09 DIAGNOSIS — H35.3211 EXUDATIVE AGE-RELATED MACULAR DEGENERATION OF RIGHT EYE WITH ACTIVE CHOROIDAL NEOVASCULARIZATION: Primary | ICD-10-CM

## 2020-11-09 PROCEDURE — 67028 PR INJECT INTRAVITREAL PHARMCOLOGIC: ICD-10-PCS | Mod: S$PBB,RT,, | Performed by: OPHTHALMOLOGY

## 2020-11-09 PROCEDURE — 67028 INJECTION EYE DRUG: CPT | Mod: S$PBB,RT,, | Performed by: OPHTHALMOLOGY

## 2020-11-09 PROCEDURE — 92134 CPTRZ OPH DX IMG PST SGM RTA: CPT | Mod: PBBFAC,PO | Performed by: OPHTHALMOLOGY

## 2020-11-09 PROCEDURE — 92012 PR EYE EXAM, EST PATIENT,INTERMED: ICD-10-PCS | Mod: 25,S$PBB,, | Performed by: OPHTHALMOLOGY

## 2020-11-09 PROCEDURE — 67028 INJECTION EYE DRUG: CPT | Mod: PBBFAC,PO,RT | Performed by: OPHTHALMOLOGY

## 2020-11-09 PROCEDURE — 92012 INTRM OPH EXAM EST PATIENT: CPT | Mod: 25,S$PBB,, | Performed by: OPHTHALMOLOGY

## 2020-11-09 PROCEDURE — 92134 POSTERIOR SEGMENT OCT RETINA (OCULAR COHERENCE TOMOGRAPHY)-BOTH EYES: ICD-10-PCS | Mod: 26,S$PBB,, | Performed by: OPHTHALMOLOGY

## 2020-11-09 RX ADMIN — AFLIBERCEPT 2 MG: 40 INJECTION, SOLUTION INTRAVITREAL at 02:11

## 2020-11-09 NOTE — PROGRESS NOTES
HPI     7-8 wk OCT   DLS- 09/14/20 Dr. Sue    Pt sts sometimes she can not see the little tiny words and far away.   Denies pain   (-)Flashes (-)Floaters  (-)Photophobia  (-)Glare      OCT - OD  PED's with SRF - improving  OS - SR Fibrosis - NO SRF      A/P    1. Wet AMD OS  S/p Avastin injections outside  Stable today with cicatrix  Observe    2. Wet AMD OD  S/p Avastin OD x 16  S/p Eylea OD x 14    Eylea OD    Keep eylea monotherapy until stable, the can resume alternating tx      AREDS/AG    3. DM  No significant DR  BS/BP/chol control    4. PCIOL OU      7-8 weeks Oct and dilate OU        Risks, benefits, and alternatives to treatment discussed in detail with the patient.  The patient voiced understanding and wished to proceed with the procedure    Injection Procedure Note:  Diagnosis: Wet AMD OD    Patient Identified and Time Out complete  Topical Proparacaine and Betadine.  Inject Eylea OD at 6:00 @ 3.5-4mm posterior to limbus  Post Operative Dx: Same  Complications: None  Follow up as above.

## 2020-11-09 NOTE — PATIENT INSTRUCTIONS

## 2020-11-18 ENCOUNTER — CLINICAL SUPPORT (OUTPATIENT)
Dept: AUDIOLOGY | Facility: CLINIC | Age: 80
End: 2020-11-18
Payer: MEDICARE

## 2020-11-18 ENCOUNTER — OFFICE VISIT (OUTPATIENT)
Dept: OTOLARYNGOLOGY | Facility: CLINIC | Age: 80
End: 2020-11-18
Payer: MEDICARE

## 2020-11-18 VITALS — HEIGHT: 62 IN | BODY MASS INDEX: 32.91 KG/M2 | WEIGHT: 178.81 LBS

## 2020-11-18 DIAGNOSIS — F79 INTELLECTUAL DISABILITY: ICD-10-CM

## 2020-11-18 DIAGNOSIS — Z97.4 WEARS HEARING AID IN BOTH EARS: ICD-10-CM

## 2020-11-18 DIAGNOSIS — H90.3 BILATERAL SENSORINEURAL HEARING LOSS: ICD-10-CM

## 2020-11-18 DIAGNOSIS — H93.293 IMPAIRED AUDITORY DISCRIMINATION, BILATERAL: Primary | ICD-10-CM

## 2020-11-18 DIAGNOSIS — H90.3 BILATERAL SENSORINEURAL HEARING LOSS: Primary | ICD-10-CM

## 2020-11-18 DIAGNOSIS — H93.293 IMPAIRED AUDITORY DISCRIMINATION, BILATERAL: ICD-10-CM

## 2020-11-18 PROCEDURE — 92552 PURE TONE AUDIOMETRY AIR: CPT | Mod: PBBFAC,PO | Performed by: AUDIOLOGIST

## 2020-11-18 PROCEDURE — 99999 PR PBB SHADOW E&M-EST. PATIENT-LVL I: ICD-10-PCS | Mod: PBBFAC,,,

## 2020-11-18 PROCEDURE — 99211 OFF/OP EST MAY X REQ PHY/QHP: CPT | Mod: PBBFAC,27,PO,25

## 2020-11-18 PROCEDURE — 92556 SPEECH AUDIOMETRY COMPLETE: CPT | Mod: PBBFAC,PO | Performed by: AUDIOLOGIST

## 2020-11-18 PROCEDURE — 99999 PR PBB SHADOW E&M-EST. PATIENT-LVL IV: CPT | Mod: PBBFAC,,, | Performed by: NURSE PRACTITIONER

## 2020-11-18 PROCEDURE — 99213 PR OFFICE/OUTPT VISIT, EST, LEVL III, 20-29 MIN: ICD-10-PCS | Mod: S$PBB,,, | Performed by: NURSE PRACTITIONER

## 2020-11-18 PROCEDURE — 99214 OFFICE O/P EST MOD 30 MIN: CPT | Mod: PBBFAC,PO,25 | Performed by: NURSE PRACTITIONER

## 2020-11-18 PROCEDURE — 99213 OFFICE O/P EST LOW 20 MIN: CPT | Mod: S$PBB,,, | Performed by: NURSE PRACTITIONER

## 2020-11-18 PROCEDURE — 99999 PR PBB SHADOW E&M-EST. PATIENT-LVL I: CPT | Mod: PBBFAC,,,

## 2020-11-18 PROCEDURE — 99999 PR PBB SHADOW E&M-EST. PATIENT-LVL IV: ICD-10-PCS | Mod: PBBFAC,,, | Performed by: NURSE PRACTITIONER

## 2020-11-18 RX ORDER — INFLUENZA A VIRUS A/MICHIGAN/45/2015 X-275 (H1N1) ANTIGEN (FORMALDEHYDE INACTIVATED), INFLUENZA A VIRUS A/SINGAPORE/INFIMH-16-0019/2016 IVR-186 (H3N2) ANTIGEN (FORMALDEHYDE INACTIVATED), INFLUENZA B VIRUS B/PHUKET/3073/2013 ANTIGEN (FORMALDEHYDE INACTIVATED), AND INFLUENZA B VIRUS B/MARYLAND/15/2016 BX-69A ANTIGEN (FORMALDEHYDE INACTIVATED) 60; 60; 60; 60 UG/.7ML; UG/.7ML; UG/.7ML; UG/.7ML
INJECTION, SUSPENSION INTRAMUSCULAR
COMMUNITY
Start: 2020-09-29 | End: 2021-08-18 | Stop reason: ALTCHOICE

## 2020-11-18 NOTE — PROGRESS NOTES
Rebeca Turner was seen 11/18/20 for an audiological evaluation.     Pt's aide reported that her hearing amplifier is broken so they will need to get her a new one.     Audiogram results revealed a severe-to-profound sensorineural hearing loss bilaterally.  Speech Reception Thresholds were  70 dBHL for the right ear and 70 dBHL for the left ear.    Word recognition scores were poor for the right ear and poor for the left ear.       Audiogram results were reviewed in detail with patient and all questions were answered. Results will be reviewed by ENT at the completion of this note.     Recommend binaural amplification pending medical clearance, hearing protection for all loud sounds and repeat audiogram in 2-3 years to monitor hearing loss.

## 2020-11-18 NOTE — PROGRESS NOTES
Subjective:       Patient ID: Rebeca Turner is a 80 y.o. female.    Chief Complaint: Cerumen Impaction    HPI   Patient resides at [facility] and returns today for her annual audiogram. She has worn bilateral hearing aids but she is not wearing them because they are currently broken. She has h/o moderately-severe to profound predominantly sensorineural hearing loss. She denies current ENT symptoms or complaints.     Review of Systems   Constitutional: Negative.    HENT: Positive for hearing loss.    Eyes: Negative.    Respiratory: Negative.    Cardiovascular: Negative.    Gastrointestinal: Negative for abdominal pain, nausea and vomiting.   Musculoskeletal: Negative.    Skin: Negative.    Neurological: Negative.    Psychiatric/Behavioral: Negative.        Objective:      Physical Exam  Vitals signs and nursing note reviewed.   Constitutional:       General: She is not in acute distress.     Appearance: She is well-developed. She is not ill-appearing or diaphoretic.   HENT:      Head: Normocephalic and atraumatic.      Right Ear: Hearing, tympanic membrane, ear canal and external ear normal. No middle ear effusion. Tympanic membrane is not erythematous.      Left Ear: Hearing, tympanic membrane, ear canal and external ear normal.  No middle ear effusion. Tympanic membrane is not erythematous.      Nose: Nose normal.   Eyes:      General: Lids are normal. No scleral icterus.        Right eye: No discharge.         Left eye: No discharge.   Neck:      Musculoskeletal: Normal range of motion.      Trachea: Trachea normal. No tracheal deviation.   Cardiovascular:      Rate and Rhythm: Normal rate.   Pulmonary:      Effort: Pulmonary effort is normal. No respiratory distress.      Breath sounds: No stridor. No wheezing.   Musculoskeletal: Normal range of motion.      Comments: Ambulates with rolling walker   Skin:     General: Skin is warm and dry.      Coloration: Skin is not pale.      Findings: No  lesion or rash.   Neurological:      Mental Status: She is alert.      Coordination: Coordination normal.      Gait: Gait normal.   Psychiatric:         Speech: Speech normal.         Behavior: Behavior normal. Behavior is cooperative.         Thought Content: Thought content normal.         Judgment: Judgment normal.       Assessment:         Severe to profound sensorineural hearing loss with poor speech discrimination AU  Plan:         Recommend continued daily use of binaural amplification.  Recommend repeat audiogram in 2-3 years.   Recommend hearing protection in loud noise.   Return to clinic as needed for further ENT symptoms or concerns.

## 2020-12-08 ENCOUNTER — LAB VISIT (OUTPATIENT)
Dept: LAB | Facility: HOSPITAL | Age: 80
End: 2020-12-08
Attending: INTERNAL MEDICINE
Payer: MEDICARE

## 2020-12-08 DIAGNOSIS — I10 ESSENTIAL HYPERTENSION, MALIGNANT: Primary | ICD-10-CM

## 2020-12-08 DIAGNOSIS — G40.89 SOMATOSENSORY ATTACKS: ICD-10-CM

## 2020-12-08 DIAGNOSIS — E78.5 HYPERLIPEMIA: ICD-10-CM

## 2020-12-08 DIAGNOSIS — E11.9 DIABETES MELLITUS WITHOUT COMPLICATION: ICD-10-CM

## 2020-12-08 LAB
ALBUMIN SERPL BCP-MCNC: 3.2 G/DL (ref 3.5–5.2)
ALP SERPL-CCNC: 187 U/L (ref 55–135)
ALT SERPL W/O P-5'-P-CCNC: 16 U/L (ref 10–44)
ANION GAP SERPL CALC-SCNC: 9 MMOL/L (ref 8–16)
AST SERPL-CCNC: 14 U/L (ref 10–40)
BASOPHILS # BLD AUTO: 0.03 K/UL (ref 0–0.2)
BASOPHILS NFR BLD: 0.5 % (ref 0–1.9)
BILIRUB SERPL-MCNC: 0.2 MG/DL (ref 0.1–1)
BUN SERPL-MCNC: 33 MG/DL (ref 8–23)
CALCIUM SERPL-MCNC: 8.9 MG/DL (ref 8.7–10.5)
CARBAMAZEPINE SERPL-MCNC: 5 UG/ML (ref 4–12)
CHLORIDE SERPL-SCNC: 102 MMOL/L (ref 95–110)
CHOLEST SERPL-MCNC: 114 MG/DL (ref 120–199)
CHOLEST/HDLC SERPL: 2.9 {RATIO} (ref 2–5)
CO2 SERPL-SCNC: 30 MMOL/L (ref 23–29)
CREAT SERPL-MCNC: 1 MG/DL (ref 0.5–1.4)
DIFFERENTIAL METHOD: ABNORMAL
EOSINOPHIL # BLD AUTO: 0.2 K/UL (ref 0–0.5)
EOSINOPHIL NFR BLD: 2.6 % (ref 0–8)
ERYTHROCYTE [DISTWIDTH] IN BLOOD BY AUTOMATED COUNT: 13.3 % (ref 11.5–14.5)
EST. GFR  (AFRICAN AMERICAN): >60 ML/MIN/1.73 M^2
EST. GFR  (NON AFRICAN AMERICAN): 53.3 ML/MIN/1.73 M^2
GLUCOSE SERPL-MCNC: 159 MG/DL (ref 70–110)
HCT VFR BLD AUTO: 31.3 % (ref 37–48.5)
HDLC SERPL-MCNC: 39 MG/DL (ref 40–75)
HDLC SERPL: 34.2 % (ref 20–50)
HGB BLD-MCNC: 9.3 G/DL (ref 12–16)
IMM GRANULOCYTES # BLD AUTO: 0.02 K/UL (ref 0–0.04)
IMM GRANULOCYTES NFR BLD AUTO: 0.3 % (ref 0–0.5)
INR PPP: 1.4 (ref 0.8–1.2)
LDLC SERPL CALC-MCNC: 37 MG/DL (ref 63–159)
LYMPHOCYTES # BLD AUTO: 0.8 K/UL (ref 1–4.8)
LYMPHOCYTES NFR BLD: 12.8 % (ref 18–48)
MCH RBC QN AUTO: 29.2 PG (ref 27–31)
MCHC RBC AUTO-ENTMCNC: 29.7 G/DL (ref 32–36)
MCV RBC AUTO: 98 FL (ref 82–98)
MONOCYTES # BLD AUTO: 0.6 K/UL (ref 0.3–1)
MONOCYTES NFR BLD: 9.9 % (ref 4–15)
NEUTROPHILS # BLD AUTO: 4.6 K/UL (ref 1.8–7.7)
NEUTROPHILS NFR BLD: 73.9 % (ref 38–73)
NONHDLC SERPL-MCNC: 75 MG/DL
NRBC BLD-RTO: 0 /100 WBC
PLATELET # BLD AUTO: 229 K/UL (ref 150–350)
PMV BLD AUTO: 10.7 FL (ref 9.2–12.9)
POTASSIUM SERPL-SCNC: 4.3 MMOL/L (ref 3.5–5.1)
PROT SERPL-MCNC: 6.5 G/DL (ref 6–8.4)
PROTHROMBIN TIME: 14.7 SEC (ref 9–12.5)
RBC # BLD AUTO: 3.18 M/UL (ref 4–5.4)
SODIUM SERPL-SCNC: 141 MMOL/L (ref 136–145)
TRIGL SERPL-MCNC: 190 MG/DL (ref 30–150)
TSH SERPL DL<=0.005 MIU/L-ACNC: 2.1 UIU/ML (ref 0.4–4)
WBC # BLD AUTO: 6.25 K/UL (ref 3.9–12.7)

## 2020-12-08 PROCEDURE — 80156 ASSAY CARBAMAZEPINE TOTAL: CPT

## 2020-12-08 PROCEDURE — 80053 COMPREHEN METABOLIC PANEL: CPT

## 2020-12-08 PROCEDURE — 85610 PROTHROMBIN TIME: CPT | Mod: PO

## 2020-12-08 PROCEDURE — 36415 COLL VENOUS BLD VENIPUNCTURE: CPT | Mod: PO

## 2020-12-08 PROCEDURE — 85025 COMPLETE CBC W/AUTO DIFF WBC: CPT

## 2020-12-08 PROCEDURE — 84443 ASSAY THYROID STIM HORMONE: CPT

## 2020-12-08 PROCEDURE — 80061 LIPID PANEL: CPT

## 2020-12-08 PROCEDURE — 83036 HEMOGLOBIN GLYCOSYLATED A1C: CPT

## 2020-12-09 LAB
ESTIMATED AVG GLUCOSE: 134 MG/DL (ref 68–131)
HBA1C MFR BLD HPLC: 6.3 % (ref 4–5.6)

## 2020-12-29 ENCOUNTER — LAB VISIT (OUTPATIENT)
Dept: LAB | Facility: HOSPITAL | Age: 80
End: 2020-12-29
Attending: INTERNAL MEDICINE
Payer: MEDICARE

## 2020-12-29 DIAGNOSIS — R79.1 ABNORMAL COAGULATION PROFILE: Primary | ICD-10-CM

## 2020-12-29 LAB
INR PPP: 1.5 (ref 0.8–1.2)
PROTHROMBIN TIME: 16.1 SEC (ref 9–12.5)

## 2020-12-29 PROCEDURE — 36415 COLL VENOUS BLD VENIPUNCTURE: CPT | Mod: PO

## 2020-12-29 PROCEDURE — 85610 PROTHROMBIN TIME: CPT | Mod: PO

## 2021-01-28 ENCOUNTER — TELEPHONE (OUTPATIENT)
Dept: PODIATRY | Facility: CLINIC | Age: 81
End: 2021-01-28

## 2021-01-28 ENCOUNTER — TELEPHONE (OUTPATIENT)
Dept: OPHTHALMOLOGY | Facility: CLINIC | Age: 81
End: 2021-01-28

## 2021-01-28 ENCOUNTER — TELEPHONE (OUTPATIENT)
Dept: HEMATOLOGY/ONCOLOGY | Facility: CLINIC | Age: 81
End: 2021-01-28

## 2021-01-29 ENCOUNTER — TELEPHONE (OUTPATIENT)
Dept: HEMATOLOGY/ONCOLOGY | Facility: CLINIC | Age: 81
End: 2021-01-29

## 2021-02-05 ENCOUNTER — LAB VISIT (OUTPATIENT)
Dept: LAB | Facility: HOSPITAL | Age: 81
End: 2021-02-05
Attending: NURSE PRACTITIONER
Payer: MEDICARE

## 2021-02-05 ENCOUNTER — OFFICE VISIT (OUTPATIENT)
Dept: HEMATOLOGY/ONCOLOGY | Facility: CLINIC | Age: 81
End: 2021-02-05
Payer: MEDICARE

## 2021-02-05 VITALS
WEIGHT: 179.69 LBS | HEART RATE: 58 BPM | BODY MASS INDEX: 32.85 KG/M2 | DIASTOLIC BLOOD PRESSURE: 60 MMHG | SYSTOLIC BLOOD PRESSURE: 178 MMHG | TEMPERATURE: 98 F | OXYGEN SATURATION: 98 %

## 2021-02-05 DIAGNOSIS — I10 ESSENTIAL HYPERTENSION: ICD-10-CM

## 2021-02-05 DIAGNOSIS — E11.42 DIABETIC POLYNEUROPATHY ASSOCIATED WITH TYPE 2 DIABETES MELLITUS: ICD-10-CM

## 2021-02-05 DIAGNOSIS — N18.30 STAGE 3 CHRONIC KIDNEY DISEASE, UNSPECIFIED WHETHER STAGE 3A OR 3B CKD: ICD-10-CM

## 2021-02-05 DIAGNOSIS — D50.9 IRON DEFICIENCY ANEMIA, UNSPECIFIED IRON DEFICIENCY ANEMIA TYPE: Primary | ICD-10-CM

## 2021-02-05 DIAGNOSIS — D50.9 IRON DEFICIENCY ANEMIA, UNSPECIFIED IRON DEFICIENCY ANEMIA TYPE: ICD-10-CM

## 2021-02-05 LAB
ALBUMIN SERPL BCP-MCNC: 4.1 G/DL (ref 3.5–5.2)
ALP SERPL-CCNC: 163 U/L (ref 38–145)
ALT SERPL W/O P-5'-P-CCNC: 21 U/L (ref 0–35)
ANION GAP SERPL CALC-SCNC: 8 MMOL/L (ref 8–16)
AST SERPL-CCNC: 24 U/L (ref 14–36)
BASOPHILS # BLD AUTO: 0.04 K/UL (ref 0–0.2)
BASOPHILS NFR BLD: 0.6 % (ref 0–1.9)
BILIRUB SERPL-MCNC: 0.2 MG/DL (ref 0.2–1.3)
CALCIUM SERPL-MCNC: 9.8 MG/DL (ref 8.4–10.2)
CHLORIDE SERPL-SCNC: 101 MMOL/L (ref 95–110)
CO2 SERPL-SCNC: 35 MMOL/L (ref 22–31)
CREAT SERPL-MCNC: 0.93 MG/DL (ref 0.5–1.4)
DIFFERENTIAL METHOD: ABNORMAL
EOSINOPHIL # BLD AUTO: 0.2 K/UL (ref 0–0.5)
EOSINOPHIL NFR BLD: 2.8 % (ref 0–8)
ERYTHROCYTE [DISTWIDTH] IN BLOOD BY AUTOMATED COUNT: 13.2 % (ref 11.5–14.5)
EST. GFR  (AFRICAN AMERICAN): >60 ML/MIN/1.73 M^2
EST. GFR  (NON AFRICAN AMERICAN): 58 ML/MIN/1.73 M^2
FERRITIN SERPL-MCNC: 37 NG/ML (ref 12–264)
GLUCOSE SERPL-MCNC: 116 MG/DL (ref 70–110)
HCT VFR BLD AUTO: 33 % (ref 37–48.5)
HGB BLD-MCNC: 10.3 G/DL (ref 12–16)
IMM GRANULOCYTES # BLD AUTO: 0.01 K/UL (ref 0–0.04)
IMM GRANULOCYTES NFR BLD AUTO: 0.2 % (ref 0–0.5)
IRON SATN MFR SERPL: 21 % (ref 20–50)
IRON SERPL-MCNC: 70 UG/DL (ref 30–160)
LYMPHOCYTES # BLD AUTO: 0.8 K/UL (ref 1–4.8)
LYMPHOCYTES NFR BLD: 12.8 % (ref 18–48)
MCH RBC QN AUTO: 29.7 PG (ref 27–31)
MCHC RBC AUTO-ENTMCNC: 31.2 G/DL (ref 32–36)
MCV RBC AUTO: 95 FL (ref 82–98)
MONOCYTES # BLD AUTO: 0.7 K/UL (ref 0.3–1)
MONOCYTES NFR BLD: 10.9 % (ref 4–15)
NEUTROPHILS # BLD AUTO: 4.8 K/UL (ref 1.8–7.7)
NEUTROPHILS NFR BLD: 72.7 % (ref 38–73)
NRBC BLD-RTO: 0 /100 WBC
PLATELET # BLD AUTO: 226 K/UL (ref 150–350)
PMV BLD AUTO: 10.3 FL (ref 9.2–12.9)
POTASSIUM SERPL-SCNC: 4.4 MMOL/L (ref 3.5–5.1)
PROT SERPL-MCNC: 6.7 G/DL (ref 6–8.4)
RBC # BLD AUTO: 3.47 M/UL (ref 4–5.4)
SODIUM SERPL-SCNC: 144 MMOL/L (ref 136–145)
TOTAL IRON BINDING CAPACITY: 336 UG/DL (ref 265–497)
UUN UR-MCNC: 29 MG/DL (ref 7–18)
WBC # BLD AUTO: 6.54 K/UL (ref 3.9–12.7)

## 2021-02-05 PROCEDURE — 83540 ASSAY OF IRON: CPT | Mod: PN

## 2021-02-05 PROCEDURE — 99213 PR OFFICE/OUTPT VISIT, EST, LEVL III, 20-29 MIN: ICD-10-PCS | Mod: S$PBB,,, | Performed by: NURSE PRACTITIONER

## 2021-02-05 PROCEDURE — 99213 OFFICE O/P EST LOW 20 MIN: CPT | Mod: S$PBB,,, | Performed by: NURSE PRACTITIONER

## 2021-02-05 PROCEDURE — 82728 ASSAY OF FERRITIN: CPT

## 2021-02-05 PROCEDURE — 99999 PR PBB SHADOW E&M-EST. PATIENT-LVL V: ICD-10-PCS | Mod: PBBFAC,,, | Performed by: NURSE PRACTITIONER

## 2021-02-05 PROCEDURE — 36415 COLL VENOUS BLD VENIPUNCTURE: CPT | Mod: PN

## 2021-02-05 PROCEDURE — 85025 COMPLETE CBC W/AUTO DIFF WBC: CPT | Mod: PN

## 2021-02-05 PROCEDURE — 99215 OFFICE O/P EST HI 40 MIN: CPT | Mod: PBBFAC,PN | Performed by: NURSE PRACTITIONER

## 2021-02-05 PROCEDURE — 82728 ASSAY OF FERRITIN: CPT | Mod: PN

## 2021-02-05 PROCEDURE — 99999 PR PBB SHADOW E&M-EST. PATIENT-LVL V: CPT | Mod: PBBFAC,,, | Performed by: NURSE PRACTITIONER

## 2021-02-05 PROCEDURE — 83550 IRON BINDING TEST: CPT | Mod: PN

## 2021-02-05 PROCEDURE — 80053 COMPREHEN METABOLIC PANEL: CPT

## 2021-02-05 PROCEDURE — 85025 COMPLETE CBC W/AUTO DIFF WBC: CPT

## 2021-02-05 PROCEDURE — 80053 COMPREHEN METABOLIC PANEL: CPT | Mod: PN

## 2021-02-05 PROCEDURE — 83550 IRON BINDING TEST: CPT

## 2021-02-05 RX ORDER — IRON,CARBONYL/ASCORBIC ACID 100-250 MG
TABLET ORAL
Qty: 90 TABLET | Refills: 2 | Status: SHIPPED | OUTPATIENT
Start: 2021-02-05 | End: 2021-08-18 | Stop reason: SDUPTHER

## 2021-02-05 RX ORDER — WARFARIN 10 MG/1
10 TABLET ORAL NIGHTLY
COMMUNITY
Start: 2021-01-04

## 2021-03-01 ENCOUNTER — PROCEDURE VISIT (OUTPATIENT)
Dept: OPHTHALMOLOGY | Facility: CLINIC | Age: 81
End: 2021-03-01
Payer: MEDICARE

## 2021-03-01 DIAGNOSIS — H43.393 VITREOUS FLOATERS OF BOTH EYES: ICD-10-CM

## 2021-03-01 DIAGNOSIS — H35.3211 EXUDATIVE AGE-RELATED MACULAR DEGENERATION OF RIGHT EYE WITH ACTIVE CHOROIDAL NEOVASCULARIZATION: Primary | ICD-10-CM

## 2021-03-01 DIAGNOSIS — H35.3222 EXUDATIVE AGE-RELATED MACULAR DEGENERATION OF LEFT EYE WITH INACTIVE CHOROIDAL NEOVASCULARIZATION: ICD-10-CM

## 2021-03-01 PROCEDURE — 67028 INJECTION EYE DRUG: CPT | Mod: S$PBB,RT,, | Performed by: OPHTHALMOLOGY

## 2021-03-01 PROCEDURE — 92134 POSTERIOR SEGMENT OCT RETINA (OCULAR COHERENCE TOMOGRAPHY)-BOTH EYES: ICD-10-PCS | Mod: 26,S$PBB,, | Performed by: OPHTHALMOLOGY

## 2021-03-01 PROCEDURE — 67028 INJECTION EYE DRUG: CPT | Mod: PBBFAC,PO,RT | Performed by: OPHTHALMOLOGY

## 2021-03-01 PROCEDURE — 92134 CPTRZ OPH DX IMG PST SGM RTA: CPT | Mod: PBBFAC,PO | Performed by: OPHTHALMOLOGY

## 2021-03-01 PROCEDURE — 67028 PR INJECT INTRAVITREAL PHARMCOLOGIC: ICD-10-PCS | Mod: S$PBB,RT,, | Performed by: OPHTHALMOLOGY

## 2021-03-01 RX ADMIN — AFLIBERCEPT 2 MG: 40 INJECTION, SOLUTION INTRAVITREAL at 03:03

## 2021-03-05 ENCOUNTER — OFFICE VISIT (OUTPATIENT)
Dept: PODIATRY | Facility: CLINIC | Age: 81
End: 2021-03-05
Payer: MEDICARE

## 2021-03-05 VITALS — BODY MASS INDEX: 33.07 KG/M2 | WEIGHT: 179.69 LBS | HEIGHT: 62 IN

## 2021-03-05 DIAGNOSIS — E11.42 DIABETIC POLYNEUROPATHY ASSOCIATED WITH TYPE 2 DIABETES MELLITUS: Primary | ICD-10-CM

## 2021-03-05 DIAGNOSIS — M20.42 HAMMER TOES OF BOTH FEET: ICD-10-CM

## 2021-03-05 DIAGNOSIS — L84 CORN OR CALLUS: ICD-10-CM

## 2021-03-05 DIAGNOSIS — M20.41 HAMMER TOES OF BOTH FEET: ICD-10-CM

## 2021-03-05 DIAGNOSIS — B35.1 ONYCHOMYCOSIS DUE TO DERMATOPHYTE: ICD-10-CM

## 2021-03-05 PROCEDURE — 11057 PARNG/CUTG B9 HYPRKR LES >4: CPT | Mod: PBBFAC,PN | Performed by: PODIATRIST

## 2021-03-05 PROCEDURE — 99999 PR PBB SHADOW E&M-EST. PATIENT-LVL II: CPT | Mod: PBBFAC,,, | Performed by: PODIATRIST

## 2021-03-05 PROCEDURE — 99212 OFFICE O/P EST SF 10 MIN: CPT | Mod: PBBFAC,PN,25 | Performed by: PODIATRIST

## 2021-03-05 PROCEDURE — 11721 PR DEBRIDEMENT OF NAILS, 6 OR MORE: ICD-10-PCS | Mod: Q9,59,S$PBB, | Performed by: PODIATRIST

## 2021-03-05 PROCEDURE — 11721 DEBRIDE NAIL 6 OR MORE: CPT | Mod: Q9,PBBFAC,PN,59 | Performed by: PODIATRIST

## 2021-03-05 PROCEDURE — 99999 PR PBB SHADOW E&M-EST. PATIENT-LVL II: ICD-10-PCS | Mod: PBBFAC,,, | Performed by: PODIATRIST

## 2021-03-05 PROCEDURE — 11057 PARNG/CUTG B9 HYPRKR LES >4: CPT | Mod: Q9,S$PBB,, | Performed by: PODIATRIST

## 2021-03-05 PROCEDURE — 11057 PR TRIM BENIGN HYPERKERATOTIC SKIN LESION,>4: ICD-10-PCS | Mod: Q9,S$PBB,, | Performed by: PODIATRIST

## 2021-03-05 PROCEDURE — 11721 DEBRIDE NAIL 6 OR MORE: CPT | Mod: Q9,59,S$PBB, | Performed by: PODIATRIST

## 2021-03-05 PROCEDURE — 99213 OFFICE O/P EST LOW 20 MIN: CPT | Mod: 25,S$PBB,, | Performed by: PODIATRIST

## 2021-03-05 PROCEDURE — 99213 PR OFFICE/OUTPT VISIT, EST, LEVL III, 20-29 MIN: ICD-10-PCS | Mod: 25,S$PBB,, | Performed by: PODIATRIST

## 2021-03-17 ENCOUNTER — PES CALL (OUTPATIENT)
Dept: ADMINISTRATIVE | Facility: CLINIC | Age: 81
End: 2021-03-17

## 2021-04-19 ENCOUNTER — PROCEDURE VISIT (OUTPATIENT)
Dept: OPHTHALMOLOGY | Facility: CLINIC | Age: 81
End: 2021-04-19
Payer: MEDICARE

## 2021-04-19 DIAGNOSIS — H43.393 VITREOUS FLOATERS OF BOTH EYES: ICD-10-CM

## 2021-04-19 DIAGNOSIS — H35.3222 EXUDATIVE AGE-RELATED MACULAR DEGENERATION OF LEFT EYE WITH INACTIVE CHOROIDAL NEOVASCULARIZATION: ICD-10-CM

## 2021-04-19 DIAGNOSIS — H35.3211 EXUDATIVE AGE-RELATED MACULAR DEGENERATION OF RIGHT EYE WITH ACTIVE CHOROIDAL NEOVASCULARIZATION: Primary | ICD-10-CM

## 2021-04-19 PROCEDURE — 67028 PR INJECT INTRAVITREAL PHARMCOLOGIC: ICD-10-PCS | Mod: S$PBB,RT,, | Performed by: OPHTHALMOLOGY

## 2021-04-19 PROCEDURE — 99214 OFFICE O/P EST MOD 30 MIN: CPT | Mod: 25,S$PBB,, | Performed by: OPHTHALMOLOGY

## 2021-04-19 PROCEDURE — 92134 CPTRZ OPH DX IMG PST SGM RTA: CPT | Mod: PBBFAC,PO | Performed by: OPHTHALMOLOGY

## 2021-04-19 PROCEDURE — 67028 INJECTION EYE DRUG: CPT | Mod: PBBFAC,PO,RT | Performed by: OPHTHALMOLOGY

## 2021-04-19 PROCEDURE — 67028 INJECTION EYE DRUG: CPT | Mod: S$PBB,RT,, | Performed by: OPHTHALMOLOGY

## 2021-04-19 PROCEDURE — 99214 PR OFFICE/OUTPT VISIT, EST, LEVL IV, 30-39 MIN: ICD-10-PCS | Mod: 25,S$PBB,, | Performed by: OPHTHALMOLOGY

## 2021-04-19 PROCEDURE — 92134 POSTERIOR SEGMENT OCT RETINA (OCULAR COHERENCE TOMOGRAPHY)-BOTH EYES: ICD-10-PCS | Mod: 26,S$PBB,, | Performed by: OPHTHALMOLOGY

## 2021-04-19 RX ADMIN — AFLIBERCEPT 2 MG: 40 INJECTION, SOLUTION INTRAVITREAL at 03:04

## 2021-05-27 ENCOUNTER — LAB VISIT (OUTPATIENT)
Dept: LAB | Facility: HOSPITAL | Age: 81
End: 2021-05-27
Attending: INTERNAL MEDICINE
Payer: MEDICARE

## 2021-05-27 DIAGNOSIS — R79.1 ABNORMAL COAGULATION PROFILE: Primary | ICD-10-CM

## 2021-05-27 LAB
INR PPP: 3.2 (ref 0.8–1.2)
PROTHROMBIN TIME: 31.9 SEC (ref 9–12.5)

## 2021-05-27 PROCEDURE — 85610 PROTHROMBIN TIME: CPT | Mod: PO | Performed by: INTERNAL MEDICINE

## 2021-05-27 PROCEDURE — 36415 COLL VENOUS BLD VENIPUNCTURE: CPT | Mod: PO | Performed by: INTERNAL MEDICINE

## 2021-06-04 ENCOUNTER — LAB VISIT (OUTPATIENT)
Dept: LAB | Facility: HOSPITAL | Age: 81
End: 2021-06-04
Attending: INTERNAL MEDICINE
Payer: MEDICARE

## 2021-06-04 DIAGNOSIS — R79.1 ABNORMAL COAGULATION PROFILE: ICD-10-CM

## 2021-06-04 DIAGNOSIS — R73.09 IMPAIRED GLUCOSE TOLERANCE TEST: Primary | ICD-10-CM

## 2021-06-04 LAB
ESTIMATED AVG GLUCOSE: 128 MG/DL (ref 68–131)
HBA1C MFR BLD: 6.1 % (ref 4–5.6)
INR PPP: 4.1 (ref 0.8–1.2)
PROTHROMBIN TIME: 39.9 SEC (ref 9–12.5)

## 2021-06-04 PROCEDURE — 85610 PROTHROMBIN TIME: CPT | Mod: PO | Performed by: INTERNAL MEDICINE

## 2021-06-04 PROCEDURE — 36415 COLL VENOUS BLD VENIPUNCTURE: CPT | Mod: PO | Performed by: INTERNAL MEDICINE

## 2021-06-04 PROCEDURE — 83036 HEMOGLOBIN GLYCOSYLATED A1C: CPT | Performed by: INTERNAL MEDICINE

## 2021-06-10 ENCOUNTER — PATIENT MESSAGE (OUTPATIENT)
Dept: OPHTHALMOLOGY | Facility: CLINIC | Age: 81
End: 2021-06-10

## 2021-06-11 ENCOUNTER — LAB VISIT (OUTPATIENT)
Dept: LAB | Facility: HOSPITAL | Age: 81
End: 2021-06-11
Payer: MEDICARE

## 2021-06-11 DIAGNOSIS — R79.1 ABNORMAL COAGULATION PROFILE: Primary | ICD-10-CM

## 2021-06-11 LAB
INR PPP: 1.1 (ref 0.8–1.2)
PROTHROMBIN TIME: 11.7 SEC (ref 9–12.5)

## 2021-06-11 PROCEDURE — 36415 COLL VENOUS BLD VENIPUNCTURE: CPT | Mod: PO | Performed by: INTERNAL MEDICINE

## 2021-06-11 PROCEDURE — 85610 PROTHROMBIN TIME: CPT | Mod: PO | Performed by: INTERNAL MEDICINE

## 2021-06-22 ENCOUNTER — LAB VISIT (OUTPATIENT)
Dept: LAB | Facility: HOSPITAL | Age: 81
End: 2021-06-22
Payer: MEDICARE

## 2021-06-22 DIAGNOSIS — R79.1 ABNORMAL COAGULATION PROFILE: Primary | ICD-10-CM

## 2021-06-22 LAB
INR PPP: 3 (ref 0.8–1.2)
PROTHROMBIN TIME: 30 SEC (ref 9–12.5)

## 2021-06-22 PROCEDURE — 85610 PROTHROMBIN TIME: CPT | Mod: PO | Performed by: INTERNAL MEDICINE

## 2021-06-22 PROCEDURE — 36415 COLL VENOUS BLD VENIPUNCTURE: CPT | Mod: PO | Performed by: INTERNAL MEDICINE

## 2021-06-28 ENCOUNTER — LAB VISIT (OUTPATIENT)
Dept: LAB | Facility: HOSPITAL | Age: 81
End: 2021-06-28
Attending: INTERNAL MEDICINE
Payer: MEDICARE

## 2021-06-28 DIAGNOSIS — R79.89 HYPOURICEMIA: ICD-10-CM

## 2021-06-28 DIAGNOSIS — R68.89 MECHANICAL AND MOTOR PROBLEMS WITH INTERNAL ORGANS: ICD-10-CM

## 2021-06-28 DIAGNOSIS — Z79.01 LONG TERM (CURRENT) USE OF ANTICOAGULANTS: Primary | ICD-10-CM

## 2021-06-28 LAB
BASOPHILS # BLD AUTO: 0.03 K/UL (ref 0–0.2)
BASOPHILS NFR BLD: 0.5 % (ref 0–1.9)
DIFFERENTIAL METHOD: ABNORMAL
EOSINOPHIL # BLD AUTO: 0.2 K/UL (ref 0–0.5)
EOSINOPHIL NFR BLD: 2.7 % (ref 0–8)
ERYTHROCYTE [DISTWIDTH] IN BLOOD BY AUTOMATED COUNT: 14.1 % (ref 11.5–14.5)
HCT VFR BLD AUTO: 31.6 % (ref 37–48.5)
HGB BLD-MCNC: 9.6 G/DL (ref 12–16)
IMM GRANULOCYTES # BLD AUTO: 0.02 K/UL (ref 0–0.04)
IMM GRANULOCYTES NFR BLD AUTO: 0.4 % (ref 0–0.5)
INR PPP: 2 (ref 0.8–1.2)
LYMPHOCYTES # BLD AUTO: 0.5 K/UL (ref 1–4.8)
LYMPHOCYTES NFR BLD: 8.7 % (ref 18–48)
MCH RBC QN AUTO: 28.8 PG (ref 27–31)
MCHC RBC AUTO-ENTMCNC: 30.4 G/DL (ref 32–36)
MCV RBC AUTO: 95 FL (ref 82–98)
MONOCYTES # BLD AUTO: 0.6 K/UL (ref 0.3–1)
MONOCYTES NFR BLD: 10.3 % (ref 4–15)
NEUTROPHILS # BLD AUTO: 4.4 K/UL (ref 1.8–7.7)
NEUTROPHILS NFR BLD: 77.4 % (ref 38–73)
NRBC BLD-RTO: 0 /100 WBC
PLATELET # BLD AUTO: 194 K/UL (ref 150–450)
PMV BLD AUTO: 11.4 FL (ref 9.2–12.9)
PROTHROMBIN TIME: 20.3 SEC (ref 9–12.5)
RBC # BLD AUTO: 3.33 M/UL (ref 4–5.4)
WBC # BLD AUTO: 5.62 K/UL (ref 3.9–12.7)

## 2021-06-28 PROCEDURE — 85025 COMPLETE CBC W/AUTO DIFF WBC: CPT | Performed by: INTERNAL MEDICINE

## 2021-06-28 PROCEDURE — 80053 COMPREHEN METABOLIC PANEL: CPT | Performed by: INTERNAL MEDICINE

## 2021-06-28 PROCEDURE — 85610 PROTHROMBIN TIME: CPT | Mod: PO | Performed by: INTERNAL MEDICINE

## 2021-06-28 PROCEDURE — 36415 COLL VENOUS BLD VENIPUNCTURE: CPT | Mod: PO | Performed by: INTERNAL MEDICINE

## 2021-06-29 ENCOUNTER — TELEPHONE (OUTPATIENT)
Dept: OPHTHALMOLOGY | Facility: CLINIC | Age: 81
End: 2021-06-29

## 2021-06-29 LAB
ALBUMIN SERPL BCP-MCNC: 2.8 G/DL (ref 3.5–5.2)
ALP SERPL-CCNC: 148 U/L (ref 55–135)
ALT SERPL W/O P-5'-P-CCNC: 24 U/L (ref 10–44)
ANION GAP SERPL CALC-SCNC: 10 MMOL/L (ref 8–16)
AST SERPL-CCNC: 15 U/L (ref 10–40)
BILIRUB SERPL-MCNC: 0.2 MG/DL (ref 0.1–1)
BUN SERPL-MCNC: 25 MG/DL (ref 8–23)
CALCIUM SERPL-MCNC: 9.1 MG/DL (ref 8.7–10.5)
CHLORIDE SERPL-SCNC: 103 MMOL/L (ref 95–110)
CO2 SERPL-SCNC: 27 MMOL/L (ref 23–29)
CREAT SERPL-MCNC: 1 MG/DL (ref 0.5–1.4)
EST. GFR  (AFRICAN AMERICAN): >60 ML/MIN/1.73 M^2
EST. GFR  (NON AFRICAN AMERICAN): 53.3 ML/MIN/1.73 M^2
GLUCOSE SERPL-MCNC: 131 MG/DL (ref 70–110)
POTASSIUM SERPL-SCNC: 4.7 MMOL/L (ref 3.5–5.1)
PROT SERPL-MCNC: 6.2 G/DL (ref 6–8.4)
SODIUM SERPL-SCNC: 140 MMOL/L (ref 136–145)

## 2021-07-06 ENCOUNTER — LAB VISIT (OUTPATIENT)
Dept: LAB | Facility: HOSPITAL | Age: 81
End: 2021-07-06
Attending: INTERNAL MEDICINE
Payer: MEDICARE

## 2021-07-06 DIAGNOSIS — R79.1 ABNORMAL COAGULATION PROFILE: Primary | ICD-10-CM

## 2021-07-06 LAB
INR PPP: 4.8 (ref 0.8–1.2)
PROTHROMBIN TIME: 46.7 SEC (ref 9–12.5)

## 2021-07-06 PROCEDURE — 36415 COLL VENOUS BLD VENIPUNCTURE: CPT | Mod: PO | Performed by: INTERNAL MEDICINE

## 2021-07-06 PROCEDURE — 85610 PROTHROMBIN TIME: CPT | Mod: PO | Performed by: INTERNAL MEDICINE

## 2021-07-09 ENCOUNTER — LAB VISIT (OUTPATIENT)
Dept: LAB | Facility: HOSPITAL | Age: 81
End: 2021-07-09
Attending: INTERNAL MEDICINE
Payer: MEDICARE

## 2021-07-09 DIAGNOSIS — Z79.01 LONG TERM (CURRENT) USE OF ANTICOAGULANTS: Primary | ICD-10-CM

## 2021-07-09 LAB
INR PPP: 1.5 (ref 0.8–1.2)
PROTHROMBIN TIME: 15.4 SEC (ref 9–12.5)

## 2021-07-09 PROCEDURE — 85610 PROTHROMBIN TIME: CPT | Mod: PO | Performed by: INTERNAL MEDICINE

## 2021-07-09 PROCEDURE — 36415 COLL VENOUS BLD VENIPUNCTURE: CPT | Mod: PO | Performed by: INTERNAL MEDICINE

## 2021-07-15 ENCOUNTER — OFFICE VISIT (OUTPATIENT)
Dept: OPTOMETRY | Facility: CLINIC | Age: 81
End: 2021-07-15
Payer: MEDICARE

## 2021-07-15 DIAGNOSIS — H10.531 CONTACT BLEPHAROCONJUNCTIVITIS OF RIGHT EYE: Primary | ICD-10-CM

## 2021-07-15 PROCEDURE — 99999 PR PBB SHADOW E&M-EST. PATIENT-LVL III: ICD-10-PCS | Mod: PBBFAC,,, | Performed by: OPTOMETRIST

## 2021-07-15 PROCEDURE — 99213 OFFICE O/P EST LOW 20 MIN: CPT | Mod: PBBFAC,PO | Performed by: OPTOMETRIST

## 2021-07-15 PROCEDURE — 99999 PR PBB SHADOW E&M-EST. PATIENT-LVL III: CPT | Mod: PBBFAC,,, | Performed by: OPTOMETRIST

## 2021-07-15 PROCEDURE — 92002 INTRM OPH EXAM NEW PATIENT: CPT | Mod: S$PBB,,, | Performed by: OPTOMETRIST

## 2021-07-15 PROCEDURE — 92002 PR EYE EXAM, NEW PATIENT,INTERMED: ICD-10-PCS | Mod: S$PBB,,, | Performed by: OPTOMETRIST

## 2021-07-15 RX ORDER — TOBRAMYCIN 3 MG/ML
1 SOLUTION/ DROPS OPHTHALMIC 4 TIMES DAILY
Qty: 5 ML | Refills: 0 | Status: SHIPPED | OUTPATIENT
Start: 2021-07-15 | End: 2021-07-22

## 2021-07-16 ENCOUNTER — OFFICE VISIT (OUTPATIENT)
Dept: PODIATRY | Facility: CLINIC | Age: 81
End: 2021-07-16
Payer: MEDICARE

## 2021-07-16 VITALS — HEIGHT: 62 IN | BODY MASS INDEX: 36.79 KG/M2 | WEIGHT: 199.94 LBS

## 2021-07-16 DIAGNOSIS — M20.41 HAMMER TOES OF BOTH FEET: ICD-10-CM

## 2021-07-16 DIAGNOSIS — L84 CORN OR CALLUS: ICD-10-CM

## 2021-07-16 DIAGNOSIS — B35.1 ONYCHOMYCOSIS DUE TO DERMATOPHYTE: ICD-10-CM

## 2021-07-16 DIAGNOSIS — E11.42 DIABETIC POLYNEUROPATHY ASSOCIATED WITH TYPE 2 DIABETES MELLITUS: Primary | ICD-10-CM

## 2021-07-16 DIAGNOSIS — M20.42 HAMMER TOES OF BOTH FEET: ICD-10-CM

## 2021-07-16 PROCEDURE — 11057 PARNG/CUTG B9 HYPRKR LES >4: CPT | Mod: Q9,S$PBB,, | Performed by: PODIATRIST

## 2021-07-16 PROCEDURE — 99499 NO LOS: ICD-10-PCS | Mod: S$PBB,,, | Performed by: PODIATRIST

## 2021-07-16 PROCEDURE — 99999 PR PBB SHADOW E&M-EST. PATIENT-LVL III: ICD-10-PCS | Mod: PBBFAC,,, | Performed by: PODIATRIST

## 2021-07-16 PROCEDURE — 11721 DEBRIDE NAIL 6 OR MORE: CPT | Performed by: PODIATRIST

## 2021-07-16 PROCEDURE — 11057 PR TRIM BENIGN HYPERKERATOTIC SKIN LESION,>4: ICD-10-PCS | Mod: Q9,S$PBB,, | Performed by: PODIATRIST

## 2021-07-16 PROCEDURE — 99499 UNLISTED E&M SERVICE: CPT | Mod: S$PBB,,, | Performed by: PODIATRIST

## 2021-07-16 PROCEDURE — 99999 PR PBB SHADOW E&M-EST. PATIENT-LVL III: CPT | Mod: PBBFAC,,, | Performed by: PODIATRIST

## 2021-07-16 PROCEDURE — 11057 PARNG/CUTG B9 HYPRKR LES >4: CPT | Mod: PBBFAC,PN | Performed by: PODIATRIST

## 2021-07-16 PROCEDURE — 11721 DEBRIDE NAIL 6 OR MORE: CPT | Mod: Q9,59,S$PBB, | Performed by: PODIATRIST

## 2021-07-16 PROCEDURE — 99213 OFFICE O/P EST LOW 20 MIN: CPT | Mod: PBBFAC,PN,25 | Performed by: PODIATRIST

## 2021-07-16 PROCEDURE — 11721 PR DEBRIDEMENT OF NAILS, 6 OR MORE: ICD-10-PCS | Mod: Q9,59,S$PBB, | Performed by: PODIATRIST

## 2021-07-19 ENCOUNTER — LAB VISIT (OUTPATIENT)
Dept: LAB | Facility: HOSPITAL | Age: 81
End: 2021-07-19
Payer: MEDICARE

## 2021-07-19 DIAGNOSIS — Z79.01 LONG TERM (CURRENT) USE OF ANTICOAGULANTS: Primary | ICD-10-CM

## 2021-07-19 LAB
INR PPP: 2 (ref 0.8–1.2)
PROTHROMBIN TIME: 20.8 SEC (ref 9–12.5)

## 2021-07-19 PROCEDURE — 85610 PROTHROMBIN TIME: CPT | Mod: PO | Performed by: INTERNAL MEDICINE

## 2021-07-19 PROCEDURE — 36415 COLL VENOUS BLD VENIPUNCTURE: CPT | Mod: PO | Performed by: INTERNAL MEDICINE

## 2021-07-28 ENCOUNTER — TELEPHONE (OUTPATIENT)
Dept: HEMATOLOGY/ONCOLOGY | Facility: CLINIC | Age: 81
End: 2021-07-28

## 2021-07-29 ENCOUNTER — TELEPHONE (OUTPATIENT)
Dept: HEMATOLOGY/ONCOLOGY | Facility: CLINIC | Age: 81
End: 2021-07-29

## 2021-08-02 ENCOUNTER — LAB VISIT (OUTPATIENT)
Dept: LAB | Facility: HOSPITAL | Age: 81
End: 2021-08-02
Attending: INTERNAL MEDICINE
Payer: MEDICARE

## 2021-08-02 DIAGNOSIS — R41.0 SUBACUTE DELIRIUM: Primary | ICD-10-CM

## 2021-08-02 LAB
ALBUMIN SERPL BCP-MCNC: 3.1 G/DL (ref 3.5–5.2)
ALP SERPL-CCNC: 178 U/L (ref 55–135)
ALT SERPL W/O P-5'-P-CCNC: 13 U/L (ref 10–44)
ANION GAP SERPL CALC-SCNC: 9 MMOL/L (ref 8–16)
AST SERPL-CCNC: 14 U/L (ref 10–40)
BASOPHILS # BLD AUTO: 0.03 K/UL (ref 0–0.2)
BASOPHILS NFR BLD: 0.5 % (ref 0–1.9)
BILIRUB SERPL-MCNC: 0.2 MG/DL (ref 0.1–1)
BILIRUB UR QL STRIP: NEGATIVE
BUN SERPL-MCNC: 31 MG/DL (ref 8–23)
CALCIUM SERPL-MCNC: 9.2 MG/DL (ref 8.7–10.5)
CHLORIDE SERPL-SCNC: 105 MMOL/L (ref 95–110)
CLARITY UR: CLEAR
CO2 SERPL-SCNC: 29 MMOL/L (ref 23–29)
COLOR UR: YELLOW
CREAT SERPL-MCNC: 1.1 MG/DL (ref 0.5–1.4)
DIFFERENTIAL METHOD: ABNORMAL
EOSINOPHIL # BLD AUTO: 0.2 K/UL (ref 0–0.5)
EOSINOPHIL NFR BLD: 3.2 % (ref 0–8)
ERYTHROCYTE [DISTWIDTH] IN BLOOD BY AUTOMATED COUNT: 14.6 % (ref 11.5–14.5)
EST. GFR  (AFRICAN AMERICAN): 54.4 ML/MIN/1.73 M^2
EST. GFR  (NON AFRICAN AMERICAN): 47.2 ML/MIN/1.73 M^2
GLUCOSE SERPL-MCNC: 126 MG/DL (ref 70–110)
GLUCOSE UR QL STRIP: NEGATIVE
HCT VFR BLD AUTO: 31.1 % (ref 37–48.5)
HGB BLD-MCNC: 9.7 G/DL (ref 12–16)
HGB UR QL STRIP: NEGATIVE
IMM GRANULOCYTES # BLD AUTO: 0.01 K/UL (ref 0–0.04)
IMM GRANULOCYTES NFR BLD AUTO: 0.2 % (ref 0–0.5)
KETONES UR QL STRIP: NEGATIVE
LEUKOCYTE ESTERASE UR QL STRIP: NEGATIVE
LYMPHOCYTES # BLD AUTO: 0.6 K/UL (ref 1–4.8)
LYMPHOCYTES NFR BLD: 11.1 % (ref 18–48)
MCH RBC QN AUTO: 29.3 PG (ref 27–31)
MCHC RBC AUTO-ENTMCNC: 31.2 G/DL (ref 32–36)
MCV RBC AUTO: 94 FL (ref 82–98)
MONOCYTES # BLD AUTO: 0.6 K/UL (ref 0.3–1)
MONOCYTES NFR BLD: 10.7 % (ref 4–15)
NEUTROPHILS # BLD AUTO: 4.2 K/UL (ref 1.8–7.7)
NEUTROPHILS NFR BLD: 74.3 % (ref 38–73)
NITRITE UR QL STRIP: NEGATIVE
NRBC BLD-RTO: 0 /100 WBC
PH UR STRIP: 6 [PH] (ref 5–8)
PLATELET # BLD AUTO: 209 K/UL (ref 150–450)
PMV BLD AUTO: 10.8 FL (ref 9.2–12.9)
POTASSIUM SERPL-SCNC: 4.3 MMOL/L (ref 3.5–5.1)
PROT SERPL-MCNC: 6.6 G/DL (ref 6–8.4)
PROT UR QL STRIP: NEGATIVE
RBC # BLD AUTO: 3.31 M/UL (ref 4–5.4)
SODIUM SERPL-SCNC: 143 MMOL/L (ref 136–145)
SP GR UR STRIP: 1.02 (ref 1–1.03)
URN SPEC COLLECT METH UR: NORMAL
WBC # BLD AUTO: 5.68 K/UL (ref 3.9–12.7)

## 2021-08-02 PROCEDURE — 81003 URINALYSIS AUTO W/O SCOPE: CPT | Mod: PO | Performed by: INTERNAL MEDICINE

## 2021-08-02 PROCEDURE — 80053 COMPREHEN METABOLIC PANEL: CPT | Performed by: INTERNAL MEDICINE

## 2021-08-02 PROCEDURE — 87086 URINE CULTURE/COLONY COUNT: CPT | Performed by: INTERNAL MEDICINE

## 2021-08-02 PROCEDURE — 36415 COLL VENOUS BLD VENIPUNCTURE: CPT | Mod: PO | Performed by: INTERNAL MEDICINE

## 2021-08-02 PROCEDURE — 85025 COMPLETE CBC W/AUTO DIFF WBC: CPT | Performed by: INTERNAL MEDICINE

## 2021-08-04 LAB
BACTERIA UR CULT: NORMAL
BACTERIA UR CULT: NORMAL

## 2021-08-16 ENCOUNTER — PROCEDURE VISIT (OUTPATIENT)
Dept: OPHTHALMOLOGY | Facility: CLINIC | Age: 81
End: 2021-08-16
Payer: MEDICARE

## 2021-08-16 DIAGNOSIS — H35.3211 EXUDATIVE AGE-RELATED MACULAR DEGENERATION OF RIGHT EYE WITH ACTIVE CHOROIDAL NEOVASCULARIZATION: Primary | ICD-10-CM

## 2021-08-16 PROCEDURE — 67028 INJECTION EYE DRUG: CPT | Mod: PBBFAC,PO,RT | Performed by: OPHTHALMOLOGY

## 2021-08-16 PROCEDURE — 67028 INJECTION EYE DRUG: CPT | Mod: S$PBB,RT,, | Performed by: OPHTHALMOLOGY

## 2021-08-16 PROCEDURE — 67028 PR INJECT INTRAVITREAL PHARMCOLOGIC: ICD-10-PCS | Mod: S$PBB,RT,, | Performed by: OPHTHALMOLOGY

## 2021-08-16 PROCEDURE — 92012 PR EYE EXAM, EST PATIENT,INTERMED: ICD-10-PCS | Mod: 25,S$PBB,, | Performed by: OPHTHALMOLOGY

## 2021-08-16 PROCEDURE — 92012 INTRM OPH EXAM EST PATIENT: CPT | Mod: 25,S$PBB,, | Performed by: OPHTHALMOLOGY

## 2021-08-16 PROCEDURE — 92134 POSTERIOR SEGMENT OCT RETINA (OCULAR COHERENCE TOMOGRAPHY)-BOTH EYES: ICD-10-PCS | Mod: 26,S$PBB,, | Performed by: OPHTHALMOLOGY

## 2021-08-16 PROCEDURE — 92134 CPTRZ OPH DX IMG PST SGM RTA: CPT | Mod: PBBFAC,PO | Performed by: OPHTHALMOLOGY

## 2021-08-16 RX ADMIN — AFLIBERCEPT 2 MG: 40 INJECTION, SOLUTION INTRAVITREAL at 10:08

## 2021-08-18 ENCOUNTER — LAB VISIT (OUTPATIENT)
Dept: LAB | Facility: HOSPITAL | Age: 81
End: 2021-08-18
Attending: NURSE PRACTITIONER
Payer: MEDICARE

## 2021-08-18 ENCOUNTER — PATIENT MESSAGE (OUTPATIENT)
Dept: HEMATOLOGY/ONCOLOGY | Facility: CLINIC | Age: 81
End: 2021-08-18

## 2021-08-18 ENCOUNTER — OFFICE VISIT (OUTPATIENT)
Dept: HEMATOLOGY/ONCOLOGY | Facility: CLINIC | Age: 81
End: 2021-08-18
Payer: MEDICARE

## 2021-08-18 VITALS
TEMPERATURE: 99 F | WEIGHT: 181.69 LBS | DIASTOLIC BLOOD PRESSURE: 60 MMHG | HEART RATE: 57 BPM | OXYGEN SATURATION: 94 % | BODY MASS INDEX: 33.43 KG/M2 | HEIGHT: 62 IN | SYSTOLIC BLOOD PRESSURE: 136 MMHG

## 2021-08-18 DIAGNOSIS — D50.9 IRON DEFICIENCY ANEMIA, UNSPECIFIED IRON DEFICIENCY ANEMIA TYPE: ICD-10-CM

## 2021-08-18 DIAGNOSIS — D50.9 IRON DEFICIENCY ANEMIA, UNSPECIFIED IRON DEFICIENCY ANEMIA TYPE: Primary | ICD-10-CM

## 2021-08-18 LAB
ANION GAP SERPL CALC-SCNC: 11 MMOL/L (ref 8–16)
BASOPHILS # BLD AUTO: 0.04 K/UL (ref 0–0.2)
BASOPHILS NFR BLD: 0.6 % (ref 0–1.9)
BUN SERPL-MCNC: 34 MG/DL (ref 8–23)
CALCIUM SERPL-MCNC: 9 MG/DL (ref 8.7–10.5)
CHLORIDE SERPL-SCNC: 105 MMOL/L (ref 95–110)
CO2 SERPL-SCNC: 28 MMOL/L (ref 23–29)
CREAT SERPL-MCNC: 1 MG/DL (ref 0.5–1.4)
DIFFERENTIAL METHOD: ABNORMAL
EOSINOPHIL # BLD AUTO: 0.3 K/UL (ref 0–0.5)
EOSINOPHIL NFR BLD: 3.8 % (ref 0–8)
ERYTHROCYTE [DISTWIDTH] IN BLOOD BY AUTOMATED COUNT: 14.3 % (ref 11.5–14.5)
EST. GFR  (AFRICAN AMERICAN): >60 ML/MIN/1.73 M^2
EST. GFR  (NON AFRICAN AMERICAN): 53 ML/MIN/1.73 M^2
FERRITIN SERPL-MCNC: 65 NG/ML (ref 20–300)
GLUCOSE SERPL-MCNC: 130 MG/DL (ref 70–110)
HCT VFR BLD AUTO: 30.6 % (ref 37–48.5)
HGB BLD-MCNC: 9.6 G/DL (ref 12–16)
IMM GRANULOCYTES # BLD AUTO: 0.01 K/UL (ref 0–0.04)
IMM GRANULOCYTES NFR BLD AUTO: 0.1 % (ref 0–0.5)
IRON SERPL-MCNC: 52 UG/DL (ref 30–160)
LYMPHOCYTES # BLD AUTO: 0.8 K/UL (ref 1–4.8)
LYMPHOCYTES NFR BLD: 10.6 % (ref 18–48)
MCH RBC QN AUTO: 29.5 PG (ref 27–31)
MCHC RBC AUTO-ENTMCNC: 31.4 G/DL (ref 32–36)
MCV RBC AUTO: 94 FL (ref 82–98)
MONOCYTES # BLD AUTO: 0.9 K/UL (ref 0.3–1)
MONOCYTES NFR BLD: 12.3 % (ref 4–15)
NEUTROPHILS # BLD AUTO: 5.2 K/UL (ref 1.8–7.7)
NEUTROPHILS NFR BLD: 72.6 % (ref 38–73)
NRBC BLD-RTO: 0 /100 WBC
PLATELET # BLD AUTO: 218 K/UL (ref 150–450)
PMV BLD AUTO: 10.4 FL (ref 9.2–12.9)
POTASSIUM SERPL-SCNC: 4.2 MMOL/L (ref 3.5–5.1)
RBC # BLD AUTO: 3.25 M/UL (ref 4–5.4)
SATURATED IRON: 16 % (ref 20–50)
SODIUM SERPL-SCNC: 144 MMOL/L (ref 136–145)
TOTAL IRON BINDING CAPACITY: 317 UG/DL (ref 250–450)
TRANSFERRIN SERPL-MCNC: 214 MG/DL (ref 200–375)
WBC # BLD AUTO: 7.14 K/UL (ref 3.9–12.7)

## 2021-08-18 PROCEDURE — 85025 COMPLETE CBC W/AUTO DIFF WBC: CPT | Mod: PN | Performed by: NURSE PRACTITIONER

## 2021-08-18 PROCEDURE — 99999 PR PBB SHADOW E&M-EST. PATIENT-LVL IV: CPT | Mod: PBBFAC,,, | Performed by: NURSE PRACTITIONER

## 2021-08-18 PROCEDURE — 99999 PR PBB SHADOW E&M-EST. PATIENT-LVL IV: ICD-10-PCS | Mod: PBBFAC,,, | Performed by: NURSE PRACTITIONER

## 2021-08-18 PROCEDURE — 99213 PR OFFICE/OUTPT VISIT, EST, LEVL III, 20-29 MIN: ICD-10-PCS | Mod: S$PBB,,, | Performed by: NURSE PRACTITIONER

## 2021-08-18 PROCEDURE — 99214 OFFICE O/P EST MOD 30 MIN: CPT | Mod: PBBFAC,PN | Performed by: NURSE PRACTITIONER

## 2021-08-18 PROCEDURE — 80048 BASIC METABOLIC PNL TOTAL CA: CPT | Mod: PN | Performed by: NURSE PRACTITIONER

## 2021-08-18 PROCEDURE — 82728 ASSAY OF FERRITIN: CPT | Performed by: NURSE PRACTITIONER

## 2021-08-18 PROCEDURE — 84466 ASSAY OF TRANSFERRIN: CPT | Performed by: NURSE PRACTITIONER

## 2021-08-18 PROCEDURE — 36415 COLL VENOUS BLD VENIPUNCTURE: CPT | Mod: PN | Performed by: NURSE PRACTITIONER

## 2021-08-18 PROCEDURE — 99213 OFFICE O/P EST LOW 20 MIN: CPT | Mod: S$PBB,,, | Performed by: NURSE PRACTITIONER

## 2021-08-18 RX ORDER — IRON,CARBONYL/ASCORBIC ACID 100-250 MG
TABLET ORAL
Qty: 90 TABLET | Refills: 2 | Status: SHIPPED | OUTPATIENT
Start: 2021-08-18

## 2021-09-09 ENCOUNTER — LAB VISIT (OUTPATIENT)
Dept: LAB | Facility: HOSPITAL | Age: 81
End: 2021-09-09
Attending: INTERNAL MEDICINE
Payer: MEDICARE

## 2021-09-09 DIAGNOSIS — Z72.4 PROBLEMS RELATED TO INAPPROPRIATE DIET AND EATING HABITS: Primary | ICD-10-CM

## 2021-09-09 DIAGNOSIS — R73.09 IMPAIRED GLUCOSE TOLERANCE TEST: ICD-10-CM

## 2021-09-09 DIAGNOSIS — R79.1 ABNORMAL COAGULATION PROFILE: ICD-10-CM

## 2021-09-09 LAB
CHOLEST SERPL-MCNC: 109 MG/DL (ref 120–199)
CHOLEST/HDLC SERPL: 2.2 {RATIO} (ref 2–5)
ESTIMATED AVG GLUCOSE: 117 MG/DL (ref 68–131)
HBA1C MFR BLD: 5.7 % (ref 4–5.6)
HDLC SERPL-MCNC: 49 MG/DL (ref 40–75)
HDLC SERPL: 45 % (ref 20–50)
INR PPP: 1.6 (ref 0.8–1.2)
LDLC SERPL CALC-MCNC: 38.2 MG/DL (ref 63–159)
NONHDLC SERPL-MCNC: 60 MG/DL
PROTHROMBIN TIME: 16.6 SEC (ref 9–12.5)
TRIGL SERPL-MCNC: 109 MG/DL (ref 30–150)

## 2021-09-09 PROCEDURE — 83036 HEMOGLOBIN GLYCOSYLATED A1C: CPT | Performed by: INTERNAL MEDICINE

## 2021-09-09 PROCEDURE — 85610 PROTHROMBIN TIME: CPT | Mod: PO | Performed by: INTERNAL MEDICINE

## 2021-09-09 PROCEDURE — 80061 LIPID PANEL: CPT | Mod: GA | Performed by: INTERNAL MEDICINE

## 2021-09-09 PROCEDURE — 36415 COLL VENOUS BLD VENIPUNCTURE: CPT | Mod: PO | Performed by: INTERNAL MEDICINE

## 2021-09-19 PROBLEM — R00.1 SINUS BRADYCARDIA: Status: ACTIVE | Noted: 2021-09-19

## 2021-09-19 PROBLEM — I82.451 ACUTE DEEP VEIN THROMBOSIS (DVT) OF RIGHT PERONEAL VEIN: Status: ACTIVE | Noted: 2021-09-19

## 2021-09-19 PROBLEM — J90 PLEURAL EFFUSION ON RIGHT: Status: ACTIVE | Noted: 2021-09-19

## 2021-09-19 PROBLEM — K92.2 UGIB (UPPER GASTROINTESTINAL BLEED): Status: ACTIVE | Noted: 2021-09-19

## 2021-09-19 PROBLEM — R56.9 SEIZURE: Status: ACTIVE | Noted: 2021-09-19

## 2021-09-19 PROBLEM — J69.0 ASPIRATION PNEUMONIA: Status: ACTIVE | Noted: 2021-09-19

## 2021-09-19 PROBLEM — E16.2 HYPOGLYCEMIA: Status: ACTIVE | Noted: 2021-09-19

## 2021-09-19 PROBLEM — Z71.89 ACP (ADVANCE CARE PLANNING): Status: ACTIVE | Noted: 2021-09-19

## 2021-09-19 PROBLEM — G47.33 OSA ON CPAP: Status: ACTIVE | Noted: 2021-09-19

## 2021-09-19 PROBLEM — R50.9 FEVER: Status: ACTIVE | Noted: 2021-09-19

## 2021-09-19 PROBLEM — J96.01 ACUTE HYPOXEMIC RESPIRATORY FAILURE: Status: ACTIVE | Noted: 2021-09-19

## 2021-09-19 PROBLEM — I82.551 CHRONIC DEEP VEIN THROMBOSIS (DVT) OF RIGHT PERONEAL VEIN: Status: ACTIVE | Noted: 2021-09-19

## 2021-09-20 PROBLEM — Z75.8 DISCHARGE PLANNING ISSUES: Status: ACTIVE | Noted: 2021-09-20

## 2021-09-22 PROBLEM — K56.609 SBO (SMALL BOWEL OBSTRUCTION): Status: ACTIVE | Noted: 2021-09-22

## 2021-09-23 PROBLEM — R53.83 LETHARGY: Status: ACTIVE | Noted: 2021-09-23

## 2021-09-24 PROBLEM — R53.83 LETHARGY: Status: RESOLVED | Noted: 2021-09-23 | Resolved: 2021-09-24

## 2021-09-30 PROBLEM — E66.2 OBESITY HYPOVENTILATION SYNDROME: Status: ACTIVE | Noted: 2021-09-30

## 2021-10-01 PROBLEM — I50.33 ACUTE ON CHRONIC DIASTOLIC CONGESTIVE HEART FAILURE: Status: ACTIVE | Noted: 2021-10-01
